# Patient Record
Sex: FEMALE | Race: WHITE | Employment: FULL TIME | URBAN - METROPOLITAN AREA
[De-identification: names, ages, dates, MRNs, and addresses within clinical notes are randomized per-mention and may not be internally consistent; named-entity substitution may affect disease eponyms.]

---

## 2017-01-10 DIAGNOSIS — G43.009 MIGRAINE WITHOUT AURA AND WITHOUT STATUS MIGRAINOSUS, NOT INTRACTABLE: ICD-10-CM

## 2017-01-10 RX ORDER — TOPIRAMATE 25 MG/1
TABLET ORAL
Qty: 60 TABLET | Refills: 2 | Status: SHIPPED | OUTPATIENT
Start: 2017-01-10 | End: 2017-03-06 | Stop reason: SDUPTHER

## 2017-01-22 ENCOUNTER — PATIENT MESSAGE (OUTPATIENT)
Dept: INTERNAL MEDICINE CLINIC | Age: 27
End: 2017-01-22

## 2017-01-22 DIAGNOSIS — F41.0 PANIC DISORDER: Primary | ICD-10-CM

## 2017-02-17 RX ORDER — ALPRAZOLAM 1 MG/1
TABLET ORAL
Qty: 20 TABLET | Refills: 0 | Status: SHIPPED | OUTPATIENT
Start: 2017-02-17 | End: 2017-03-14 | Stop reason: SDUPTHER

## 2017-02-25 RX ORDER — CETIRIZINE HYDROCHLORIDE 10 MG/1
10 TABLET ORAL DAILY
Qty: 90 TABLET | Refills: 3 | Status: SHIPPED | OUTPATIENT
Start: 2017-02-25

## 2017-02-28 RX ORDER — LEVONORGESTREL AND ETHINYL ESTRADIOL 0.15-0.03
KIT ORAL
Qty: 91 TABLET | Refills: 0 | Status: CANCELLED | OUTPATIENT
Start: 2017-02-28

## 2017-03-06 DIAGNOSIS — G43.009 MIGRAINE WITHOUT AURA AND WITHOUT STATUS MIGRAINOSUS, NOT INTRACTABLE: ICD-10-CM

## 2017-03-06 RX ORDER — TOPIRAMATE 25 MG/1
TABLET ORAL
Qty: 75 TABLET | Refills: 0
Start: 2017-03-06 | End: 2017-03-28 | Stop reason: SDUPTHER

## 2017-03-14 ENCOUNTER — OFFICE VISIT (OUTPATIENT)
Dept: INTERNAL MEDICINE CLINIC | Age: 27
End: 2017-03-14

## 2017-03-14 VITALS
BODY MASS INDEX: 30.95 KG/M2 | HEIGHT: 67 IN | WEIGHT: 197.2 LBS | SYSTOLIC BLOOD PRESSURE: 112 MMHG | HEART RATE: 72 BPM | DIASTOLIC BLOOD PRESSURE: 74 MMHG

## 2017-03-14 DIAGNOSIS — F41.0 PANIC DISORDER: ICD-10-CM

## 2017-03-14 DIAGNOSIS — Z30.41 ORAL CONTRACEPTIVE PILL SURVEILLANCE: ICD-10-CM

## 2017-03-14 DIAGNOSIS — G43.009 MIGRAINE WITHOUT AURA AND WITHOUT STATUS MIGRAINOSUS, NOT INTRACTABLE: ICD-10-CM

## 2017-03-14 DIAGNOSIS — F41.1 GAD (GENERALIZED ANXIETY DISORDER): Primary | ICD-10-CM

## 2017-03-14 DIAGNOSIS — M25.561 ACUTE PAIN OF BOTH KNEES: ICD-10-CM

## 2017-03-14 DIAGNOSIS — M25.562 ACUTE PAIN OF BOTH KNEES: ICD-10-CM

## 2017-03-14 PROCEDURE — 99214 OFFICE O/P EST MOD 30 MIN: CPT | Performed by: FAMILY MEDICINE

## 2017-03-14 RX ORDER — ALPRAZOLAM 1 MG/1
TABLET ORAL
Qty: 20 TABLET | Refills: 1 | Status: SHIPPED | OUTPATIENT
Start: 2017-03-14 | End: 2017-05-22 | Stop reason: SDUPTHER

## 2017-03-14 RX ORDER — LEVONORGESTREL AND ETHINYL ESTRADIOL 0.15-0.03
KIT ORAL
Qty: 2 PACKET | Refills: 10 | Status: SHIPPED | OUTPATIENT
Start: 2017-03-14 | End: 2017-03-20 | Stop reason: SDUPTHER

## 2017-03-20 DIAGNOSIS — Z30.41 ORAL CONTRACEPTIVE PILL SURVEILLANCE: ICD-10-CM

## 2017-03-20 DIAGNOSIS — G43.009 MIGRAINE WITHOUT AURA AND WITHOUT STATUS MIGRAINOSUS, NOT INTRACTABLE: ICD-10-CM

## 2017-03-20 RX ORDER — LEVONORGESTREL AND ETHINYL ESTRADIOL 0.15-0.03
KIT ORAL
Qty: 1 PACKET | Refills: 12 | Status: SHIPPED | OUTPATIENT
Start: 2017-03-20 | End: 2017-06-12 | Stop reason: ALTCHOICE

## 2017-03-20 RX ORDER — LEVONORGESTREL AND ETHINYL ESTRADIOL 0.15-0.03
KIT ORAL
Qty: 1 PACKET | Refills: 12 | Status: SHIPPED | OUTPATIENT
Start: 2017-03-20 | End: 2017-03-20 | Stop reason: SDUPTHER

## 2017-03-22 ENCOUNTER — TELEPHONE (OUTPATIENT)
Dept: INTERNAL MEDICINE CLINIC | Age: 27
End: 2017-03-22

## 2017-03-22 ENCOUNTER — PATIENT MESSAGE (OUTPATIENT)
Dept: INTERNAL MEDICINE CLINIC | Age: 27
End: 2017-03-22

## 2017-03-27 ENCOUNTER — TELEPHONE (OUTPATIENT)
Dept: INTERNAL MEDICINE CLINIC | Age: 27
End: 2017-03-27

## 2017-03-28 DIAGNOSIS — G43.009 MIGRAINE WITHOUT AURA AND WITHOUT STATUS MIGRAINOSUS, NOT INTRACTABLE: ICD-10-CM

## 2017-03-28 RX ORDER — ESCITALOPRAM OXALATE 10 MG/1
10 TABLET ORAL DAILY
Qty: 30 TABLET | Refills: 1 | Status: SHIPPED | OUTPATIENT
Start: 2017-03-28 | End: 2017-04-24 | Stop reason: SDUPTHER

## 2017-03-28 RX ORDER — TOPIRAMATE 25 MG/1
TABLET ORAL
Qty: 225 TABLET | Refills: 0 | Status: SHIPPED | OUTPATIENT
Start: 2017-03-28 | End: 2017-05-08 | Stop reason: SDUPTHER

## 2017-03-28 RX ORDER — TOPIRAMATE 25 MG/1
TABLET ORAL
Qty: 75 TABLET | Refills: 0 | Status: SHIPPED | OUTPATIENT
Start: 2017-03-28 | End: 2017-03-28 | Stop reason: SDUPTHER

## 2017-04-05 ENCOUNTER — TELEPHONE (OUTPATIENT)
Dept: PHARMACY | Facility: CLINIC | Age: 27
End: 2017-04-05

## 2017-04-12 ENCOUNTER — PATIENT MESSAGE (OUTPATIENT)
Dept: INTERNAL MEDICINE CLINIC | Age: 27
End: 2017-04-12

## 2017-04-12 DIAGNOSIS — G43.719 INTRACTABLE CHRONIC MIGRAINE WITHOUT AURA AND WITHOUT STATUS MIGRAINOSUS: ICD-10-CM

## 2017-04-12 DIAGNOSIS — G43.009 MIGRAINE WITHOUT AURA AND WITHOUT STATUS MIGRAINOSUS, NOT INTRACTABLE: Primary | ICD-10-CM

## 2017-04-12 RX ORDER — LEVONORGESTREL AND ETHINYL ESTRADIOL 0.15-0.03
KIT ORAL
Qty: 91 TABLET | Refills: 1 | Status: SHIPPED | OUTPATIENT
Start: 2017-04-12 | End: 2017-05-22 | Stop reason: SDUPTHER

## 2017-04-12 RX ORDER — LEVONORGESTREL AND ETHINYL ESTRADIOL 0.15-0.03
KIT ORAL
Qty: 91 TABLET | Refills: 1 | Status: SHIPPED | OUTPATIENT
Start: 2017-04-12 | End: 2017-04-12 | Stop reason: SDUPTHER

## 2017-04-24 RX ORDER — ESCITALOPRAM OXALATE 10 MG/1
10 TABLET ORAL DAILY
Qty: 90 TABLET | Refills: 1 | Status: SHIPPED | OUTPATIENT
Start: 2017-04-24 | End: 2018-01-05 | Stop reason: SDUPTHER

## 2017-04-30 DIAGNOSIS — G43.009 MIGRAINE WITHOUT AURA AND WITHOUT STATUS MIGRAINOSUS, NOT INTRACTABLE: ICD-10-CM

## 2017-05-01 RX ORDER — TOPIRAMATE 25 MG/1
TABLET ORAL
Qty: 75 TABLET | Refills: 0 | Status: SHIPPED | OUTPATIENT
Start: 2017-05-01 | End: 2017-05-08 | Stop reason: SDUPTHER

## 2017-05-04 ENCOUNTER — PATIENT MESSAGE (OUTPATIENT)
Dept: INTERNAL MEDICINE CLINIC | Age: 27
End: 2017-05-04

## 2017-05-04 DIAGNOSIS — G43.009 MIGRAINE WITHOUT AURA AND WITHOUT STATUS MIGRAINOSUS, NOT INTRACTABLE: ICD-10-CM

## 2017-05-08 RX ORDER — TOPIRAMATE 25 MG/1
TABLET ORAL
Qty: 90 TABLET | Refills: 0
Start: 2017-05-08 | End: 2017-06-12 | Stop reason: SDUPTHER

## 2017-05-22 DIAGNOSIS — F41.0 PANIC DISORDER: ICD-10-CM

## 2017-05-22 RX ORDER — LEVONORGESTREL AND ETHINYL ESTRADIOL 0.15-0.03
KIT ORAL
Qty: 91 TABLET | Refills: 1 | Status: SHIPPED | OUTPATIENT
Start: 2017-05-22 | End: 2017-12-19 | Stop reason: SDUPTHER

## 2017-05-22 RX ORDER — PROMETHAZINE HYDROCHLORIDE 25 MG/1
25 TABLET ORAL EVERY 6 HOURS PRN
Qty: 15 TABLET | Refills: 3 | Status: SHIPPED | OUTPATIENT
Start: 2017-05-22 | End: 2017-11-07 | Stop reason: SDUPTHER

## 2017-05-22 RX ORDER — ALPRAZOLAM 1 MG/1
TABLET ORAL
Qty: 20 TABLET | Refills: 0 | Status: SHIPPED | OUTPATIENT
Start: 2017-05-22 | End: 2017-06-12 | Stop reason: SDUPTHER

## 2017-06-12 ENCOUNTER — OFFICE VISIT (OUTPATIENT)
Dept: INTERNAL MEDICINE CLINIC | Age: 27
End: 2017-06-12

## 2017-06-12 VITALS
WEIGHT: 189 LBS | DIASTOLIC BLOOD PRESSURE: 68 MMHG | HEIGHT: 67 IN | HEART RATE: 64 BPM | SYSTOLIC BLOOD PRESSURE: 100 MMHG | BODY MASS INDEX: 29.66 KG/M2

## 2017-06-12 DIAGNOSIS — F41.1 GAD (GENERALIZED ANXIETY DISORDER): ICD-10-CM

## 2017-06-12 DIAGNOSIS — G43.009 MIGRAINE WITHOUT AURA AND WITHOUT STATUS MIGRAINOSUS, NOT INTRACTABLE: Primary | ICD-10-CM

## 2017-06-12 DIAGNOSIS — Z79.899 HIGH RISK MEDICATION USE: ICD-10-CM

## 2017-06-12 DIAGNOSIS — F41.0 PANIC DISORDER: ICD-10-CM

## 2017-06-12 DIAGNOSIS — Z11.4 SCREENING FOR HIV WITHOUT PRESENCE OF RISK FACTORS: ICD-10-CM

## 2017-06-12 PROCEDURE — 96372 THER/PROPH/DIAG INJ SC/IM: CPT | Performed by: FAMILY MEDICINE

## 2017-06-12 PROCEDURE — 99214 OFFICE O/P EST MOD 30 MIN: CPT | Performed by: FAMILY MEDICINE

## 2017-06-12 RX ORDER — ESTRADIOL 1 MG/1
TABLET ORAL
Qty: 30 TABLET | Refills: 3 | Status: SHIPPED | OUTPATIENT
Start: 2017-06-12 | End: 2017-09-08 | Stop reason: SDUPTHER

## 2017-06-12 RX ORDER — ALPRAZOLAM 1 MG/1
TABLET ORAL
Qty: 20 TABLET | Refills: 0 | Status: SHIPPED | OUTPATIENT
Start: 2017-06-12 | End: 2017-07-27 | Stop reason: SDUPTHER

## 2017-06-12 RX ORDER — TOPIRAMATE 50 MG/1
TABLET, FILM COATED ORAL
Qty: 180 TABLET | Refills: 3 | Status: SHIPPED | OUTPATIENT
Start: 2017-06-12 | End: 2018-07-05

## 2017-06-12 ASSESSMENT — ENCOUNTER SYMPTOMS
COUGH: 0
SHORTNESS OF BREATH: 0
CHEST TIGHTNESS: 0
WHEEZING: 0

## 2017-06-12 ASSESSMENT — PATIENT HEALTH QUESTIONNAIRE - PHQ9
SUM OF ALL RESPONSES TO PHQ QUESTIONS 1-9: 0
1. LITTLE INTEREST OR PLEASURE IN DOING THINGS: 0
SUM OF ALL RESPONSES TO PHQ9 QUESTIONS 1 & 2: 0
2. FEELING DOWN, DEPRESSED OR HOPELESS: 0

## 2017-06-26 PROBLEM — G43.839 INTRACTABLE MENSTRUAL MIGRAINE WITHOUT STATUS MIGRAINOSUS: Status: ACTIVE | Noted: 2017-06-26

## 2017-06-26 PROBLEM — G43.719 INTRACTABLE CHRONIC MIGRAINE WITHOUT AURA AND WITHOUT STATUS MIGRAINOSUS: Status: ACTIVE | Noted: 2017-06-26

## 2017-06-26 PROBLEM — M54.81 CERVICO-OCCIPITAL NEURALGIA: Status: ACTIVE | Noted: 2017-06-26

## 2017-07-17 ENCOUNTER — TELEPHONE (OUTPATIENT)
Dept: SLEEP MEDICINE | Age: 27
End: 2017-07-17

## 2017-07-27 ENCOUNTER — TELEPHONE (OUTPATIENT)
Dept: INTERNAL MEDICINE CLINIC | Age: 27
End: 2017-07-27

## 2017-07-27 DIAGNOSIS — F41.0 PANIC DISORDER: ICD-10-CM

## 2017-07-27 RX ORDER — ALPRAZOLAM 1 MG/1
TABLET ORAL
Qty: 20 TABLET | Refills: 1 | Status: SHIPPED | OUTPATIENT
Start: 2017-07-27 | End: 2017-09-08 | Stop reason: SDUPTHER

## 2017-09-08 ENCOUNTER — OFFICE VISIT (OUTPATIENT)
Dept: INTERNAL MEDICINE CLINIC | Age: 27
End: 2017-09-08

## 2017-09-08 VITALS
WEIGHT: 204 LBS | HEIGHT: 67 IN | HEART RATE: 72 BPM | SYSTOLIC BLOOD PRESSURE: 102 MMHG | BODY MASS INDEX: 32.02 KG/M2 | DIASTOLIC BLOOD PRESSURE: 84 MMHG

## 2017-09-08 DIAGNOSIS — Z13.31 POSITIVE DEPRESSION SCREENING: ICD-10-CM

## 2017-09-08 DIAGNOSIS — F32.9 REACTIVE DEPRESSION: Primary | ICD-10-CM

## 2017-09-08 DIAGNOSIS — Z79.899 ENCOUNTER FOR LONG TERM BENZODIAZEPINE THERAPY: ICD-10-CM

## 2017-09-08 DIAGNOSIS — F41.0 PANIC DISORDER: ICD-10-CM

## 2017-09-08 DIAGNOSIS — G43.009 MIGRAINE WITHOUT AURA AND WITHOUT STATUS MIGRAINOSUS, NOT INTRACTABLE: ICD-10-CM

## 2017-09-08 PROCEDURE — 99213 OFFICE O/P EST LOW 20 MIN: CPT | Performed by: FAMILY MEDICINE

## 2017-09-08 PROCEDURE — G8431 POS CLIN DEPRES SCRN F/U DOC: HCPCS | Performed by: FAMILY MEDICINE

## 2017-09-08 PROCEDURE — G0444 DEPRESSION SCREEN ANNUAL: HCPCS | Performed by: FAMILY MEDICINE

## 2017-09-08 RX ORDER — ESTRADIOL 1 MG/1
TABLET ORAL
Qty: 30 TABLET | Refills: 5 | Status: SHIPPED | OUTPATIENT
Start: 2017-09-08 | End: 2017-11-08 | Stop reason: SINTOL

## 2017-09-08 RX ORDER — BUPROPION HYDROCHLORIDE 150 MG/1
150 TABLET ORAL EVERY MORNING
Qty: 30 TABLET | Refills: 1 | Status: SHIPPED | OUTPATIENT
Start: 2017-09-08 | End: 2017-10-19 | Stop reason: SDUPTHER

## 2017-09-08 RX ORDER — ALPRAZOLAM 1 MG/1
TABLET ORAL
Qty: 20 TABLET | Refills: 2 | Status: SHIPPED | OUTPATIENT
Start: 2017-09-08 | End: 2018-01-15 | Stop reason: SDUPTHER

## 2017-09-08 ASSESSMENT — PATIENT HEALTH QUESTIONNAIRE - PHQ9
6. FEELING BAD ABOUT YOURSELF - OR THAT YOU ARE A FAILURE OR HAVE LET YOURSELF OR YOUR FAMILY DOWN: 1
9. THOUGHTS THAT YOU WOULD BE BETTER OFF DEAD, OR OF HURTING YOURSELF: 1
2. FEELING DOWN, DEPRESSED OR HOPELESS: 1
SUM OF ALL RESPONSES TO PHQ9 QUESTIONS 1 & 2: 3
1. LITTLE INTEREST OR PLEASURE IN DOING THINGS: 2
3. TROUBLE FALLING OR STAYING ASLEEP: 0
8. MOVING OR SPEAKING SO SLOWLY THAT OTHER PEOPLE COULD HAVE NOTICED. OR THE OPPOSITE, BEING SO FIGETY OR RESTLESS THAT YOU HAVE BEEN MOVING AROUND A LOT MORE THAN USUAL: 0
SUM OF ALL RESPONSES TO PHQ QUESTIONS 1-9: 12
10. IF YOU CHECKED OFF ANY PROBLEMS, HOW DIFFICULT HAVE THESE PROBLEMS MADE IT FOR YOU TO DO YOUR WORK, TAKE CARE OF THINGS AT HOME, OR GET ALONG WITH OTHER PEOPLE: 1
7. TROUBLE CONCENTRATING ON THINGS, SUCH AS READING THE NEWSPAPER OR WATCHING TELEVISION: 1
5. POOR APPETITE OR OVEREATING: 3
4. FEELING TIRED OR HAVING LITTLE ENERGY: 3

## 2017-10-06 ENCOUNTER — TELEPHONE (OUTPATIENT)
Dept: INTERNAL MEDICINE CLINIC | Age: 27
End: 2017-10-06

## 2017-10-06 NOTE — TELEPHONE ENCOUNTER
She is only to be on the estrace for 1 week out of 3 months. Hopefully that is what she is doing,     BUT yes, it can cause the nipple tenderness. She may wish to take 1/2 pill instead of one per day.

## 2017-10-19 ENCOUNTER — TELEPHONE (OUTPATIENT)
Dept: INTERNAL MEDICINE CLINIC | Age: 27
End: 2017-10-19

## 2017-10-19 ENCOUNTER — PATIENT MESSAGE (OUTPATIENT)
Dept: INTERNAL MEDICINE CLINIC | Age: 27
End: 2017-10-19

## 2017-10-19 DIAGNOSIS — F32.9 REACTIVE DEPRESSION: ICD-10-CM

## 2017-10-19 RX ORDER — BUPROPION HYDROCHLORIDE 300 MG/1
300 TABLET ORAL EVERY MORNING
Qty: 90 TABLET | Refills: 1 | Status: SHIPPED | OUTPATIENT
Start: 2017-10-19 | End: 2018-07-05 | Stop reason: SINTOL

## 2017-10-19 NOTE — LETTER
[facility]  [address] [place] [address]  Phone: [phone]  Fax: [phone]    Diya Ortiz MD        October 23, 2017     Patient: Yun Mendes   YOB: 1990   Date of Visit: 10/19/2017       To Whom It May Concern:    Yun Mendes takes the following medications that have a potential for sedation:    Xanax 1 mg for rare panic attack. Instructed not to drive at all within 6 hours of use. Hydroxyzine 25 mg at night AS needed. The sedation should be worn off by morning. Parafon Forte (Chlorzoxazone) 500 mg four times daily as needed for headache. None of these above medications are used on a daily basis. She also takes Topamax 50 mg twice a day. Less than 15 out of 100 people report any drowsiness with this medication and Yasmin Tang does not appear to have any sedation with it.         Sincerely,        Diya Ortiz MD

## 2017-11-07 DIAGNOSIS — G43.019 INTRACTABLE MIGRAINE WITHOUT AURA AND WITHOUT STATUS MIGRAINOSUS: ICD-10-CM

## 2017-11-07 RX ORDER — PROMETHAZINE HYDROCHLORIDE 25 MG/1
25 TABLET ORAL EVERY 6 HOURS PRN
Qty: 15 TABLET | Refills: 3 | Status: SHIPPED | OUTPATIENT
Start: 2017-11-07 | End: 2018-12-06 | Stop reason: SDUPTHER

## 2017-11-07 RX ORDER — NAPROXEN SODIUM 550 MG/1
TABLET ORAL
Qty: 90 TABLET | Refills: 2 | Status: SHIPPED | OUTPATIENT
Start: 2017-11-07 | End: 2018-02-05 | Stop reason: ALTCHOICE

## 2017-11-07 NOTE — TELEPHONE ENCOUNTER
From: Loistine Closs  To: Alyson Orourke MD  Sent: 11/6/2017 7:01 PM EST  Subject: Medication Renewal Request    Loistine Closs would like a refill of the following medications:  naproxen sodium (ANAPROX) 550 MG tablet Alyson Orourke MD]  promethazine (PHENERGAN) 25 MG tablet Alyson Orourke MD]    [address] pharmacy: [address] [facility] [phone] - F [phone]    Comment:      Medication renewals requested in this message routed separately:  SUMAtriptan (IMITREX) 6 MG/0.5ML SOLN injection [Sonia SMALLS Contractor, MD]

## 2017-11-08 ENCOUNTER — TELEPHONE (OUTPATIENT)
Dept: INTERNAL MEDICINE CLINIC | Age: 27
End: 2017-11-08

## 2017-11-08 DIAGNOSIS — N92.6 IRREGULAR MENSTRUAL BLEEDING: Primary | ICD-10-CM

## 2017-11-08 NOTE — TELEPHONE ENCOUNTER
Will order pelvic US    Get off the estradiol completely. Stop the birth control for 1 week and then restart only the birth control. Avoid sex while off the pill and the first 2 weeks of resuming. Schedule her a visit in the near future.

## 2017-11-13 ENCOUNTER — TELEPHONE (OUTPATIENT)
Dept: INTERNAL MEDICINE CLINIC | Age: 27
End: 2017-11-13

## 2017-12-01 ENCOUNTER — TELEPHONE (OUTPATIENT)
Dept: INTERNAL MEDICINE CLINIC | Age: 27
End: 2017-12-01

## 2017-12-19 RX ORDER — SUMATRIPTAN 100 MG/1
TABLET, FILM COATED ORAL
Qty: 27 TABLET | Refills: 3 | Status: SHIPPED | OUTPATIENT
Start: 2017-12-19 | End: 2018-07-05 | Stop reason: SDUPTHER

## 2017-12-19 RX ORDER — LEVONORGESTREL AND ETHINYL ESTRADIOL 0.15-0.03
KIT ORAL
Qty: 91 TABLET | Refills: 0 | Status: SHIPPED | OUTPATIENT
Start: 2017-12-19 | End: 2018-07-05 | Stop reason: SDUPTHER

## 2018-01-03 ENCOUNTER — OFFICE VISIT (OUTPATIENT)
Dept: ORTHOPEDIC SURGERY | Age: 28
End: 2018-01-03

## 2018-01-03 VITALS
DIASTOLIC BLOOD PRESSURE: 94 MMHG | WEIGHT: 220 LBS | BODY MASS INDEX: 33.34 KG/M2 | HEART RATE: 72 BPM | HEIGHT: 68 IN | SYSTOLIC BLOOD PRESSURE: 134 MMHG

## 2018-01-03 DIAGNOSIS — M25.521 ELBOW PAIN, RIGHT: Primary | ICD-10-CM

## 2018-01-03 DIAGNOSIS — S50.01XA CONTUSION OF RIGHT ELBOW, INITIAL ENCOUNTER: ICD-10-CM

## 2018-01-03 PROCEDURE — 73080 X-RAY EXAM OF ELBOW: CPT | Performed by: NURSE PRACTITIONER

## 2018-01-03 PROCEDURE — 99203 OFFICE O/P NEW LOW 30 MIN: CPT | Performed by: NURSE PRACTITIONER

## 2018-01-03 PROCEDURE — L3660 SO 8 AB RSTR CAN/WEB PRE OTS: HCPCS | Performed by: NURSE PRACTITIONER

## 2018-01-04 RX ORDER — MELOXICAM 15 MG/1
15 TABLET ORAL DAILY
Qty: 30 TABLET | Refills: 0 | Status: SHIPPED | OUTPATIENT
Start: 2018-01-04 | End: 2018-01-15

## 2018-01-04 NOTE — PROGRESS NOTES
Subjective    Patient ID: Neeta Thomas is a 24 y.o..  female. Patient's medications, allergies, past medical, surgical, social and family histories were reviewed and updated as appropriate. Pain Assessment  Location of Pain: Elbow  Location Modifiers: Right  Severity of Pain: 8  Quality of Pain: Aching  Duration of Pain: Persistent  Frequency of Pain: Constant  Date Pain First Started: 12/31/17  Aggravating Factors: Bending  Limiting Behavior: Some  Relieving Factors: Ice  Result of Injury: Yes  Work-Related Injury: No  Are there other pain locations you wish to document?: No    Elbow Pain:  Pt sustained a right elbow injury 3 day(s) ago. On 12/31/17 the patient hit her elbow hard on a counter at the ulnar aspect of her elbow. She points to the ulnar aspect of her elbow is the source of her pain and is having numbness and tingling into her forearm and 3 fingers. She is also having some shoulder pain but does not remember specific mechanism of injury related to the shoulder. She does have a history of neck pain. She has been taking naproxen with no relief. She has tried ice which provided some relief and heat which made it worse. 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 Physical Exam:  Constitutional:  Pt well groomed, no acute distress, well developed, no obvious deformities  Vitals:    01/03/18 1848   BP: (!) 134/94   Pulse: 72   Weight: 220 lb (99.8 kg)   Height: 5' 8\" (1.727 m)     -Oriented to person, place, and time  -Mood and affect is appropriate    Elbow exam:  normal exam of elbow, no swelling, tenderness, instability; full range of motion. Pain with elbow flexion and reaching.       Contralateral Exam:  -No obvious deformities  -No abrasions or cellulitis noted, NVI   -Full ROM   -No joint laxity  -no palpable tenderness noted    Skin:  No abrasions, lesions, cellulitic changes, obvious deformities noted     Xray: 3 view of  right elbow show:  No

## 2018-01-05 RX ORDER — ESCITALOPRAM OXALATE 10 MG/1
10 TABLET ORAL DAILY
Qty: 90 TABLET | Refills: 1 | Status: SHIPPED | OUTPATIENT
Start: 2018-01-05 | End: 2018-02-03 | Stop reason: SDUPTHER

## 2018-01-15 ENCOUNTER — OFFICE VISIT (OUTPATIENT)
Dept: INTERNAL MEDICINE CLINIC | Age: 28
End: 2018-01-15

## 2018-01-15 VITALS
HEIGHT: 68 IN | HEART RATE: 64 BPM | DIASTOLIC BLOOD PRESSURE: 82 MMHG | SYSTOLIC BLOOD PRESSURE: 130 MMHG | BODY MASS INDEX: 33.65 KG/M2 | WEIGHT: 222 LBS

## 2018-01-15 DIAGNOSIS — Z79.899 ENCOUNTER FOR LONG TERM BENZODIAZEPINE THERAPY: ICD-10-CM

## 2018-01-15 DIAGNOSIS — N92.6 IRREGULAR MENSTRUAL BLEEDING: ICD-10-CM

## 2018-01-15 DIAGNOSIS — F41.0 PANIC DISORDER: ICD-10-CM

## 2018-01-15 DIAGNOSIS — F41.1 GAD (GENERALIZED ANXIETY DISORDER): Primary | ICD-10-CM

## 2018-01-15 PROCEDURE — 99214 OFFICE O/P EST MOD 30 MIN: CPT | Performed by: FAMILY MEDICINE

## 2018-01-15 RX ORDER — ALPRAZOLAM 1 MG/1
TABLET ORAL
Qty: 20 TABLET | Refills: 2 | Status: SHIPPED | OUTPATIENT
Start: 2018-01-15 | End: 2018-04-15

## 2018-01-15 NOTE — PATIENT INSTRUCTIONS
Would strongly want you to consider DepoProvera or a low dose IUD for the birth control. Pills can be affected by your Topamax and reduce the hormone levels.

## 2018-01-15 NOTE — PROGRESS NOTES
Objective:   Physical Exam   Constitutional: She is oriented to person, place, and time. She appears well-developed and well-nourished. No distress. Eyes: Conjunctivae are normal. No scleral icterus. Neck: Normal range of motion. Neck supple. Carotid bruit is not present. No thyroid mass and no thyromegaly present. Cardiovascular: Normal rate, regular rhythm, S1 normal, S2 normal, normal heart sounds and intact distal pulses. No murmur heard. Pulmonary/Chest: Effort normal and breath sounds normal. No respiratory distress. She has no decreased breath sounds. She has no wheezes. She has no rhonchi. She has no rales. Abdominal: Soft. Normal appearance and bowel sounds are normal. She exhibits no abdominal bruit and no mass. There is no hepatomegaly. There is no tenderness. Lymphadenopathy:     She has no cervical adenopathy. Neurological: She is alert and oriented to person, place, and time. She displays no tremor. She exhibits normal muscle tone. Coordination and gait normal.   Skin: Skin is warm and dry. She is not diaphoretic. No cyanosis. No pallor. Nails show no clubbing. Psychiatric: She has a normal mood and affect. Her speech is normal and behavior is normal. Cognition and memory are normal.   Vitals reviewed. Wt Readings from Last 3 Encounters:   01/15/18 222 lb (100.7 kg)   01/03/18 220 lb (99.8 kg)   12/04/17 216 lb (98 kg)     Body mass index is 33.75 kg/m². Assessment:      1. Irregular menstrual bleeding  Stay off additional estrogen. Consider DepoProvera --- she heard bad things so does not want to do this. Consider low dose IUD. dwp that current bleeding is probably normal for extended spectrum ocp and her use of topamax with her obesity. Notify office if bleeding does not stop in 1 week, bernie heavy flow. - US Non OB Transvaginal  --- check cost at [facility].   - CBC Auto Differential  - Iron and TIBC  - TSH with Reflex    2.  VERA (generalized anxiety

## 2018-01-16 ASSESSMENT — ENCOUNTER SYMPTOMS
CHEST TIGHTNESS: 0
WHEEZING: 0
SHORTNESS OF BREATH: 0
COUGH: 0

## 2018-01-21 DIAGNOSIS — Z11.4 SCREENING FOR HIV WITHOUT PRESENCE OF RISK FACTORS: Primary | ICD-10-CM

## 2018-02-02 ENCOUNTER — TELEPHONE (OUTPATIENT)
Dept: NEUROLOGY | Age: 28
End: 2018-02-02

## 2018-02-02 DIAGNOSIS — M54.81 CERVICO-OCCIPITAL NEURALGIA: ICD-10-CM

## 2018-02-02 DIAGNOSIS — G43.839 INTRACTABLE MENSTRUAL MIGRAINE WITHOUT STATUS MIGRAINOSUS: ICD-10-CM

## 2018-02-02 DIAGNOSIS — G43.719 INTRACTABLE CHRONIC MIGRAINE WITHOUT AURA AND WITHOUT STATUS MIGRAINOSUS: ICD-10-CM

## 2018-02-02 NOTE — TELEPHONE ENCOUNTER
Office can check sample supply of zembrace & cambia. What are pt options since she can only have 2 zembrace. LMOV to see if insurance is not covering cambia?

## 2018-02-05 ENCOUNTER — TELEPHONE (OUTPATIENT)
Dept: INTERNAL MEDICINE CLINIC | Age: 28
End: 2018-02-05

## 2018-02-05 DIAGNOSIS — M54.81 CERVICO-OCCIPITAL NEURALGIA: ICD-10-CM

## 2018-02-05 DIAGNOSIS — G43.719 INTRACTABLE CHRONIC MIGRAINE WITHOUT AURA AND WITHOUT STATUS MIGRAINOSUS: ICD-10-CM

## 2018-02-05 DIAGNOSIS — G43.839 INTRACTABLE MENSTRUAL MIGRAINE WITHOUT STATUS MIGRAINOSUS: ICD-10-CM

## 2018-02-05 RX ORDER — ESCITALOPRAM OXALATE 10 MG/1
TABLET ORAL
Qty: 90 TABLET | Refills: 0 | Status: SHIPPED | OUTPATIENT
Start: 2018-02-05 | End: 2018-07-05 | Stop reason: SDUPTHER

## 2018-02-05 NOTE — TELEPHONE ENCOUNTER
A 90 day supply of Imitrex with 3 refills was sent to [facility] on 12/19/17. She should have refills left. Unfortunately, we cannot get them to refill early if you are not due yet. You can contact them. Cannot send in the Xanax for 90 days. Use the Goodrx coupons. I am not really familiar with this Cambia pack so how many for 90 days since it is used as needed. She sent in #8 so I assume this is to last 30 days but I really do not know since I did not prescribe it. Also if it is prior authorization, Dr. Dariel Jameson will need to do this.

## 2018-03-19 ENCOUNTER — PATIENT MESSAGE (OUTPATIENT)
Dept: INTERNAL MEDICINE CLINIC | Age: 28
End: 2018-03-19

## 2018-03-22 ENCOUNTER — PATIENT MESSAGE (OUTPATIENT)
Dept: INTERNAL MEDICINE CLINIC | Age: 28
End: 2018-03-22

## 2018-03-22 RX ORDER — BUSPIRONE HYDROCHLORIDE 5 MG/1
5 TABLET ORAL NIGHTLY
Qty: 30 TABLET | Refills: 1 | Status: SHIPPED | OUTPATIENT
Start: 2018-03-22 | End: 2018-07-05

## 2018-03-22 NOTE — TELEPHONE ENCOUNTER
From: Daniella Gonzalez  To: Alfredo Miramontes MD  Sent: 3/22/2018 10:40 AM EDT  Subject: Prescription Question    I will definitely start saving it for bad panic attacks only. I know sometimes I take it when I feel really anxious also, so Ill stop doing that! I shouldbe fine until may, but will let you know if I do end up needing a refill. With the buspar It made me WAY too tired. I already have trouble in the mornings as it is and I remember it was awful. If you need to lower the amount I get per 30 days that makes sense, but I would still like to keep the Xanax for panic attacks as needed so I dont have to take something every day. Also would like to talk about my weight soon. Thanks!!!  ----- Message -----  From: Alfredo Miramontes MD  Sent: 3/21/2018 4:21 PM EDT  To: Daniella Gonzalez  Subject: RE: Prescription Question  Please try to reserve the Xanax for a full blown panic attack. It should not be taken too often. I could get you a small supply if absolutely needed IF you run out. I think 20 pills per 30 days is starting to get a bit too frequent. We need to consider other anxiety treatments. Do you remember what side effects you had with Buspirone (Buspar)? Maybe we can use this again at a lower dose until you get used to it.      ----- Message -----   From: Daniella Gonzalez   Sent: 3/19/2018 5:12 PM EDT   To: Alfredo Miramontes MD  Subject: Prescription Question    Hi Dr. Shannon Young! I just got a job at the [address] [place] [address] been waiting on!! But... I wont have insurance until may 21stbi think it is. I have to do 90 days before Im enrolled. The problem is my alprazolam prescription runs out before then. Not sure what I should do if you have a recommendation Im not sure if you can fill it without my check-up. Ill also have to email dr. Andrew Small about my migrain medications! Please let me know, thank you!

## 2018-05-02 RX ORDER — ALPRAZOLAM 1 MG/1
TABLET ORAL
Qty: 20 TABLET | Refills: 0 | Status: SHIPPED | OUTPATIENT
Start: 2018-05-02 | End: 2018-07-05 | Stop reason: SDUPTHER

## 2018-05-15 ENCOUNTER — TELEPHONE (OUTPATIENT)
Dept: INTERNAL MEDICINE CLINIC | Age: 28
End: 2018-05-15

## 2018-07-05 ENCOUNTER — OFFICE VISIT (OUTPATIENT)
Dept: INTERNAL MEDICINE CLINIC | Age: 28
End: 2018-07-05

## 2018-07-05 VITALS
HEIGHT: 68 IN | BODY MASS INDEX: 33.52 KG/M2 | WEIGHT: 221.2 LBS | DIASTOLIC BLOOD PRESSURE: 78 MMHG | SYSTOLIC BLOOD PRESSURE: 122 MMHG | HEART RATE: 72 BPM

## 2018-07-05 DIAGNOSIS — F90.0 ATTENTION DEFICIT HYPERACTIVITY DISORDER (ADHD), PREDOMINANTLY INATTENTIVE TYPE: ICD-10-CM

## 2018-07-05 DIAGNOSIS — K21.9 GASTROESOPHAGEAL REFLUX DISEASE WITHOUT ESOPHAGITIS: ICD-10-CM

## 2018-07-05 DIAGNOSIS — F41.1 GAD (GENERALIZED ANXIETY DISORDER): Primary | ICD-10-CM

## 2018-07-05 DIAGNOSIS — J31.0 OTHER CHRONIC RHINITIS: ICD-10-CM

## 2018-07-05 DIAGNOSIS — Z79.899 ENCOUNTER FOR LONG TERM BENZODIAZEPINE THERAPY: ICD-10-CM

## 2018-07-05 DIAGNOSIS — F41.0 PANIC DISORDER: ICD-10-CM

## 2018-07-05 DIAGNOSIS — G43.009 MIGRAINE WITHOUT AURA AND WITHOUT STATUS MIGRAINOSUS, NOT INTRACTABLE: ICD-10-CM

## 2018-07-05 DIAGNOSIS — Z30.41 ORAL CONTRACEPTIVE PILL SURVEILLANCE: ICD-10-CM

## 2018-07-05 PROBLEM — G43.719 INTRACTABLE CHRONIC MIGRAINE WITHOUT AURA AND WITHOUT STATUS MIGRAINOSUS: Status: RESOLVED | Noted: 2017-06-26 | Resolved: 2018-07-05

## 2018-07-05 PROCEDURE — 99214 OFFICE O/P EST MOD 30 MIN: CPT | Performed by: FAMILY MEDICINE

## 2018-07-05 RX ORDER — FLUTICASONE PROPIONATE 50 MCG
SPRAY, SUSPENSION (ML) NASAL
Qty: 1 BOTTLE | Refills: 12 | Status: SHIPPED | OUTPATIENT
Start: 2018-07-05 | End: 2019-10-03 | Stop reason: SDUPTHER

## 2018-07-05 RX ORDER — LEVONORGESTREL AND ETHINYL ESTRADIOL 0.15-0.03
KIT ORAL
Qty: 91 TABLET | Refills: 3 | Status: SHIPPED | OUTPATIENT
Start: 2018-07-05 | End: 2019-08-12 | Stop reason: SDUPTHER

## 2018-07-05 RX ORDER — ALPRAZOLAM 1 MG/1
1 TABLET ORAL DAILY PRN
Qty: 20 TABLET | Refills: 1 | Status: SHIPPED | OUTPATIENT
Start: 2018-07-05 | End: 2018-09-03

## 2018-07-05 RX ORDER — SUMATRIPTAN 100 MG/1
TABLET, FILM COATED ORAL
Qty: 9 TABLET | Refills: 12 | Status: SHIPPED | OUTPATIENT
Start: 2018-07-05 | End: 2019-04-19 | Stop reason: SDUPTHER

## 2018-07-05 RX ORDER — SUMATRIPTAN AND NAPROXEN SODIUM 85; 500 MG/1; MG/1
TABLET, FILM COATED ORAL
Qty: 2 TABLET | Refills: 0 | Status: CANCELLED | OUTPATIENT
Start: 2018-07-05

## 2018-07-05 RX ORDER — NAPROXEN SODIUM 550 MG/1
TABLET ORAL
Qty: 60 TABLET | Refills: 2 | Status: SHIPPED | OUTPATIENT
Start: 2018-07-05

## 2018-07-05 RX ORDER — ESCITALOPRAM OXALATE 10 MG/1
TABLET ORAL
Qty: 90 TABLET | Refills: 1 | Status: SHIPPED | OUTPATIENT
Start: 2018-07-05 | End: 2018-08-07 | Stop reason: SDUPTHER

## 2018-07-05 NOTE — PROGRESS NOTES
Subjective:      Patient ID: Alexandrai Montemayor is a 24 y.o. female. 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 She loves her job. Hours are more regular so this is helping her migraines. The first 3 months of her new job, she was doing really good with her migraines. Some increase in migraines with the ho humidt weather, but after being on migraine support group site, she switched to natural deoderent and off the aluminum. She feels her headaches are doing better since she switched off of the alluminum antiperspirant. VERA:  Doing better. The new job is helping her plus she is meditating. She still gets a few bouts of panic. Overall better. ADD:  She notices once in a while that she has trouble focusing but told she is doing well at her work and likes it a lot. Does not feel she needs medication to do her job. Her current OCP is doing well also. Periods are regular.       Patient Active Problem List   Diagnosis    Migraine without aura and without status migrainosus, not intractable    ADHD (attention deficit hyperactivity disorder)    Allergic rhinitis    Kidney stone    Hypersomnia    VERA (generalized anxiety disorder)    Oral contraceptive pill surveillance    GERD (gastroesophageal reflux disease)    Intractable chronic migraine without aura and without status migrainosus    Intractable menstrual migraine without status migrainosus    Cervico-occipital neuralgia         Outpatient Prescriptions Marked as Taking for the 7/5/18 encounter (Office Visit) with Digna Magana MD   Medication Sig Dispense Refill    diclofenac (CATAFLAM) 50 MG tablet Take 1 tablet by mouth 3 times daily as needed (headache) Limit to 10 days a month 90 tablet 0    SUMAtriptan Succinate (ZEMBRACE SYMTOUCH) 3 MG/0.5ML SOAJ Inject 0.5 mLs into the skin as needed (headache) May repeat after 1 hr, not to exceed 2/24hrs. 3 pen 0    clindamycin-benzoyl peroxide (BENZACLIN) 1-5 % gel APPLY EXTERNALLY TO THE AFFECTED AREA TWICE DAILY 150 g 3    QUASENSE 0.15-0.03 MG per tablet TAKE 1 TABLET BY MOUTH DAILY 91 tablet 0    SUMAtriptan (IMITREX) 100 MG tablet TAKE ONE TABLET BY MOUTH ONCE AS NEEDED FOR MIGRAINE 27 tablet 3       Social History   Substance Use Topics    Smoking status: Former Smoker     Packs/day: 0.25     Years: 0.50     Types: Cigarettes     Start date: 11/1/2013     Quit date: 12/28/2013    Smokeless tobacco: Never Used      Comment: Denies smoking at this time    Alcohol use Yes      Comment: h/o weekend binges 5 or more drinks when out 1-2x/week;  11/14:  cut most to all drinking out             Review of Systems   Constitutional: Negative for unexpected weight change. Respiratory: Negative for cough, chest tightness, shortness of breath and wheezing. Cardiovascular: Negative for chest pain, palpitations and leg swelling. Gastrointestinal: Negative for abdominal pain and nausea. Skin: Negative for rash and wound. Neurological: Positive for headaches. Negative for dizziness, syncope, facial asymmetry, speech difficulty, weakness and numbness. Objective:   Physical Exam   Constitutional: She is oriented to person, place, and time. She appears well-developed and well-nourished. No distress. Eyes: Conjunctivae are normal. No scleral icterus. Neck: Normal range of motion. Neck supple. Carotid bruit is not present. No thyroid mass and no thyromegaly present. Cardiovascular: Normal rate, regular rhythm, S1 normal, S2 normal, normal heart sounds and intact distal pulses. No murmur heard. Pulmonary/Chest: Effort normal and breath sounds normal. No respiratory distress. She has no decreased breath sounds. She has no wheezes. She has no rhonchi. She has no rales. Abdominal: Soft.  Normal appearance and bowel sounds are normal. She exhibits no abdominal

## 2018-07-08 ASSESSMENT — ENCOUNTER SYMPTOMS
WHEEZING: 0
COUGH: 0
SHORTNESS OF BREATH: 0
CHEST TIGHTNESS: 0
ABDOMINAL PAIN: 0
NAUSEA: 0

## 2018-07-09 RX ORDER — LEVONORGESTREL AND ETHINYL ESTRADIOL 0.15-0.03
KIT ORAL
Qty: 91 TABLET | Refills: 0 | Status: SHIPPED | OUTPATIENT
Start: 2018-07-09 | End: 2019-01-23 | Stop reason: SDUPTHER

## 2018-08-06 ENCOUNTER — PATIENT MESSAGE (OUTPATIENT)
Dept: INTERNAL MEDICINE CLINIC | Age: 28
End: 2018-08-06

## 2018-08-06 DIAGNOSIS — F41.1 GAD (GENERALIZED ANXIETY DISORDER): ICD-10-CM

## 2018-08-07 RX ORDER — ESCITALOPRAM OXALATE 10 MG/1
TABLET ORAL
Qty: 90 TABLET | Refills: 1 | Status: SHIPPED | OUTPATIENT
Start: 2018-08-07 | End: 2018-11-12 | Stop reason: SDUPTHER

## 2018-08-07 NOTE — TELEPHONE ENCOUNTER
From: Jon Beatty  To: Chase Sylvester MD  Sent: 8/6/2018 11:07 PM EDT  Subject: Non-Urgent Medical Question    Hi Dr. Renny Alpers,  I think I want to start taking lexapro again. Cara vences feeling depressed again the last two weeks and angry/ irritated. What do you think?   Jon Beatty

## 2018-09-27 DIAGNOSIS — F41.0 PANIC DISORDER: Primary | ICD-10-CM

## 2018-09-28 RX ORDER — ALPRAZOLAM 1 MG/1
TABLET ORAL
Qty: 20 TABLET | Refills: 0 | Status: SHIPPED | OUTPATIENT
Start: 2018-09-28 | End: 2018-10-01 | Stop reason: ALTCHOICE

## 2018-10-01 ENCOUNTER — OFFICE VISIT (OUTPATIENT)
Dept: INTERNAL MEDICINE CLINIC | Age: 28
End: 2018-10-01
Payer: COMMERCIAL

## 2018-10-01 VITALS
HEART RATE: 84 BPM | SYSTOLIC BLOOD PRESSURE: 126 MMHG | DIASTOLIC BLOOD PRESSURE: 76 MMHG | BODY MASS INDEX: 33.8 KG/M2 | WEIGHT: 223 LBS | HEIGHT: 68 IN

## 2018-10-01 DIAGNOSIS — Z79.899 LONG-TERM USE OF HIGH-RISK MEDICATION: ICD-10-CM

## 2018-10-01 DIAGNOSIS — Z00.00 PREVENTATIVE HEALTH CARE: ICD-10-CM

## 2018-10-01 DIAGNOSIS — Z11.4 SCREENING FOR HIV WITHOUT PRESENCE OF RISK FACTORS: ICD-10-CM

## 2018-10-01 DIAGNOSIS — F41.0 PANIC DISORDER: Primary | ICD-10-CM

## 2018-10-01 LAB
BASOPHILS ABSOLUTE: 0 K/UL (ref 0–0.2)
BASOPHILS RELATIVE PERCENT: 0.5 %
EOSINOPHILS ABSOLUTE: 0.1 K/UL (ref 0–0.6)
EOSINOPHILS RELATIVE PERCENT: 1.1 %
HCT VFR BLD CALC: 40.1 % (ref 36–48)
HEMOGLOBIN: 13.6 G/DL (ref 12–16)
LYMPHOCYTES ABSOLUTE: 2.2 K/UL (ref 1–5.1)
LYMPHOCYTES RELATIVE PERCENT: 30.1 %
MCH RBC QN AUTO: 31.9 PG (ref 26–34)
MCHC RBC AUTO-ENTMCNC: 33.8 G/DL (ref 31–36)
MCV RBC AUTO: 94.4 FL (ref 80–100)
MONOCYTES ABSOLUTE: 0.5 K/UL (ref 0–1.3)
MONOCYTES RELATIVE PERCENT: 7 %
NEUTROPHILS ABSOLUTE: 4.4 K/UL (ref 1.7–7.7)
NEUTROPHILS RELATIVE PERCENT: 61.3 %
PDW BLD-RTO: 13 % (ref 12.4–15.4)
PLATELET # BLD: 233 K/UL (ref 135–450)
PMV BLD AUTO: 8.1 FL (ref 5–10.5)
RBC # BLD: 4.25 M/UL (ref 4–5.2)
WBC # BLD: 7.2 K/UL (ref 4–11)

## 2018-10-01 PROCEDURE — 99213 OFFICE O/P EST LOW 20 MIN: CPT | Performed by: FAMILY MEDICINE

## 2018-10-01 RX ORDER — BUTALBITAL, ACETAMINOPHEN AND CAFFEINE 50; 325; 40 MG/1; MG/1; MG/1
1 TABLET ORAL EVERY 4 HOURS PRN
COMMUNITY
End: 2019-07-03

## 2018-10-01 RX ORDER — LORAZEPAM 2 MG/1
2 TABLET ORAL EVERY 6 HOURS PRN
Qty: 20 TABLET | Refills: 0 | Status: SHIPPED | OUTPATIENT
Start: 2018-10-28 | End: 2018-11-26 | Stop reason: ALTCHOICE

## 2018-10-01 ASSESSMENT — PATIENT HEALTH QUESTIONNAIRE - PHQ9
SUM OF ALL RESPONSES TO PHQ QUESTIONS 1-9: 0
SUM OF ALL RESPONSES TO PHQ9 QUESTIONS 1 & 2: 0
1. LITTLE INTEREST OR PLEASURE IN DOING THINGS: 0
SUM OF ALL RESPONSES TO PHQ QUESTIONS 1-9: 0
2. FEELING DOWN, DEPRESSED OR HOPELESS: 0
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
## 2018-10-02 ENCOUNTER — TELEPHONE (OUTPATIENT)
Dept: INTERNAL MEDICINE CLINIC | Age: 28
End: 2018-10-02

## 2018-10-02 LAB
A/G RATIO: 1.7 (ref 1.1–2.2)
ALBUMIN SERPL-MCNC: 4.6 G/DL (ref 3.4–5)
ALP BLD-CCNC: 57 U/L (ref 40–129)
ALT SERPL-CCNC: 9 U/L (ref 10–40)
ANION GAP SERPL CALCULATED.3IONS-SCNC: 14 MMOL/L (ref 3–16)
AST SERPL-CCNC: 12 U/L (ref 15–37)
BILIRUB SERPL-MCNC: 0.4 MG/DL (ref 0–1)
BUN BLDV-MCNC: 11 MG/DL (ref 7–20)
CALCIUM SERPL-MCNC: 9.4 MG/DL (ref 8.3–10.6)
CHLORIDE BLD-SCNC: 103 MMOL/L (ref 99–110)
CO2: 23 MMOL/L (ref 21–32)
CREAT SERPL-MCNC: 0.7 MG/DL (ref 0.6–1.1)
GFR AFRICAN AMERICAN: >60
GFR NON-AFRICAN AMERICAN: >60
GLOBULIN: 2.7 G/DL
GLUCOSE BLD-MCNC: 79 MG/DL (ref 70–99)
HIV AG/AB: NORMAL
HIV ANTIGEN: NORMAL
HIV-1 ANTIBODY: NORMAL
HIV-2 AB: NORMAL
POTASSIUM SERPL-SCNC: 4.5 MMOL/L (ref 3.5–5.1)
SODIUM BLD-SCNC: 140 MMOL/L (ref 136–145)
T4 FREE: 1 NG/DL (ref 0.9–1.8)
TOTAL PROTEIN: 7.3 G/DL (ref 6.4–8.2)
TSH REFLEX: 9.72 UIU/ML (ref 0.27–4.2)

## 2018-11-05 ENCOUNTER — PATIENT MESSAGE (OUTPATIENT)
Dept: INTERNAL MEDICINE CLINIC | Age: 28
End: 2018-11-05

## 2018-11-05 DIAGNOSIS — G47.10 HYPERSOMNIA: ICD-10-CM

## 2018-11-05 DIAGNOSIS — G43.009 MIGRAINE WITHOUT AURA AND WITHOUT STATUS MIGRAINOSUS, NOT INTRACTABLE: Primary | ICD-10-CM

## 2018-11-07 ENCOUNTER — TELEPHONE (OUTPATIENT)
Dept: PULMONOLOGY | Age: 28
End: 2018-11-07

## 2018-11-12 DIAGNOSIS — F41.1 GAD (GENERALIZED ANXIETY DISORDER): ICD-10-CM

## 2018-11-12 RX ORDER — ESCITALOPRAM OXALATE 10 MG/1
15 TABLET ORAL DAILY
Qty: 90 TABLET | Refills: 0
Start: 2018-11-12 | End: 2019-01-23 | Stop reason: SDUPTHER

## 2018-11-21 ENCOUNTER — OFFICE VISIT (OUTPATIENT)
Dept: INTERNAL MEDICINE CLINIC | Age: 28
End: 2018-11-21
Payer: COMMERCIAL

## 2018-11-21 VITALS
WEIGHT: 218 LBS | SYSTOLIC BLOOD PRESSURE: 126 MMHG | HEIGHT: 68 IN | DIASTOLIC BLOOD PRESSURE: 76 MMHG | HEART RATE: 88 BPM | TEMPERATURE: 99.9 F | BODY MASS INDEX: 33.04 KG/M2

## 2018-11-21 DIAGNOSIS — J02.9 SORE THROAT: Primary | ICD-10-CM

## 2018-11-21 LAB — S PYO AG THROAT QL: NORMAL

## 2018-11-21 PROCEDURE — 87880 STREP A ASSAY W/OPTIC: CPT | Performed by: FAMILY MEDICINE

## 2018-11-21 PROCEDURE — 99213 OFFICE O/P EST LOW 20 MIN: CPT | Performed by: FAMILY MEDICINE

## 2018-11-21 RX ORDER — AMOXICILLIN 875 MG/1
875 TABLET, COATED ORAL 2 TIMES DAILY
Qty: 20 TABLET | Refills: 0 | Status: SHIPPED | OUTPATIENT
Start: 2018-11-21 | End: 2018-12-01

## 2018-11-21 ASSESSMENT — ENCOUNTER SYMPTOMS
NAUSEA: 1
CHEST TIGHTNESS: 0
SHORTNESS OF BREATH: 0
DIARRHEA: 0
WHEEZING: 0
COUGH: 1
SINUS PAIN: 0
VOICE CHANGE: 0
SINUS PRESSURE: 0
SORE THROAT: 1
TROUBLE SWALLOWING: 0
FACIAL SWELLING: 0

## 2018-11-21 NOTE — PROGRESS NOTES
Subjective:      Patient ID: Hero Baldwin is a 24 y.o. female. HPI     Chief Complaint   Patient presents with    Pharyngitis     started a few days ago. Was exposed to strep throat at work.  Fever       Sore throat, feels dizzy,  Feels hot and stuffy nose. First symptom on 11/18. No temp checks at home. Some cough. Not sure if drainage or chest cough. It has been a bit stuffy. No sinus pain or pressure. Got a bad headache this weekend. Co-worker has been at work with Strep throat. Has been feeling nauseated but could be from migraines. Review of Systems   Constitutional: Positive for diaphoresis. Negative for chills and fever. HENT: Positive for ear pain (right), sneezing and sore throat. Negative for ear discharge, facial swelling, sinus pain, sinus pressure, trouble swallowing and voice change. Respiratory: Positive for cough. Negative for chest tightness, shortness of breath and wheezing. Gastrointestinal: Positive for nausea. Negative for diarrhea. Musculoskeletal: Negative for neck stiffness. Objective:   Physical Exam   Constitutional: She appears well-developed and well-nourished. No distress. HENT:   Right Ear: Tympanic membrane, external ear and ear canal normal.   Left Ear: Tympanic membrane, external ear and ear canal normal.   Nose: No sinus tenderness. No epistaxis. Right sinus exhibits no maxillary sinus tenderness and no frontal sinus tenderness. Left sinus exhibits no maxillary sinus tenderness and no frontal sinus tenderness. Mouth/Throat: Uvula is midline and mucous membranes are normal. No trismus in the jaw. No uvula swelling. Posterior oropharyngeal erythema present. No oropharyngeal exudate or posterior oropharyngeal edema. Eyes: Conjunctivae are normal. Right eye exhibits no discharge. Left eye exhibits no discharge. Right conjunctiva is not injected. Left conjunctiva is not injected. Neck: Phonation normal. Neck supple.  No thyromegaly

## 2018-11-21 NOTE — PATIENT INSTRUCTIONS
AFRIN NO DRIP EXTRA MOISTURE NASAL SPRAY (OXYMETAZOLONE DECONGESTANT SPRAY)  can be used twice daily but only for a MAXIMUM OF 3 DAYS. Longer use can cause rebound congestion. 2-3 tsp of Children's Dimetapp Cold and Alllergy every 6 hours. TheraFlu is fine also    Ok to add Delsym or Mucinex DM if cough    Cold viruses will take 10-14 days to clear.     Not sure it it NOT strep so going to treat with

## 2018-11-26 ENCOUNTER — PATIENT MESSAGE (OUTPATIENT)
Dept: INTERNAL MEDICINE CLINIC | Age: 28
End: 2018-11-26

## 2018-11-26 DIAGNOSIS — F41.0 PANIC DISORDER: ICD-10-CM

## 2018-11-26 RX ORDER — PROPRANOLOL HYDROCHLORIDE 10 MG/1
10 TABLET ORAL 3 TIMES DAILY PRN
Qty: 30 TABLET | Refills: 1 | Status: SHIPPED | OUTPATIENT
Start: 2018-11-26 | End: 2019-04-03 | Stop reason: SDUPTHER

## 2018-11-26 RX ORDER — ALPRAZOLAM 0.5 MG/1
TABLET ORAL
Qty: 20 TABLET | Refills: 1 | Status: SHIPPED | OUTPATIENT
Start: 2018-11-26 | End: 2019-02-16 | Stop reason: SDUPTHER

## 2018-11-26 RX ORDER — ALPRAZOLAM 0.5 MG/1
TABLET ORAL
Qty: 20 TABLET | Refills: 1 | Status: SHIPPED | OUTPATIENT
Start: 2018-11-26 | End: 2018-11-26 | Stop reason: SDUPTHER

## 2018-12-06 ENCOUNTER — TELEPHONE (OUTPATIENT)
Dept: INTERNAL MEDICINE CLINIC | Age: 28
End: 2018-12-06

## 2018-12-06 RX ORDER — PROMETHAZINE HYDROCHLORIDE 25 MG/1
25 TABLET ORAL EVERY 6 HOURS PRN
Qty: 15 TABLET | Refills: 3 | Status: SHIPPED | OUTPATIENT
Start: 2018-12-06 | End: 2019-10-03

## 2018-12-20 ENCOUNTER — PATIENT MESSAGE (OUTPATIENT)
Dept: INTERNAL MEDICINE CLINIC | Age: 28
End: 2018-12-20

## 2018-12-20 DIAGNOSIS — K21.9 GASTROESOPHAGEAL REFLUX DISEASE WITHOUT ESOPHAGITIS: ICD-10-CM

## 2018-12-20 RX ORDER — METOCLOPRAMIDE 10 MG/1
TABLET ORAL
Qty: 90 TABLET | Refills: 1 | Status: SHIPPED | OUTPATIENT
Start: 2018-12-20 | End: 2019-08-28 | Stop reason: SDUPTHER

## 2019-01-02 ENCOUNTER — TELEPHONE (OUTPATIENT)
Dept: INTERNAL MEDICINE CLINIC | Age: 29
End: 2019-01-02

## 2019-01-03 ENCOUNTER — OFFICE VISIT (OUTPATIENT)
Dept: PULMONOLOGY | Age: 29
End: 2019-01-03
Payer: COMMERCIAL

## 2019-01-03 VITALS
OXYGEN SATURATION: 98 % | HEART RATE: 64 BPM | SYSTOLIC BLOOD PRESSURE: 131 MMHG | BODY MASS INDEX: 34.25 KG/M2 | WEIGHT: 226 LBS | HEIGHT: 68 IN | DIASTOLIC BLOOD PRESSURE: 82 MMHG

## 2019-01-03 DIAGNOSIS — G47.10 HYPERSOMNIA: Primary | ICD-10-CM

## 2019-01-03 DIAGNOSIS — J30.9 ALLERGIC RHINITIS, UNSPECIFIED SEASONALITY, UNSPECIFIED TRIGGER: Chronic | ICD-10-CM

## 2019-01-03 DIAGNOSIS — G43.009 MIGRAINE WITHOUT AURA AND WITHOUT STATUS MIGRAINOSUS, NOT INTRACTABLE: Chronic | ICD-10-CM

## 2019-01-03 DIAGNOSIS — R06.83 SNORING: ICD-10-CM

## 2019-01-03 DIAGNOSIS — K21.9 GASTROESOPHAGEAL REFLUX DISEASE WITHOUT ESOPHAGITIS: Chronic | ICD-10-CM

## 2019-01-03 DIAGNOSIS — E66.9 NON MORBID OBESITY, UNSPECIFIED OBESITY TYPE: Chronic | ICD-10-CM

## 2019-01-03 DIAGNOSIS — F41.1 GAD (GENERALIZED ANXIETY DISORDER): Chronic | ICD-10-CM

## 2019-01-03 PROCEDURE — 99244 OFF/OP CNSLTJ NEW/EST MOD 40: CPT | Performed by: INTERNAL MEDICINE

## 2019-01-03 ASSESSMENT — SLEEP AND FATIGUE QUESTIONNAIRES
HOW LIKELY ARE YOU TO NOD OFF OR FALL ASLEEP IN A CAR, WHILE STOPPED FOR A FEW MINUTES IN TRAFFIC: 2
NECK CIRCUMFERENCE (INCHES): 15.5
HOW LIKELY ARE YOU TO NOD OFF OR FALL ASLEEP WHEN YOU ARE A PASSENGER IN A CAR FOR AN HOUR WITHOUT A BREAK: 3
HOW LIKELY ARE YOU TO NOD OFF OR FALL ASLEEP WHILE SITTING QUIETLY AFTER LUNCH WITHOUT ALCOHOL: 3
HOW LIKELY ARE YOU TO NOD OFF OR FALL ASLEEP WHILE SITTING AND READING: 2
HOW LIKELY ARE YOU TO NOD OFF OR FALL ASLEEP WHILE SITTING AND TALKING TO SOMEONE: 1
HOW LIKELY ARE YOU TO NOD OFF OR FALL ASLEEP WHILE LYING DOWN TO REST IN THE AFTERNOON WHEN CIRCUMSTANCES PERMIT: 3
HOW LIKELY ARE YOU TO NOD OFF OR FALL ASLEEP WHILE WATCHING TV: 3
ESS TOTAL SCORE: 20
HOW LIKELY ARE YOU TO NOD OFF OR FALL ASLEEP WHILE SITTING INACTIVE IN A PUBLIC PLACE: 3

## 2019-01-03 ASSESSMENT — ENCOUNTER SYMPTOMS
ALLERGIC/IMMUNOLOGIC NEGATIVE: 1
CHOKING: 0
VOMITING: 0
APNEA: 0
ABDOMINAL DISTENTION: 0
SHORTNESS OF BREATH: 0
RHINORRHEA: 0
NAUSEA: 0
ABDOMINAL PAIN: 0
EYE PAIN: 0
PHOTOPHOBIA: 0
CHEST TIGHTNESS: 0

## 2019-01-09 LAB
CHOLESTEROL, TOTAL: 246 MG/DL
CHOLESTEROL/HDL RATIO: ABNORMAL
HDLC SERPL-MCNC: 43 MG/DL (ref 35–70)
LDL CHOLESTEROL CALCULATED: 162 MG/DL (ref 0–160)
TRIGL SERPL-MCNC: 205 MG/DL
VLDLC SERPL CALC-MCNC: ABNORMAL MG/DL

## 2019-01-15 ENCOUNTER — HOSPITAL ENCOUNTER (OUTPATIENT)
Dept: SLEEP CENTER | Age: 29
Discharge: HOME | End: 2019-01-15
Payer: COMMERCIAL

## 2019-01-15 PROCEDURE — 95806 SLEEP STUDY UNATT&RESP EFFT: CPT

## 2019-01-22 PROCEDURE — 95806 SLEEP STUDY UNATT&RESP EFFT: CPT | Performed by: INTERNAL MEDICINE

## 2019-01-23 ENCOUNTER — TELEPHONE (OUTPATIENT)
Dept: PULMONOLOGY | Age: 29
End: 2019-01-23

## 2019-01-23 ENCOUNTER — OFFICE VISIT (OUTPATIENT)
Dept: INTERNAL MEDICINE CLINIC | Age: 29
End: 2019-01-23
Payer: COMMERCIAL

## 2019-01-23 VITALS
BODY MASS INDEX: 35.31 KG/M2 | DIASTOLIC BLOOD PRESSURE: 88 MMHG | SYSTOLIC BLOOD PRESSURE: 132 MMHG | HEIGHT: 68 IN | WEIGHT: 233 LBS | HEART RATE: 84 BPM

## 2019-01-23 DIAGNOSIS — Z00.00 ANNUAL PHYSICAL EXAM: Primary | ICD-10-CM

## 2019-01-23 DIAGNOSIS — F41.1 GAD (GENERALIZED ANXIETY DISORDER): ICD-10-CM

## 2019-01-23 DIAGNOSIS — Z79.899 CHRONIC USE OF BENZODIAZEPINE FOR THERAPEUTIC PURPOSE: ICD-10-CM

## 2019-01-23 PROCEDURE — 99395 PREV VISIT EST AGE 18-39: CPT | Performed by: FAMILY MEDICINE

## 2019-01-23 RX ORDER — ESCITALOPRAM OXALATE 20 MG/1
20 TABLET ORAL DAILY
Qty: 30 TABLET | Refills: 2 | Status: SHIPPED | OUTPATIENT
Start: 2019-01-23 | End: 2019-11-06 | Stop reason: SDUPTHER

## 2019-01-24 ENCOUNTER — TELEPHONE (OUTPATIENT)
Dept: INTERNAL MEDICINE CLINIC | Age: 29
End: 2019-01-24

## 2019-01-27 PROBLEM — Z79.899 CHRONIC USE OF BENZODIAZEPINE FOR THERAPEUTIC PURPOSE: Status: ACTIVE | Noted: 2019-01-27

## 2019-02-16 DIAGNOSIS — F41.0 PANIC DISORDER: ICD-10-CM

## 2019-02-18 RX ORDER — ALPRAZOLAM 0.5 MG/1
TABLET ORAL
Qty: 20 TABLET | Refills: 0 | Status: SHIPPED | OUTPATIENT
Start: 2019-02-18 | End: 2019-03-19 | Stop reason: SDUPTHER

## 2019-02-20 ENCOUNTER — OFFICE VISIT (OUTPATIENT)
Dept: INTERNAL MEDICINE CLINIC | Age: 29
End: 2019-02-20
Payer: COMMERCIAL

## 2019-02-20 VITALS
HEART RATE: 68 BPM | HEIGHT: 68 IN | TEMPERATURE: 97.9 F | BODY MASS INDEX: 35.92 KG/M2 | DIASTOLIC BLOOD PRESSURE: 70 MMHG | WEIGHT: 237 LBS | SYSTOLIC BLOOD PRESSURE: 110 MMHG

## 2019-02-20 DIAGNOSIS — J06.9 VIRAL UPPER RESPIRATORY ILLNESS: ICD-10-CM

## 2019-02-20 DIAGNOSIS — J02.9 ACUTE PHARYNGITIS, UNSPECIFIED ETIOLOGY: Primary | ICD-10-CM

## 2019-02-20 LAB — S PYO AG THROAT QL: NORMAL

## 2019-02-20 PROCEDURE — 99212 OFFICE O/P EST SF 10 MIN: CPT | Performed by: FAMILY MEDICINE

## 2019-02-20 PROCEDURE — 87880 STREP A ASSAY W/OPTIC: CPT | Performed by: FAMILY MEDICINE

## 2019-02-20 ASSESSMENT — ENCOUNTER SYMPTOMS
RHINORRHEA: 1
SORE THROAT: 1
DIARRHEA: 1

## 2019-03-04 ENCOUNTER — TELEPHONE (OUTPATIENT)
Dept: INTERNAL MEDICINE CLINIC | Age: 29
End: 2019-03-04

## 2019-03-19 DIAGNOSIS — F41.0 PANIC DISORDER: ICD-10-CM

## 2019-03-20 RX ORDER — ALPRAZOLAM 0.5 MG/1
TABLET ORAL
Qty: 20 TABLET | Refills: 0 | Status: SHIPPED | OUTPATIENT
Start: 2019-03-20 | End: 2019-04-19 | Stop reason: SDUPTHER

## 2019-04-03 DIAGNOSIS — F41.0 PANIC DISORDER: ICD-10-CM

## 2019-04-03 RX ORDER — PROPRANOLOL HYDROCHLORIDE 10 MG/1
TABLET ORAL
Qty: 30 TABLET | Refills: 0 | Status: SHIPPED | OUTPATIENT
Start: 2019-04-03 | End: 2020-03-18 | Stop reason: SDUPTHER

## 2019-04-19 DIAGNOSIS — F41.0 PANIC DISORDER: ICD-10-CM

## 2019-04-22 RX ORDER — ALPRAZOLAM 0.5 MG/1
TABLET ORAL
Qty: 20 TABLET | Refills: 0 | Status: SHIPPED | OUTPATIENT
Start: 2019-04-22 | End: 2019-05-21 | Stop reason: SDUPTHER

## 2019-05-14 ENCOUNTER — TELEPHONE (OUTPATIENT)
Dept: INTERNAL MEDICINE CLINIC | Age: 29
End: 2019-05-14

## 2019-05-14 NOTE — TELEPHONE ENCOUNTER
Pt called states due to not having any insurance she is unable to be seen nor have you order her medication bu she was questioning what medication she could buy OTC for a stuffy nose, coughing and just feeling Joy Lively  Started couple days ago.

## 2019-05-14 NOTE — TELEPHONE ENCOUNTER
We have pretty generous discounts for self pay office visits. So it is not true that she cannot be seen without insurance. For otc cold medication. 2-3 tsp of Children's Dimetapp Cold and Alllergy every 6 hours. And Tylenol or Advil.

## 2019-06-14 ENCOUNTER — TELEPHONE (OUTPATIENT)
Dept: INTERNAL MEDICINE CLINIC | Age: 29
End: 2019-06-14

## 2019-06-14 NOTE — TELEPHONE ENCOUNTER
Pt has a employment physical form that needs to be completed. Pt does not have insurance and is wondering if this can be done based on her routine physical from 01/23/19. She also wants to know if she is able to get refills on meds until she has insurance again. She did not specify which meds, she just wants to know for future refill requests.

## 2019-06-14 NOTE — TELEPHONE ENCOUNTER
Spoke with patient. She understood to make an appointment. Appointment made for 6/21/19. She wants to bring the form for her job to office so Roel Figueroa can review it.

## 2019-06-14 NOTE — TELEPHONE ENCOUNTER
I have no idea what is on the employment form is asking so the Jan visit may or may not be filled out from January. We have generous self pay fee schedule to come for visits.   I will need to see you if you continue to use Lorazepam.    If you are working, you need to budget health care into your life like you do rent, utilities, etc.

## 2019-07-03 ENCOUNTER — OFFICE VISIT (OUTPATIENT)
Dept: INTERNAL MEDICINE CLINIC | Age: 29
End: 2019-07-03

## 2019-07-03 VITALS
WEIGHT: 223 LBS | HEIGHT: 68 IN | SYSTOLIC BLOOD PRESSURE: 118 MMHG | HEART RATE: 88 BPM | BODY MASS INDEX: 33.8 KG/M2 | DIASTOLIC BLOOD PRESSURE: 76 MMHG

## 2019-07-03 DIAGNOSIS — G43.009 MIGRAINE WITHOUT AURA AND WITHOUT STATUS MIGRAINOSUS, NOT INTRACTABLE: Primary | ICD-10-CM

## 2019-07-03 DIAGNOSIS — Z79.899 CHRONIC USE OF BENZODIAZEPINE FOR THERAPEUTIC PURPOSE: ICD-10-CM

## 2019-07-03 DIAGNOSIS — F41.0 PANIC DISORDER: ICD-10-CM

## 2019-07-03 PROCEDURE — 99213 OFFICE O/P EST LOW 20 MIN: CPT | Performed by: FAMILY MEDICINE

## 2019-07-03 RX ORDER — AMITRIPTYLINE HYDROCHLORIDE 10 MG/1
10 TABLET, FILM COATED ORAL NIGHTLY
Qty: 30 TABLET | Refills: 5 | Status: SHIPPED | OUTPATIENT
Start: 2019-07-03 | End: 2019-11-22 | Stop reason: SINTOL

## 2019-07-03 RX ORDER — BUTALBITAL, ACETAMINOPHEN AND CAFFEINE 50; 325; 40 MG/1; MG/1; MG/1
1 TABLET ORAL EVERY 4 HOURS PRN
Qty: 30 TABLET | Refills: 1 | Status: SHIPPED | OUTPATIENT
Start: 2019-07-03 | End: 2019-11-20 | Stop reason: SDUPTHER

## 2019-07-03 RX ORDER — ALPRAZOLAM 0.5 MG/1
TABLET ORAL
Qty: 20 TABLET | Refills: 2 | Status: SHIPPED | OUTPATIENT
Start: 2019-07-03 | End: 2019-10-03 | Stop reason: SDUPTHER

## 2019-07-03 ASSESSMENT — PATIENT HEALTH QUESTIONNAIRE - PHQ9
1. LITTLE INTEREST OR PLEASURE IN DOING THINGS: 0
2. FEELING DOWN, DEPRESSED OR HOPELESS: 0
SUM OF ALL RESPONSES TO PHQ9 QUESTIONS 1 & 2: 0
SUM OF ALL RESPONSES TO PHQ QUESTIONS 1-9: 0
SUM OF ALL RESPONSES TO PHQ QUESTIONS 1-9: 0

## 2019-07-06 ENCOUNTER — PATIENT MESSAGE (OUTPATIENT)
Dept: INTERNAL MEDICINE CLINIC | Age: 29
End: 2019-07-06

## 2019-07-06 DIAGNOSIS — G43.719 INTRACTABLE CHRONIC MIGRAINE WITHOUT AURA AND WITHOUT STATUS MIGRAINOSUS: ICD-10-CM

## 2019-07-09 RX ORDER — ONDANSETRON 4 MG/1
4 TABLET, FILM COATED ORAL EVERY 8 HOURS PRN
Qty: 20 TABLET | Refills: 2 | Status: SHIPPED | OUTPATIENT
Start: 2019-07-09 | End: 2020-02-14 | Stop reason: SDUPTHER

## 2019-07-09 RX ORDER — HYDROXYZINE PAMOATE 25 MG/1
CAPSULE ORAL
Qty: 30 CAPSULE | Refills: 3 | Status: SHIPPED | OUTPATIENT
Start: 2019-07-09 | End: 2020-05-14 | Stop reason: SDUPTHER

## 2019-07-09 NOTE — TELEPHONE ENCOUNTER
From: Enma Medley  To: Lucina Borges MD  Sent: 7/6/2019 12:04 PM EDT  Subject: Prescription Question    Hi,    There are two other medications I was receiving from dr agudelo I was wondering if you would fill. Obviously only if you are comfortable. The one is odanestron for nausea, not sure of spelling. Im completely out of that one. The other one I still have a few left of is hydroxyzine pamoate. They are both on my medication list but it wouldnt let me request a refill that way. Let me know what you think.    Thank you!!  Joanne Morel

## 2019-07-20 DIAGNOSIS — G43.009 MIGRAINE WITHOUT AURA AND WITHOUT STATUS MIGRAINOSUS, NOT INTRACTABLE: ICD-10-CM

## 2019-07-22 RX ORDER — SUMATRIPTAN 100 MG/1
TABLET, FILM COATED ORAL
Qty: 9 TABLET | Refills: 12 | Status: SHIPPED | OUTPATIENT
Start: 2019-07-22 | End: 2019-10-03 | Stop reason: SDUPTHER

## 2019-07-30 ENCOUNTER — TELEPHONE (OUTPATIENT)
Dept: INTERNAL MEDICINE CLINIC | Age: 29
End: 2019-07-30

## 2019-08-12 DIAGNOSIS — Z30.41 ORAL CONTRACEPTIVE PILL SURVEILLANCE: ICD-10-CM

## 2019-08-12 RX ORDER — LEVONORGESTREL AND ETHINYL ESTRADIOL 0.15-0.03
KIT ORAL
Qty: 91 TABLET | Refills: 0 | Status: SHIPPED | OUTPATIENT
Start: 2019-08-12 | End: 2019-10-03 | Stop reason: SDUPTHER

## 2019-08-19 ENCOUNTER — OFFICE VISIT (OUTPATIENT)
Dept: INTERNAL MEDICINE CLINIC | Age: 29
End: 2019-08-19

## 2019-08-19 VITALS
WEIGHT: 224 LBS | HEIGHT: 68 IN | HEART RATE: 88 BPM | SYSTOLIC BLOOD PRESSURE: 110 MMHG | BODY MASS INDEX: 33.95 KG/M2 | DIASTOLIC BLOOD PRESSURE: 76 MMHG

## 2019-08-19 DIAGNOSIS — R11.14 BILIOUS VOMITING WITH NAUSEA: ICD-10-CM

## 2019-08-19 DIAGNOSIS — R10.11 RUQ PAIN: Primary | ICD-10-CM

## 2019-08-19 LAB
ALBUMIN SERPL-MCNC: 4.6 G/DL (ref 3.4–5)
ALP BLD-CCNC: 67 U/L (ref 40–129)
ALT SERPL-CCNC: 9 U/L (ref 10–40)
AST SERPL-CCNC: 11 U/L (ref 15–37)
BASOPHILS ABSOLUTE: 0 K/UL (ref 0–0.2)
BASOPHILS RELATIVE PERCENT: 0.5 %
BILIRUB SERPL-MCNC: 0.4 MG/DL (ref 0–1)
BILIRUBIN DIRECT: <0.2 MG/DL (ref 0–0.3)
BILIRUBIN, INDIRECT: ABNORMAL MG/DL (ref 0–1)
EOSINOPHILS ABSOLUTE: 0.2 K/UL (ref 0–0.6)
EOSINOPHILS RELATIVE PERCENT: 3.8 %
HCT VFR BLD CALC: 38.1 % (ref 36–48)
HEMOGLOBIN: 13 G/DL (ref 12–16)
LYMPHOCYTES ABSOLUTE: 1.4 K/UL (ref 1–5.1)
LYMPHOCYTES RELATIVE PERCENT: 27.2 %
MCH RBC QN AUTO: 31.8 PG (ref 26–34)
MCHC RBC AUTO-ENTMCNC: 34 G/DL (ref 31–36)
MCV RBC AUTO: 93.3 FL (ref 80–100)
MONOCYTES ABSOLUTE: 0.4 K/UL (ref 0–1.3)
MONOCYTES RELATIVE PERCENT: 8.2 %
NEUTROPHILS ABSOLUTE: 3.1 K/UL (ref 1.7–7.7)
NEUTROPHILS RELATIVE PERCENT: 60.3 %
PDW BLD-RTO: 13.8 % (ref 12.4–15.4)
PLATELET # BLD: 262 K/UL (ref 135–450)
PMV BLD AUTO: 7.8 FL (ref 5–10.5)
RBC # BLD: 4.09 M/UL (ref 4–5.2)
TOTAL PROTEIN: 7.2 G/DL (ref 6.4–8.2)
WBC # BLD: 5.1 K/UL (ref 4–11)

## 2019-08-19 PROCEDURE — 99213 OFFICE O/P EST LOW 20 MIN: CPT | Performed by: FAMILY MEDICINE

## 2019-08-19 NOTE — PROGRESS NOTES
Subjective:      Patient ID: Yosef Miller is a 34 y.o. female. HPI   Chief Complaint   Patient presents with    Gastroesophageal Reflux     Flare up on Friday and Saturday, with vomiting. Having stomach issues. email sent to me on Saturday:  Im trying to get in on Wednesday for this. I think I have an ulcer or something going on with my stomach/esophagus. The acid reflux keeps coming back but the last two days have been really painful. Its a sharp pain in my upper abdomen it hurts when I eat and if I lay down it gets worse. I threw up twice this morning and the second time had a minerva of blood. Im going on a cruise leaving Thursday so I want to get this taken care of before I go. If you have any pain relief suggestions or can prescribe anything before my appt I would appreciate it. Thank you    She woke up with hunger pains one morning. It felt like she was being stabbed in epigastrium. Thinks this was AM of 8/17. She did throw up one time with spit and bile and then second time she threw up flecks of blood. She threw up for a couple of hours. She had burning in the epigastrium and some cramping abdominal pain. Took Peptobismol. Kemp better. Went out to dinner that PM and ate a normal meal around 5 pm.   Then the clenching came back at 11 PM and had diarrhea. Took metoclopramide and drank water and the threw up undigested food. Friday PM went to [employer]:  [employer]. Did not eat the onions or the tomatoes. Just water to drink. No alcohol Fri or Sat. Sat was at [employer]:  Cheeseburger and fries but did not eat a lot of eat. She had cottage and tomatoes. It seemed to agree with her. Also ate a banana and did OK    Feels pretty good right now. No food today.       Outpatient Medications Marked as Taking for the 8/19/19 encounter (Office Visit) with Lindsay Koenig MD   Medication Sig Dispense Refill    levonorgestrel-ethinyl estradiol (Sloan Sorto) 0.15-0.03 MG per

## 2019-08-19 NOTE — PATIENT INSTRUCTIONS
Take some Peptobismol tablets and if you can find Milk of Magnesia capsules with you on the trip. Metoclopramide can be used. Zofran is fine. Dramamine can help nausea also. Patient Education        Low-Fat Diet for Gallbladder Disease: Care Instructions  Your Care Instructions  When you eat, the gallbladder releases bile, which helps you digest the fat in food. If you have an inflamed gallbladder, this may cause pain. A low-fat diet may give your gallbladder a rest so you can start to heal. Your doctor and dietitian can help you make an eating plan that does not irritate your digestive system. Always talk with your doctor or dietitian before you make changes in your diet. Follow-up care is a key part of your treatment and safety. Be sure to make and go to all appointments, and call your doctor if you are having problems. It's also a good idea to know your test results and keep a list of the medicines you take. How can you care for yourself at home? · Eat many small meals and snacks each day instead of three large meals. · Choose lean meats. ? Eat no more than 5 to 6½ ounces of meat a day. ? Cut off all fat you can see. ? Eat chicken and turkey without the skin. ? Many types of fish, such as salmon, lake trout, tuna, and herring, provide healthy omega-3 fat. But, avoid fish canned in oil, such as sardines in olive oil. ? Bake, broil, or grill meats, poultry, or fish instead of frying them in butter or fat. · Drink or eat nonfat or low-fat milk, yogurt, cheese, or other milk products each day. ? Read the labels on cheeses, and choose those with less than 5 grams of fat an ounce. ? Try fat-free sour cream, cream cheese, or yogurt. ? Avoid cream soups and cream sauces on pasta. ? Eat low-fat ice cream, frozen yogurt, or sorbet. Avoid regular ice cream.  · Eat whole-grain cereals, breads, crackers, rice, or pasta.  Avoid high-fat foods such as croissants, scones, biscuits, waffles, doughnuts,

## 2019-08-28 ENCOUNTER — PATIENT MESSAGE (OUTPATIENT)
Dept: INTERNAL MEDICINE CLINIC | Age: 29
End: 2019-08-28

## 2019-08-28 DIAGNOSIS — K21.9 GASTROESOPHAGEAL REFLUX DISEASE WITHOUT ESOPHAGITIS: ICD-10-CM

## 2019-08-28 RX ORDER — METOCLOPRAMIDE 10 MG/1
TABLET ORAL
Qty: 90 TABLET | Refills: 0 | Status: SHIPPED | OUTPATIENT
Start: 2019-08-28 | End: 2020-03-18 | Stop reason: SDUPTHER

## 2019-08-28 RX ORDER — OMEPRAZOLE 20 MG/1
20 CAPSULE, DELAYED RELEASE ORAL DAILY PRN
Qty: 30 CAPSULE | Refills: 3 | Status: SHIPPED | OUTPATIENT
Start: 2019-08-28

## 2019-09-03 ENCOUNTER — PATIENT MESSAGE (OUTPATIENT)
Dept: INTERNAL MEDICINE CLINIC | Age: 29
End: 2019-09-03

## 2019-09-03 NOTE — TELEPHONE ENCOUNTER
From: Daiana Ahumada  To: Tiffanie Jones MD  Sent: 9/3/2019 9:07 AM EDT  Subject: Non-Urgent Medical Question    So over the weekend Im pretty sure I have myself food poisoning trying to make chicken in the [address] [place] [address] Road. Im on day 3 of a stomach ache. The first day was worse with diarrhea and vomiting. And it gets better every day yesterday only diarrhea, today just stomach cramps. Anyway, I called off work today because Im still having the stomach ache but the director of the  feels its necessary that I have a doctors note. If theres any way you can write me one that would be great. I think she just doesnt want me to be contagious with all of the kids. If not thats okay, I dont feel I need a visit Im sure Ill feel better tomorrow!

## 2019-09-05 ENCOUNTER — PATIENT MESSAGE (OUTPATIENT)
Dept: INTERNAL MEDICINE CLINIC | Age: 29
End: 2019-09-05

## 2019-09-06 RX ORDER — METRONIDAZOLE 500 MG/1
500 TABLET ORAL 3 TIMES DAILY
Qty: 21 TABLET | Refills: 0 | Status: SHIPPED | OUTPATIENT
Start: 2019-09-06 | End: 2019-09-13

## 2019-10-03 ENCOUNTER — OFFICE VISIT (OUTPATIENT)
Dept: INTERNAL MEDICINE CLINIC | Age: 29
End: 2019-10-03

## 2019-10-03 VITALS
RESPIRATION RATE: 12 BRPM | SYSTOLIC BLOOD PRESSURE: 110 MMHG | WEIGHT: 224 LBS | BODY MASS INDEX: 34.06 KG/M2 | DIASTOLIC BLOOD PRESSURE: 70 MMHG | HEART RATE: 60 BPM

## 2019-10-03 DIAGNOSIS — G43.009 MIGRAINE WITHOUT AURA AND WITHOUT STATUS MIGRAINOSUS, NOT INTRACTABLE: ICD-10-CM

## 2019-10-03 DIAGNOSIS — Z30.41 ORAL CONTRACEPTIVE PILL SURVEILLANCE: ICD-10-CM

## 2019-10-03 DIAGNOSIS — F41.0 PANIC DISORDER: ICD-10-CM

## 2019-10-03 DIAGNOSIS — J30.89 ALLERGIC RHINITIS DUE TO OTHER ALLERGIC TRIGGER, UNSPECIFIED SEASONALITY: ICD-10-CM

## 2019-10-03 PROCEDURE — 99213 OFFICE O/P EST LOW 20 MIN: CPT | Performed by: FAMILY MEDICINE

## 2019-10-03 RX ORDER — LEVONORGESTREL AND ETHINYL ESTRADIOL 0.15-0.03
KIT ORAL
Qty: 91 TABLET | Refills: 3 | Status: SHIPPED | OUTPATIENT
Start: 2019-10-03 | End: 2020-10-20 | Stop reason: SDUPTHER

## 2019-10-03 RX ORDER — TOPIRAMATE 25 MG/1
25 TABLET ORAL NIGHTLY
Qty: 30 TABLET | Refills: 5 | Status: SHIPPED | OUTPATIENT
Start: 2019-10-03 | End: 2021-01-13 | Stop reason: SINTOL

## 2019-10-03 RX ORDER — SUMATRIPTAN 100 MG/1
TABLET, FILM COATED ORAL
Qty: 9 TABLET | Refills: 12 | Status: SHIPPED | OUTPATIENT
Start: 2019-10-03 | End: 2020-01-01 | Stop reason: SDUPTHER

## 2019-10-03 RX ORDER — ALPRAZOLAM 0.5 MG/1
TABLET ORAL
Qty: 20 TABLET | Refills: 2 | Status: SHIPPED | OUTPATIENT
Start: 2019-10-03 | End: 2019-12-27 | Stop reason: SDUPTHER

## 2019-10-03 RX ORDER — FLUTICASONE PROPIONATE 50 MCG
SPRAY, SUSPENSION (ML) NASAL
Qty: 1 BOTTLE | Refills: 12 | Status: SHIPPED | OUTPATIENT
Start: 2019-10-03

## 2019-10-03 ASSESSMENT — ENCOUNTER SYMPTOMS
SHORTNESS OF BREATH: 0
CHEST TIGHTNESS: 0
WHEEZING: 0
COUGH: 0

## 2019-10-07 ENCOUNTER — OFFICE VISIT (OUTPATIENT)
Dept: PSYCHOLOGY | Age: 29
End: 2019-10-07

## 2019-10-07 DIAGNOSIS — F41.1 GAD (GENERALIZED ANXIETY DISORDER): Primary | ICD-10-CM

## 2019-10-07 PROCEDURE — 90791 PSYCH DIAGNOSTIC EVALUATION: CPT | Performed by: PSYCHOLOGIST

## 2019-10-28 ENCOUNTER — OFFICE VISIT (OUTPATIENT)
Dept: PSYCHOLOGY | Age: 29
End: 2019-10-28

## 2019-10-28 DIAGNOSIS — F90.0 ATTENTION DEFICIT HYPERACTIVITY DISORDER (ADHD), PREDOMINANTLY INATTENTIVE TYPE: ICD-10-CM

## 2019-10-28 DIAGNOSIS — F41.1 GAD (GENERALIZED ANXIETY DISORDER): Primary | ICD-10-CM

## 2019-10-28 DIAGNOSIS — Z23 NEED FOR INFLUENZA VACCINATION: Primary | ICD-10-CM

## 2019-10-28 PROCEDURE — 90471 IMMUNIZATION ADMIN: CPT | Performed by: FAMILY MEDICINE

## 2019-10-28 PROCEDURE — 90832 PSYTX W PT 30 MINUTES: CPT | Performed by: PSYCHOLOGIST

## 2019-10-28 PROCEDURE — 90686 IIV4 VACC NO PRSV 0.5 ML IM: CPT | Performed by: FAMILY MEDICINE

## 2019-10-28 ASSESSMENT — PATIENT HEALTH QUESTIONNAIRE - PHQ9
SUM OF ALL RESPONSES TO PHQ9 QUESTIONS 1 & 2: 2
2. FEELING DOWN, DEPRESSED OR HOPELESS: 1
SUM OF ALL RESPONSES TO PHQ QUESTIONS 1-9: 18
7. TROUBLE CONCENTRATING ON THINGS, SUCH AS READING THE NEWSPAPER OR WATCHING TELEVISION: 3
10. IF YOU CHECKED OFF ANY PROBLEMS, HOW DIFFICULT HAVE THESE PROBLEMS MADE IT FOR YOU TO DO YOUR WORK, TAKE CARE OF THINGS AT HOME, OR GET ALONG WITH OTHER PEOPLE: 3
9. THOUGHTS THAT YOU WOULD BE BETTER OFF DEAD, OR OF HURTING YOURSELF: 0
3. TROUBLE FALLING OR STAYING ASLEEP: 3
5. POOR APPETITE OR OVEREATING: 3
SUM OF ALL RESPONSES TO PHQ QUESTIONS 1-9: 18
6. FEELING BAD ABOUT YOURSELF - OR THAT YOU ARE A FAILURE OR HAVE LET YOURSELF OR YOUR FAMILY DOWN: 1
4. FEELING TIRED OR HAVING LITTLE ENERGY: 3
1. LITTLE INTEREST OR PLEASURE IN DOING THINGS: 1
8. MOVING OR SPEAKING SO SLOWLY THAT OTHER PEOPLE COULD HAVE NOTICED. OR THE OPPOSITE, BEING SO FIGETY OR RESTLESS THAT YOU HAVE BEEN MOVING AROUND A LOT MORE THAN USUAL: 3

## 2019-11-08 ENCOUNTER — TELEPHONE (OUTPATIENT)
Dept: INTERNAL MEDICINE CLINIC | Age: 29
End: 2019-11-08

## 2019-11-11 ENCOUNTER — TELEPHONE (OUTPATIENT)
Dept: INTERNAL MEDICINE CLINIC | Age: 29
End: 2019-11-11

## 2019-11-18 ENCOUNTER — OFFICE VISIT (OUTPATIENT)
Dept: PSYCHOLOGY | Age: 29
End: 2019-11-18

## 2019-11-18 DIAGNOSIS — F41.1 GAD (GENERALIZED ANXIETY DISORDER): Primary | ICD-10-CM

## 2019-11-18 PROCEDURE — 90832 PSYTX W PT 30 MINUTES: CPT | Performed by: PSYCHOLOGIST

## 2019-11-18 ASSESSMENT — PATIENT HEALTH QUESTIONNAIRE - PHQ9
4. FEELING TIRED OR HAVING LITTLE ENERGY: 3
3. TROUBLE FALLING OR STAYING ASLEEP: 3
SUM OF ALL RESPONSES TO PHQ QUESTIONS 1-9: 18
1. LITTLE INTEREST OR PLEASURE IN DOING THINGS: 2
7. TROUBLE CONCENTRATING ON THINGS, SUCH AS READING THE NEWSPAPER OR WATCHING TELEVISION: 3
10. IF YOU CHECKED OFF ANY PROBLEMS, HOW DIFFICULT HAVE THESE PROBLEMS MADE IT FOR YOU TO DO YOUR WORK, TAKE CARE OF THINGS AT HOME, OR GET ALONG WITH OTHER PEOPLE: 3
9. THOUGHTS THAT YOU WOULD BE BETTER OFF DEAD, OR OF HURTING YOURSELF: 0
SUM OF ALL RESPONSES TO PHQ QUESTIONS 1-9: 18
5. POOR APPETITE OR OVEREATING: 3
2. FEELING DOWN, DEPRESSED OR HOPELESS: 2
8. MOVING OR SPEAKING SO SLOWLY THAT OTHER PEOPLE COULD HAVE NOTICED. OR THE OPPOSITE, BEING SO FIGETY OR RESTLESS THAT YOU HAVE BEEN MOVING AROUND A LOT MORE THAN USUAL: 1
SUM OF ALL RESPONSES TO PHQ9 QUESTIONS 1 & 2: 4
6. FEELING BAD ABOUT YOURSELF - OR THAT YOU ARE A FAILURE OR HAVE LET YOURSELF OR YOUR FAMILY DOWN: 1

## 2019-11-20 DIAGNOSIS — G43.009 MIGRAINE WITHOUT AURA AND WITHOUT STATUS MIGRAINOSUS, NOT INTRACTABLE: ICD-10-CM

## 2019-11-21 RX ORDER — BUTALBITAL, ACETAMINOPHEN AND CAFFEINE 50; 325; 40 MG/1; MG/1; MG/1
TABLET ORAL
Qty: 30 TABLET | Refills: 0 | Status: SHIPPED | OUTPATIENT
Start: 2019-11-21 | End: 2020-03-18 | Stop reason: SDUPTHER

## 2019-11-22 ENCOUNTER — OFFICE VISIT (OUTPATIENT)
Dept: INTERNAL MEDICINE CLINIC | Age: 29
End: 2019-11-22

## 2019-11-22 VITALS
WEIGHT: 226 LBS | BODY MASS INDEX: 34.25 KG/M2 | DIASTOLIC BLOOD PRESSURE: 84 MMHG | HEIGHT: 68 IN | SYSTOLIC BLOOD PRESSURE: 110 MMHG | HEART RATE: 88 BPM

## 2019-11-22 DIAGNOSIS — Z79.899 CHRONIC USE OF BENZODIAZEPINE FOR THERAPEUTIC PURPOSE: ICD-10-CM

## 2019-11-22 DIAGNOSIS — F41.1 GAD (GENERALIZED ANXIETY DISORDER): ICD-10-CM

## 2019-11-22 DIAGNOSIS — F90.0 ATTENTION DEFICIT HYPERACTIVITY DISORDER (ADHD), PREDOMINANTLY INATTENTIVE TYPE: Primary | ICD-10-CM

## 2019-11-22 PROCEDURE — 99213 OFFICE O/P EST LOW 20 MIN: CPT | Performed by: FAMILY MEDICINE

## 2019-11-22 RX ORDER — DEXMETHYLPHENIDATE HYDROCHLORIDE 2.5 MG/1
2.5 TABLET ORAL 2 TIMES DAILY
Qty: 60 TABLET | Refills: 0 | Status: SHIPPED | OUTPATIENT
Start: 2019-11-22 | End: 2019-12-17

## 2019-12-06 ENCOUNTER — OFFICE VISIT (OUTPATIENT)
Dept: INTERNAL MEDICINE CLINIC | Age: 29
End: 2019-12-06

## 2019-12-06 VITALS
BODY MASS INDEX: 34.36 KG/M2 | DIASTOLIC BLOOD PRESSURE: 76 MMHG | SYSTOLIC BLOOD PRESSURE: 110 MMHG | HEART RATE: 64 BPM | HEIGHT: 68 IN

## 2019-12-06 DIAGNOSIS — J02.9 ACUTE PHARYNGITIS, UNSPECIFIED ETIOLOGY: Primary | ICD-10-CM

## 2019-12-06 PROCEDURE — 99213 OFFICE O/P EST LOW 20 MIN: CPT | Performed by: FAMILY MEDICINE

## 2019-12-06 RX ORDER — AMOXICILLIN 875 MG/1
875 TABLET, COATED ORAL 2 TIMES DAILY
Qty: 20 TABLET | Refills: 0 | Status: SHIPPED | OUTPATIENT
Start: 2019-12-06 | End: 2019-12-16

## 2019-12-06 ASSESSMENT — ENCOUNTER SYMPTOMS
SORE THROAT: 1
RHINORRHEA: 1
TROUBLE SWALLOWING: 0
WHEEZING: 0
FACIAL SWELLING: 0
SHORTNESS OF BREATH: 0
CHEST TIGHTNESS: 0

## 2019-12-17 ENCOUNTER — TELEPHONE (OUTPATIENT)
Dept: INTERNAL MEDICINE CLINIC | Age: 29
End: 2019-12-17

## 2019-12-17 RX ORDER — DEXMETHYLPHENIDATE HYDROCHLORIDE 5 MG/1
5 TABLET ORAL 2 TIMES DAILY
Qty: 60 TABLET | Refills: 0 | Status: SHIPPED | OUTPATIENT
Start: 2019-12-18 | End: 2020-01-17

## 2019-12-20 ENCOUNTER — PATIENT MESSAGE (OUTPATIENT)
Dept: INTERNAL MEDICINE CLINIC | Age: 29
End: 2019-12-20

## 2019-12-23 NOTE — TELEPHONE ENCOUNTER
From: Kelly Donis  To: Eliezer Zhu MD  Sent: 12/20/2019 7:55 PM EST  Subject: Prescription Question    Hi Dr. He Haile! I know you were taking vacation so its okay if there is a delayed answer, and hopefully you are enjoying yourself! !    I was wondering if you would prescribe prednisone again for migraine? A while ago you sent some in for me to keep on hand to break a cycle but I think Ive used it all. The hydroxyzine usually does the trick but isnt doing it for me this time so I figured Id ask! Thank you and tawanna Menchaca!   Mickey Tavera

## 2019-12-27 DIAGNOSIS — F41.0 PANIC DISORDER: ICD-10-CM

## 2019-12-30 RX ORDER — ALPRAZOLAM 0.5 MG/1
TABLET ORAL
Qty: 20 TABLET | Refills: 2 | Status: SHIPPED | OUTPATIENT
Start: 2019-12-30 | End: 2020-02-14 | Stop reason: SDUPTHER

## 2020-01-02 RX ORDER — SUMATRIPTAN 100 MG/1
TABLET, FILM COATED ORAL
Qty: 9 TABLET | Refills: 12 | Status: SHIPPED | OUTPATIENT
Start: 2020-01-02 | End: 2020-03-15 | Stop reason: SDUPTHER

## 2020-01-02 RX ORDER — PREDNISONE 20 MG/1
TABLET ORAL
Qty: 10 TABLET | Refills: 2 | Status: SHIPPED | OUTPATIENT
Start: 2020-01-02 | End: 2022-04-01 | Stop reason: ALTCHOICE

## 2020-01-06 ENCOUNTER — OFFICE VISIT (OUTPATIENT)
Dept: PSYCHOLOGY | Age: 30
End: 2020-01-06

## 2020-01-06 PROCEDURE — 90832 PSYTX W PT 30 MINUTES: CPT | Performed by: PSYCHOLOGIST

## 2020-01-06 NOTE — PROGRESS NOTES
Behavioral Health Consultation  Adrian Santacruz, Ph.D.  Psychologist  1/6/2020  8:49 AM      Time spent with Patient: 25 minutes  This is patient's fourth  [facility] appointment. Reason for Consult:    Chief Complaint   Patient presents with    Anxiety       Feedback given to PCP. S:  Pt seen for f/u of anxiety. Pt reported unchanged mood and sxs. Will be working FT serving at [employer], will get [employer] Had been working there PT for 11 mos. Did not stay on medication d/t illness. Wants to talk about eating bxs \"I cannot control myself. \" Mom told her she started eating excessively in grade school \"it was like I thought I was never going to eat again. \" Described secretive eating bx in childhood (mom would give her money for candy or ice cream truck expecting change, pt would spend it all and hide the extra food for later). Currently doesn't have to hide bc she lives alone. Stated she will eat 1/2 full sized bag of chips, will order 2 cheeseburgers, fries, and root beer float instead of just a hamburger and fries. Last night had leftover [employer] points, so went to purchase a few snacks for the week and ended up eating all in one sitting (2 snickers ice cream bars, 4 prasad's eggs). Denied eating to the point of discomfort, but feels disgusted by herself afterward. Sometimes will occur daily, can sometimes go 2 wks wo binges. Stated her \"ideal way\" to relax after a stressful day is to get a large quantity of snacks and lay in bed watching TV and eating. Limited other coping techniques include sleeping, doing puzzles (not doing right now bc doesn't have the right space and has 4 cats), taking care of her animals. Started [employer] in Jan in hopes weight loss would help w migraines and anxiety. Reported stressful upbringing. Mom was in an unhappy marriage w her stepdad, stated there was a lot of yelling, also directed at her.      O:  MSE:    Appearance    alert, cooperative  Appetite normal, w some binges  Sleep disturbance Yes  Fatigue Yes  Loss of pleasure Yes  Impulsive behavior Yes  Speech    spontaneous, normal rate, normal volume and well articulated  Mood    Anxious  Affect    anxiety  Thought Content    intact and cognitive distortions  Thought Process    linear, goal directed and coherent  Associations    logical connections  Insight    Fair  Judgment    Intact  Orientation    oriented to person, place, time, and general circumstances  Memory    recent and remote memory intact  Attention/Concentration    impaired  Morbid ideation No  Suicide Assessment    no suicidal ideation    History:  Social History:   Social History     Socioeconomic History    Marital status: Single     Spouse name: Not on file    Number of children: 0    Years of education: 12    Highest education level: Not on file   Occupational History    Occupation: full time student at [employer]     Comment: biology    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[address] resource strain: Not on file    Food insecurity:     Worry: Not on file     Inability: Not on file   KDPOF needs:     Medical: Not on file     Non-medical: Not on file   Tobacco Use    Smoking status: Former Smoker     Packs/day: 0.25     Years: 0.50     Pack years: 0.12     Types: Cigarettes     Start date: 2013     Last attempt to quit: 2013     Years since quittin.0    Smokeless tobacco: Never Used    Tobacco comment: Denies smoking at this time   Substance and Sexual Activity    Alcohol use: Yes     Comment: h/o weekend binges 5 or more drinks when out 1-2x/week;  :  cut most to all drinking out    Drug use: Yes     Types: Marijuana    Sexual activity: Not Currently     Partners: Male     Birth control/protection: Condom, OCP   Lifestyle    Physical activity:     Days per week: Not on file     Minutes per session: Not on file    Stress: Not on file   Relationships    Social connections:     Talks on phone: Not on file     Gets together: Not on file     Attends [employer] service: Not on file     Active member of club or organization: Not on file     Attends meetings of clubs or organizations: Not on file     Relationship status: Not on file    Intimate partner violence:     Fear of current or ex partner: Not on file     Emotionally abused: Not on file     Physically abused: Not on file     Forced sexual activity: Not on file   Other Topics Concern    Not on file   Social History Narrative    Not on file     TOBACCO:   reports that she quit smoking about 6 years ago. Her smoking use included cigarettes. She started smoking about 6 years ago. She has a 0.13 pack-year smoking history. She has never used smokeless tobacco.  ETOH:   reports current alcohol use. Diagnosis:  1. VERA (generalized anxiety disorder)    2. Attention deficit hyperactivity disorder (ADHD), predominantly inattentive type          Plan:  Pt interventions:  Discussed and problem-solved barriers in adhering to behavioral change plan and Provided Psychoeducation re: binge eating disorder        Documentation was done using voice recognition dragon software. Every effort was made to ensure accuracy; however, inadvertent, unintentional computerized transcription errors may be present.

## 2020-01-08 RX ORDER — PROCHLORPERAZINE MALEATE 10 MG
10 TABLET ORAL EVERY 8 HOURS PRN
Qty: 30 TABLET | Refills: 1 | Status: SHIPPED | OUTPATIENT
Start: 2020-01-08 | End: 2020-06-30 | Stop reason: SDUPTHER

## 2020-02-14 ENCOUNTER — OFFICE VISIT (OUTPATIENT)
Dept: INTERNAL MEDICINE CLINIC | Age: 30
End: 2020-02-14

## 2020-02-14 VITALS
DIASTOLIC BLOOD PRESSURE: 80 MMHG | HEART RATE: 72 BPM | WEIGHT: 220 LBS | SYSTOLIC BLOOD PRESSURE: 122 MMHG | BODY MASS INDEX: 33.34 KG/M2 | HEIGHT: 68 IN

## 2020-02-14 PROCEDURE — 99213 OFFICE O/P EST LOW 20 MIN: CPT | Performed by: FAMILY MEDICINE

## 2020-02-14 RX ORDER — ALPRAZOLAM 0.5 MG/1
TABLET ORAL
Qty: 20 TABLET | Refills: 2 | Status: SHIPPED | OUTPATIENT
Start: 2020-02-14 | End: 2020-05-08 | Stop reason: SDUPTHER

## 2020-02-14 RX ORDER — ONDANSETRON 4 MG/1
4 TABLET, FILM COATED ORAL EVERY 8 HOURS PRN
Qty: 20 TABLET | Refills: 5 | Status: SHIPPED
Start: 2020-02-14 | End: 2021-01-13 | Stop reason: SDUPTHER

## 2020-02-14 SDOH — ECONOMIC STABILITY: INCOME INSECURITY: HOW HARD IS IT FOR YOU TO PAY FOR THE VERY BASICS LIKE FOOD, HOUSING, MEDICAL CARE, AND HEATING?: NOT HARD AT ALL

## 2020-02-14 SDOH — ECONOMIC STABILITY: TRANSPORTATION INSECURITY
IN THE PAST 12 MONTHS, HAS LACK OF TRANSPORTATION KEPT YOU FROM MEETINGS, WORK, OR FROM GETTING THINGS NEEDED FOR DAILY LIVING?: NO

## 2020-02-14 SDOH — ECONOMIC STABILITY: FOOD INSECURITY: WITHIN THE PAST 12 MONTHS, YOU WORRIED THAT YOUR FOOD WOULD RUN OUT BEFORE YOU GOT MONEY TO BUY MORE.: NEVER TRUE

## 2020-02-14 SDOH — ECONOMIC STABILITY: TRANSPORTATION INSECURITY
IN THE PAST 12 MONTHS, HAS THE LACK OF TRANSPORTATION KEPT YOU FROM MEDICAL APPOINTMENTS OR FROM GETTING MEDICATIONS?: NO

## 2020-02-14 SDOH — ECONOMIC STABILITY: FOOD INSECURITY: WITHIN THE PAST 12 MONTHS, THE FOOD YOU BOUGHT JUST DIDN'T LAST AND YOU DIDN'T HAVE MONEY TO GET MORE.: NEVER TRUE

## 2020-02-14 ASSESSMENT — PATIENT HEALTH QUESTIONNAIRE - PHQ9
SUM OF ALL RESPONSES TO PHQ9 QUESTIONS 1 & 2: 0
1. LITTLE INTEREST OR PLEASURE IN DOING THINGS: 0
SUM OF ALL RESPONSES TO PHQ QUESTIONS 1-9: 0
SUM OF ALL RESPONSES TO PHQ QUESTIONS 1-9: 0
2. FEELING DOWN, DEPRESSED OR HOPELESS: 0

## 2020-03-09 ENCOUNTER — OFFICE VISIT (OUTPATIENT)
Dept: PSYCHOLOGY | Age: 30
End: 2020-03-09
Payer: COMMERCIAL

## 2020-03-09 PROCEDURE — 90832 PSYTX W PT 30 MINUTES: CPT | Performed by: PSYCHOLOGIST

## 2020-03-09 ASSESSMENT — PATIENT HEALTH QUESTIONNAIRE - PHQ9
3. TROUBLE FALLING OR STAYING ASLEEP: 3
7. TROUBLE CONCENTRATING ON THINGS, SUCH AS READING THE NEWSPAPER OR WATCHING TELEVISION: 3
SUM OF ALL RESPONSES TO PHQ9 QUESTIONS 1 & 2: 4
8. MOVING OR SPEAKING SO SLOWLY THAT OTHER PEOPLE COULD HAVE NOTICED. OR THE OPPOSITE, BEING SO FIGETY OR RESTLESS THAT YOU HAVE BEEN MOVING AROUND A LOT MORE THAN USUAL: 1
1. LITTLE INTEREST OR PLEASURE IN DOING THINGS: 3
SUM OF ALL RESPONSES TO PHQ QUESTIONS 1-9: 17
6. FEELING BAD ABOUT YOURSELF - OR THAT YOU ARE A FAILURE OR HAVE LET YOURSELF OR YOUR FAMILY DOWN: 1
2. FEELING DOWN, DEPRESSED OR HOPELESS: 1
4. FEELING TIRED OR HAVING LITTLE ENERGY: 3
10. IF YOU CHECKED OFF ANY PROBLEMS, HOW DIFFICULT HAVE THESE PROBLEMS MADE IT FOR YOU TO DO YOUR WORK, TAKE CARE OF THINGS AT HOME, OR GET ALONG WITH OTHER PEOPLE: 3
9. THOUGHTS THAT YOU WOULD BE BETTER OFF DEAD, OR OF HURTING YOURSELF: 0
SUM OF ALL RESPONSES TO PHQ QUESTIONS 1-9: 17
5. POOR APPETITE OR OVEREATING: 2

## 2020-03-09 NOTE — PROGRESS NOTES
Behavioral Health Consultation  Lise Peters, Ph.D.  Psychologist  3/9/2020  2:38 PM      Time spent with Patient: 30 minutes  This is patient's fifth  [facility] appointment. Reason for Consult:    Chief Complaint   Patient presents with    Anxiety       Feedback given to PCP. S:  Pt seen for f/u of anxiety and ADHD, BED. Pt reported improved mood and sxs w new job in which she is making considerably more income. Doesn't like being on lunch shift, but hopes to eventually move to dinner. Anxious about having a date tomorrow. Nervous about running out of things to talk about, which she knows never happens. Last real date was in college \"it did not go well. \" worries bc she believes she is attracted to narcisists. Discussed her engagement in adaptive problem solving.      O:  MSE:    Appearance    alert, cooperative  Appetite abnormal: variable  Sleep disturbance Yes  Fatigue Yes  Loss of pleasure Yes  Impulsive behavior Yes  Speech    spontaneous, normal rate, normal volume and well articulated  Mood    Anxious  Affect    anxiety  Thought Content    intact and cognitive distortions  Thought Process    linear, goal directed and coherent  Associations    logical connections  Insight    Fair  Judgment    Intact  Orientation    oriented to person, place, time, and general circumstances  Memory    recent and remote memory intact  Attention/Concentration    impaired  Morbid ideation No  Suicide Assessment    no suicidal ideation    History:  Social History:   Social History     Socioeconomic History    Marital status: Single     Spouse name: Not on file    Number of children: 0    Years of education: 12    Highest education level: Not on file   Occupational History    Occupation: full time student at [employer]     Comment: biology    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 VERA (generalized anxiety disorder)    2. Attention deficit hyperactivity disorder (ADHD), predominantly inattentive type          Plan:  Pt interventions:  Trained in strategies for increasing balanced thinking, Supportive techniques and Identified maladaptive thoughts        Documentation was done using voice recognition dragon software. Every effort was made to ensure accuracy; however, inadvertent, unintentional computerized transcription errors may be present.

## 2020-03-16 RX ORDER — SUMATRIPTAN 100 MG/1
TABLET, FILM COATED ORAL
Qty: 9 TABLET | Refills: 12 | Status: SHIPPED | OUTPATIENT
Start: 2020-03-16 | End: 2020-06-05

## 2020-03-25 ENCOUNTER — TELEPHONE (OUTPATIENT)
Dept: PSYCHOLOGY | Age: 30
End: 2020-03-25

## 2020-03-26 ENCOUNTER — OFFICE VISIT (OUTPATIENT)
Dept: PSYCHOLOGY | Age: 30
End: 2020-03-26
Payer: COMMERCIAL

## 2020-03-26 PROCEDURE — 98968 PH1 ASSMT&MGMT NQHP 21-30: CPT | Performed by: PSYCHOLOGIST

## 2020-03-26 NOTE — PROGRESS NOTES
[address] [facility], [address] [place] [address] Phone: [phone]  Work Phone: [phone]  Relation: Parent  Secondary Emergency Contact: Dorita Baumann  Address: [address] [facility], [address] [place] [address] Phone: [phone]  Work Phone: [phone]  Relation: Parent     Provider location: [address] affirm this is an episode with a patient who has not had a related appointment within my department in the past 7 days or scheduled within the next 24 hours. S:  Pt seen for f/u of anxiety. Pt reported overall positive mood and sxs. Has been unemployed d/t [address], but thinks she will be okay financially. Did have a good date \"it was fine. \" Thinks he is interested in her, but sending some mixed signals. Proud of herself for being appropriately invested. \"I think I'm more depressed than I think sometimes. \" Predominately sites motivation and household chores as an indicator, denied depressed mood. Stated she may go 3 days wo a shower and will not clean to the point that she cannot have people over. Reported she gets too interested in other activities to emphasize house work/hygiene and explored how this may be indicative of unsuccessful tx of ADHD. Provided bx techniques. Re: COVID anxiety, reported she is doing better now that she is avoiding articles she sees on Facebook and getting news from reputable sources.      O:  MSE:    Appetite normal  Sleep disturbance No  Fatigue Yes  Loss of pleasure No  Impulsive behavior Yes  Speech    spontaneous, normal rate, normal volume and well articulated  Mood    euthymic  Affect    normal affect  Thought Content    intact  Thought Process    linear, goal directed and coherent  Associations    logical connections  Insight    Fair  Judgment    Intact  Orientation    oriented to person, place, time, and general circumstances  Memory    recent and remote memory intact  Attention/Concentration

## 2020-04-27 ENCOUNTER — VIRTUAL VISIT (OUTPATIENT)
Dept: PSYCHOLOGY | Age: 30
End: 2020-04-27
Payer: COMMERCIAL

## 2020-04-27 PROCEDURE — 90832 PSYTX W PT 30 MINUTES: CPT | Performed by: PSYCHOLOGIST

## 2020-04-27 NOTE — PROGRESS NOTES
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   [place]-Fay insecurity     Worry: Never true     Inability: Never true   [employer] needs     Medical: No     Non-medical: No   Tobacco Use    Smoking status: Former Smoker     Packs/day: 0.25     Years: 0.50     Pack years: 0.12     Types: Cigarettes     Start date: 2013     Last attempt to quit: 2013     Years since quittin.3    Smokeless tobacco: Never Used    Tobacco comment: Denies smoking at this time   Substance and Sexual Activity    Alcohol use: Yes     Comment: h/o weekend binges 5 or more drinks when out 1-2x/week;  :  cut most to all drinking out    Drug use: Yes     Types: Marijuana    Sexual activity: Not Currently     Partners: Male     Birth control/protection: Condom, OCP   Lifestyle    Physical activity     Days per week: Not on file     Minutes per session: Not on file    Stress: Not on file   Relationships    Social connections     Talks on phone: Not on file     Gets together: Not on file     Attends Temple service: Not on file     Active member of club or organization: Not on file     Attends meetings of clubs or organizations: Not on file     Relationship status: Not on file    Intimate partner violence     Fear of current or ex partner: Not on file     Emotionally abused: Not on file     Physically abused: Not on file     Forced sexual activity: Not on file   Other Topics Concern    Not on file   Social History Narrative    Not on file     TOBACCO:   reports that she quit smoking about 6 years ago. Her smoking use included cigarettes. She started smoking about 6 years ago. She has a 0.13 pack-year smoking history. She has never used smokeless tobacco.  ETOH:   reports current alcohol use.       Diagnosis:  generalized anxiety disorder w panic attack  ADHD    Plan:  Pt interventions:  Provided Psychoeducation re: managing

## 2020-05-08 ENCOUNTER — VIRTUAL VISIT (OUTPATIENT)
Dept: INTERNAL MEDICINE CLINIC | Age: 30
End: 2020-05-08
Payer: COMMERCIAL

## 2020-05-08 VITALS — WEIGHT: 223 LBS | HEIGHT: 68 IN | BODY MASS INDEX: 33.8 KG/M2

## 2020-05-08 PROCEDURE — 99213 OFFICE O/P EST LOW 20 MIN: CPT | Performed by: FAMILY MEDICINE

## 2020-05-08 RX ORDER — FREMANEZUMAB-VFRM 225 MG/1.5ML
225 INJECTION SUBCUTANEOUS
Qty: 1.5 ML | Refills: 2 | Status: SHIPPED | OUTPATIENT
Start: 2020-05-08 | End: 2021-03-21 | Stop reason: SDUPTHER

## 2020-05-08 RX ORDER — ALPRAZOLAM 0.5 MG/1
TABLET ORAL
Qty: 20 TABLET | Refills: 2 | Status: SHIPPED | OUTPATIENT
Start: 2020-05-08 | End: 2020-07-31 | Stop reason: SDUPTHER

## 2020-05-08 ASSESSMENT — ENCOUNTER SYMPTOMS
CONSTIPATION: 0
SHORTNESS OF BREATH: 0
DIARRHEA: 0
VOMITING: 0
NAUSEA: 0

## 2020-05-08 NOTE — PROGRESS NOTES
Years: 0.50     Pack years: 0.12     Types: Cigarettes     Start date: 2013     Last attempt to quit: 2013     Years since quittin.3    Smokeless tobacco: Never Used    Tobacco comment: Denies smoking at this time   Substance Use Topics    Alcohol use: Yes     Comment: h/o weekend binges 5 or more drinks when out 1-2x/week;  :  cut most to all drinking out    Drug use: Yes     Types: Marijuana           Review of Systems   Constitutional: Negative for unexpected weight change. Respiratory: Negative for shortness of breath (during panic attack only;  not with beta blocker). Gastrointestinal: Negative for constipation, diarrhea, nausea and vomiting. Endocrine: Negative for polyphagia. Musculoskeletal: Negative for gait problem and neck stiffness. Skin: Negative for rash. Neurological: Positive for headaches. Negative for dizziness, tremors, seizures, syncope, speech difficulty, light-headedness and numbness. Psychiatric/Behavioral: Negative for behavioral problems, confusion and dysphoric mood. The patient is nervous/anxious. Objective:   Physical Exam  Constitutional:       Appearance: Normal appearance. Eyes:      General: No scleral icterus. Pulmonary:      Effort: Pulmonary effort is normal. No respiratory distress. Skin:     Coloration: Skin is not pale. Neurological:      General: No focal deficit present. Mental Status: She is alert and oriented to person, place, and time.    Psychiatric:         Mood and Affect: Mood normal.         Behavior: Behavior normal.          Wt Readings from Last 3 Encounters:   20 223 lb (101.2 kg)   20 220 lb (99.8 kg)   19 226 lb (102.5 kg)     Temp Readings from Last 3 Encounters:   19 97.9 °F (36.6 °C) (Oral)   18 99.9 °F (37.7 °C) (Oral)   06/10/16 97.2 °F (36.2 °C) (Oral)     BP Readings from Last 3 Encounters:   20 122/80   19 110/76   19 110/84     Pulse Readings from

## 2020-05-11 ENCOUNTER — VIRTUAL VISIT (OUTPATIENT)
Dept: PSYCHOLOGY | Age: 30
End: 2020-05-11
Payer: COMMERCIAL

## 2020-05-11 PROCEDURE — 90832 PSYTX W PT 30 MINUTES: CPT | Performed by: PSYCHOLOGIST

## 2020-05-11 NOTE — PROGRESS NOTES
Jose R Arora, Ph.D.  Psychologist  5/11/2020  3:30 PM      Time spent with Patient: 30 minutes  This is patient's eighth  [facility] appointment. Reason for Consult:    Chief Complaint   Patient presents with    Anxiety       Feedback given to PCP. S:  Pt seen for f/u of anxiety and ADHD. Pt reported slightly improved mood and sxs. Has been using cupped breathing and will start using PMR soon. Has had 2-3 panic attacks in last 2 wks, which is significant improvement over previous frequency. Able to identify at least two attacks: concerned about over-sleeping through an appt. Stated she has long been a heavy sleeper, as a teen would asleep on toilet in the morning. Compensates by setting several alarms starting at least an hour before she needs to wake. Has been dx'ed w TETE, never got CPAP bc she lost insurance at that time. Focused on motivation to proceed w obtaining mask. Wanting to start hiking, mom fearful of her going alone. Going to [place] in 2 wks, staying w friend who has also been social isolating. Some anxiety about upcoming trip, some mild hair pulling on her eyebrow. Hopes to resume work in mid-June.      O:  MSE:    Appearance    alert, cooperative  Appetite normal  Sleep disturbance Yes  Fatigue Yes  Loss of pleasure No  Impulsive behavior Yes  Speech    spontaneous, normal rate, normal volume and well articulated  Mood    Anxious  Affect    normal affect  Thought Content    intact, cognitive distortions and all or nothing thinking  Thought Process    linear, goal directed and coherent  Associations    logical connections  Insight    Fair  Judgment    Intact  Orientation    oriented to person, place, time, and general circumstances  Memory    recent and remote memory intact  Attention/Concentration    intact  Morbid ideation No  Suicide Assessment    no suicidal ideation    History:  Social History:   Social History     Socioeconomic History    Marital status: Single     Spouse name: Not on file

## 2020-05-19 ENCOUNTER — PATIENT MESSAGE (OUTPATIENT)
Dept: INTERNAL MEDICINE CLINIC | Age: 30
End: 2020-05-19

## 2020-05-20 RX ORDER — METOCLOPRAMIDE 10 MG/1
TABLET ORAL
Qty: 90 TABLET | Refills: 0 | Status: SHIPPED | OUTPATIENT
Start: 2020-05-20 | End: 2020-11-13 | Stop reason: SDUPTHER

## 2020-06-01 ENCOUNTER — VIRTUAL VISIT (OUTPATIENT)
Dept: PSYCHOLOGY | Age: 30
End: 2020-06-01
Payer: COMMERCIAL

## 2020-06-01 PROCEDURE — 90832 PSYTX W PT 30 MINUTES: CPT | Performed by: PSYCHOLOGIST

## 2020-06-01 NOTE — PROGRESS NOTES
TELEHEALTH VISIT -- Audio Only (During GVTZL-56 public health emergency)  }  Pursuant to the emergency declaration under the [address] [place] [address], [facility] waiver authority and the [employer] and [employer] Act, this phone call was conducted, with patient's consent, to reduce the patient's risk of exposure to COVID-19 and provide continuity of care for an established patient. Services were provided through a phone call discussion to substitute for in-person clinic visit. Pt gave verbal informed consent to participate in telehealth services. Consent:  She and/or health care decision maker is aware that that she may receive a bill for this telephone service, depending on her insurance coverage, and has provided verbal consent to proceed: Yes    Conducted a risk-benefit analysis and determined that the patient's presenting problems are consistent with the use of telepsychology. Determined that the patient has sufficient knowledge and skills in the use of technology enabling them to adequately benefit from telepsychology. It was determined that this patient was able to be properly treated without an in-person session. Patient verified that they were currently located at the [facility] address that was provided during registration.     Verified the following information:  Patient's identification: Yes  Patient location: [address] [place] [address]  Patient's call back number: [phone]   Patient's emergency contact's name and number, as well as permission to contact them if needed: Extended Emergency Contact Information  Primary Emergency Contact: Blair Mckeon  Address: [address] [place] [address], [address] [place] [address] Phone: [phone]  Work Phone: [phone]  Relation: Parent  Secondary Emergency Contact: Dorita Baumann  Address: [address] [place] [address], [address] [place] [address] Fair  Judgment    Intact  Orientation    oriented to person, place, time, and general circumstances  Memory    recent and remote memory intact  Attention/Concentration    impaired  Morbid ideation No  Suicide Assessment    no suicidal ideation    History:  Social History:   Social History     Socioeconomic History    Marital status: Single     Spouse name: Not on file    Number of children: 0    Years of education: 15    Highest education level: Not on file   Occupational History    Occupation: full time student at [employer]     Comment: biology    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 all   [place]-Fay insecurity     Worry: Never true     Inability: Never true   [employer] needs     Medical: No     Non-medical: No   Tobacco Use    Smoking status: Former Smoker     Packs/day: 0.25     Years: 0.50     Pack years: 0.12     Types: Cigarettes     Start date: 2013     Last attempt to quit: 2013     Years since quittin.4    Smokeless tobacco: Never Used    Tobacco comment: Denies smoking at this time   Substance and Sexual Activity    Alcohol use: Yes     Comment: h/o weekend binges 5 or more drinks when out 1-2x/week;  :  cut most to all drinking out    Drug use: Yes     Types: Marijuana    Sexual activity: Not Currently     Partners: Male     Birth control/protection: Condom, OCP   Lifestyle    Physical activity     Days per week: Not on file     Minutes per session: Not on file    Stress: Not on file   Relationships    Social connections     Talks on phone: Not on file     Gets together: Not on file     Attends Advent service: Not on file     Active member of club or organization: Not on file     Attends meetings of clubs or organizations: Not on file     Relationship status: Not on file    Intimate partner violence     Fear of current or ex partner: Not on file     Emotionally abused: Not on file     Physically

## 2020-06-05 RX ORDER — SUMATRIPTAN 100 MG/1
TABLET, FILM COATED ORAL
Qty: 9 TABLET | Refills: 11 | Status: SHIPPED | OUTPATIENT
Start: 2020-06-05 | End: 2021-03-18

## 2020-07-01 RX ORDER — PROCHLORPERAZINE MALEATE 10 MG
10 TABLET ORAL EVERY 8 HOURS PRN
Qty: 30 TABLET | Refills: 1 | Status: SHIPPED | OUTPATIENT
Start: 2020-07-01 | End: 2021-09-28 | Stop reason: ALTCHOICE

## 2020-07-14 ENCOUNTER — TELEPHONE (OUTPATIENT)
Dept: INTERNAL MEDICINE CLINIC | Age: 30
End: 2020-07-14

## 2020-07-14 NOTE — TELEPHONE ENCOUNTER
Spoke with pt she was wanting to come in and get a drug screening, but she was called sooner than she thought for her interview. So she will just wait and see what the drug screen shows at the job.

## 2020-07-21 ENCOUNTER — OFFICE VISIT (OUTPATIENT)
Dept: PRIMARY CARE CLINIC | Age: 30
End: 2020-07-21
Payer: COMMERCIAL

## 2020-07-21 PROCEDURE — 99211 OFF/OP EST MAY X REQ PHY/QHP: CPT | Performed by: NURSE PRACTITIONER

## 2020-07-21 NOTE — PATIENT INSTRUCTIONS

## 2020-07-21 NOTE — PROGRESS NOTES
Nik Najera received a viral test for COVID-19. They were educated on isolation and quarantine as appropriate. For any symptoms, they were directed to seek care from their PCP, given contact information to establish with a doctor, directed to an urgent care or the emergency room.

## 2020-07-27 LAB
SARS-COV-2: NOT DETECTED
SOURCE: NORMAL

## 2020-07-31 ENCOUNTER — PATIENT MESSAGE (OUTPATIENT)
Dept: INTERNAL MEDICINE CLINIC | Age: 30
End: 2020-07-31

## 2020-07-31 ENCOUNTER — VIRTUAL VISIT (OUTPATIENT)
Dept: INTERNAL MEDICINE CLINIC | Age: 30
End: 2020-07-31
Payer: COMMERCIAL

## 2020-07-31 PROBLEM — F41.0 PANIC DISORDER: Status: ACTIVE | Noted: 2020-07-31

## 2020-07-31 PROBLEM — K58.0 IRRITABLE BOWEL SYNDROME WITH DIARRHEA: Status: ACTIVE | Noted: 2020-07-31

## 2020-07-31 PROCEDURE — 99213 OFFICE O/P EST LOW 20 MIN: CPT | Performed by: FAMILY MEDICINE

## 2020-07-31 RX ORDER — DEXMETHYLPHENIDATE HYDROCHLORIDE 2.5 MG/1
2.5 TABLET ORAL 2 TIMES DAILY
Qty: 60 TABLET | Refills: 0 | Status: SHIPPED
Start: 2020-07-31 | End: 2020-08-03 | Stop reason: SINTOL

## 2020-07-31 RX ORDER — ALPRAZOLAM 0.5 MG/1
TABLET ORAL
Qty: 20 TABLET | Refills: 2 | Status: SHIPPED | OUTPATIENT
Start: 2020-08-09 | End: 2020-10-20 | Stop reason: SDUPTHER

## 2020-07-31 RX ORDER — DICYCLOMINE HYDROCHLORIDE 10 MG/1
10 CAPSULE ORAL 2 TIMES DAILY PRN
Qty: 360 CAPSULE | Refills: 1 | Status: SHIPPED | OUTPATIENT
Start: 2020-07-31 | End: 2020-10-20 | Stop reason: ALTCHOICE

## 2020-07-31 ASSESSMENT — ENCOUNTER SYMPTOMS
ABDOMINAL PAIN: 0
SHORTNESS OF BREATH: 0
CONSTIPATION: 0
NAUSEA: 0

## 2020-07-31 NOTE — PROGRESS NOTES
Subjective:      Patient ID: Aleena Aguilar is a 34 y.o. female. TELEHEALTH EVALUATION -- Audio/Visual (During RVXTW-69 public health emergency)    Due to this being a TeleHealth encounter, examination is limited}  vis  Pursuant to the emergency declaration under the [facility], [facility] waiver authority and the [employer] and [employer] Act, this Virtual  Visit was conducted, with patient's consent, to reduce the patient's risk of exposure to COVID-19 and provide continuity of care for an established patient. Services were provided through a video synchronous discussion virtually to substitute for in-person clinic visit. Secure platform Doxy. me used for the [employer]. Services provided to the patient at his/her home with the provider physically  at her home or her office. The office visit was started on video but there were some technical issues which I assume was her Internet because she was sitting outside in a vehicle. We finished the visit with a telephone call to make sure everything was understood and concluded    Home vital signs if applicable:  recorded in chart and noted. All home /reported readings    (I reviewed Med list, allergies, smoking, etoh, drug history)    TIME OF VIRTUAL VISIT:  15 min        HPI   Chief Complaint   Patient presents with    3 Month Follow-Up     90 day follow up for controlled substance(s)  Xanax prn panic attaack. OD risk:  220  Lorazepam Equiv dose (if applicable)  1.39 mg (not used daily so this is an average)  with last refill 7/10 and prior 6/14. Using medication for:  Panic disorder. Functional improvement noted compared to before taking this controlled medication:  Able to break an attack and do her work, get to work. 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 Has ADD.   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  for [address]. Focalin 2.5 mg did help her focus and tolerated it unlike Adderrall, but she had urge to repeatedly empty her bowels. Not watery diarrhea but felt tenesmus and frequent stooling after pill kicked in. Her migraines are better. She took my advice about medication overuse causing HA and stopped medication. Terrible for 5 days but now headaches are doing much better and pleased with the improvement. Overall she is doing okay. She is seeing Dr. Bob Hawhtorne for therapy. See Assessment for more detail on conditions addressed today. Patient Active Problem List   Diagnosis    Migraine without aura and without status migrainosus, not intractable    ADHD (attention deficit hyperactivity disorder)    Allergic rhinitis    Kidney stone    Hypersomnia    VERA (generalized anxiety disorder)    Oral contraceptive pill surveillance    GERD (gastroesophageal reflux disease)    Cervico-occipital neuralgia    Chronic use of benzodiazepine for therapeutic purpose    Obstructive sleep apnea (adult) (pediatric)         Outpatient Medications Marked as Taking for the 7/31/20 encounter (Virtual Visit) with Landen Joe MD   Medication Sig Dispense Refill    ALPRAZolam (XANAX) 0.5 MG tablet TAKE 1 TABLET BY MOUTH AT ONSET OF PANIC ATTACK AND REPEAT SECOND DOSE IN ONE HOUR IF NOT RESOLVED 20 tablet 2    Fremanezumab-vfrm (AJOVY) 225 MG/1.5ML SOAJ Inject 225 mg into the skin every 30 days 1.5 mL 2    escitalopram (LEXAPRO) 20 MG tablet Take 1 tablet by mouth daily TAKE 1 TABLET BY MOUTH EVERY DAY 30 tablet 5    fluticasone (FLONASE) 50 MCG/ACT nasal spray 1-2 sprays each nostril daily. Must be used regularly to help.  1 Bottle 12    levonorgestrel-ethinyl estradiol (INTROVALE) 0.15-0.03 MG per tablet TAKE 1 TABLET BY MOUTH DAILY 91 tablet 3    topiramate (TOPAMAX) 25 MG tablet Take 1 tablet by mouth nightly 30 tablet 5    omeprazole (PRILOSEC) 20 MG delayed release capsule Take 1 capsule by mouth daily as needed (for flare up. take until well and then stop for a while) 30 capsule 3    clindamycin-benzoyl peroxide (BENZACLIN) 1-5 % gel APPLY EXTERNALLY TO THE AFFECTED AREA TWICE DAILY 150 g 3    cetirizine (ZYRTEC) 10 MG tablet Take 1 tablet by mouth daily Works best to take at bedtime. 90 tablet 3       Social History     Tobacco Use    Smoking status: Former Smoker     Packs/day: 0.25     Years: 0.50     Pack years: 0.12     Types: Cigarettes     Start date: 2013     Last attempt to quit: 2013     Years since quittin.5    Smokeless tobacco: Never Used    Tobacco comment: Denies smoking at this time   Substance Use Topics    Alcohol use: Yes     Comment: h/o weekend binges 5 or more drinks when out 1-2x/week;  :  cut most to all drinking out    Drug use: Yes     Types: Marijuana         Review of Systems   Respiratory: Negative for shortness of breath. Gastrointestinal: Negative for abdominal pain, constipation and nausea. Skin: Negative for rash. Neurological: Negative for dizziness and seizures. Psychiatric/Behavioral: Negative for suicidal ideas. Objective:   Physical Exam  Constitutional:       Appearance: Normal appearance. Eyes:      General: No scleral icterus. Pulmonary:      Effort: Pulmonary effort is normal. No respiratory distress. Skin:     Coloration: Skin is not pale. Neurological:      General: No focal deficit present. Mental Status: She is alert and oriented to person, place, and time.    Psychiatric:         Mood and Affect: Mood normal.         Behavior: Behavior normal.      Reported weight is 215 pounds  Wt Readings from Last 3 Encounters:   20 223 lb (101.2 kg)   20 220 lb (99.8 kg)   19 226 lb (102.5 kg)     Temp Readings from Last 3 Encounters:   19 97.9 °F (36.6 °C) (Oral)   18 99.9 °F (37.7 °C) (Oral)   06/10/16 97.2 °F (36.2 °C) (Oral)     BP Readings from Last 3 Encounters:   02/14/20 122/80   12/06/19 110/76   11/22/19 110/84     Pulse Readings from Last 3 Encounters:   02/14/20 72   12/06/19 64   11/22/19 88         Assessment:      1. Attention deficit hyperactivity disorder (ADHD), predominantly inattentive type  She will again try the stimulant. We may consider other medication but she has been sensitive to both dexamphetamine and methylphenidate in the past.   Consider Strattera. It was stopped due to cost or was not taken due to cost  - dexmethylphenidate (FOCALIN) 2.5 MG tablet; Take 1 tablet by mouth 2 times daily for 30 days. Dispense: 60 tablet; Refill: 0    2. Chronic use of benzodiazepine for therapeutic purpose  OARRS profile checked and is valid. 3. Panic disorder  PRN use and continue therapy with PhD  - ALPRAZolam (XANAX) 0.5 MG tablet; TAKE 1 TABLET BY MOUTH AT ONSET OF PANIC ATTACK AND REPEAT SECOND DOSE IN ONE HOUR IF NOT RESOLVED  Dispense: 20 tablet; Refill: 2    4. Irritable bowel syndrome with diarrhea  Have her try medication at the same time as the Focalin and see if this will let her get past the bowel issues related to the Focalin use  - dicyclomine (BENTYL) 10 MG capsule; Take 1 capsule by mouth 2 times daily as needed (diarrhea or frequent BM)  Dispense: 360 capsule; Refill: 1          Plan:      See orders. See after visit summary, patient instructions, and reference hand-outs. A PRINTED SUMMARY = AVS GIVEN TO THE PATIENT, (or if Virtual Visit, patient told it is available in My Chart or will be mailed if not active on My Chart.)    Discussed use, benefit, and side effects of prescribed medications. Barriers to medication compliance addressed. All patient questions answered.           Landen Joe MD

## 2020-08-03 RX ORDER — DEXTROAMPHETAMINE SACCHARATE, AMPHETAMINE ASPARTATE MONOHYDRATE, DEXTROAMPHETAMINE SULFATE AND AMPHETAMINE SULFATE 1.25; 1.25; 1.25; 1.25 MG/1; MG/1; MG/1; MG/1
5 CAPSULE, EXTENDED RELEASE ORAL EVERY MORNING
Qty: 30 CAPSULE | Refills: 0 | Status: SHIPPED
Start: 2020-08-03 | End: 2020-08-12 | Stop reason: DRUGHIGH

## 2020-08-03 RX ORDER — PROPRANOLOL HYDROCHLORIDE 10 MG/1
TABLET ORAL
Qty: 30 TABLET | Refills: 1 | Status: SHIPPED
Start: 2020-08-03 | End: 2020-11-05 | Stop reason: ALTCHOICE

## 2020-08-03 NOTE — TELEPHONE ENCOUNTER
From: Deonna Patel  To: Oralia Steven MD  Sent: 7/31/2020 9:18 PM EDT  Subject: Non-Urgent Medical Question    Hi! I was looking at our after visit notes and I dont think I have ever tried adderall only Ritalin, Focalin, and concerta. I was wondering if you think that would be worth a try before trying the antispasmatic? Let me know your thoughts. Thank you!   Tameka Munoz

## 2020-09-23 RX ORDER — BUTALBITAL, ACETAMINOPHEN AND CAFFEINE 50; 325; 40 MG/1; MG/1; MG/1
TABLET ORAL
Qty: 30 TABLET | Refills: 1 | Status: SHIPPED | OUTPATIENT
Start: 2020-09-23 | End: 2021-06-24

## 2020-09-25 ENCOUNTER — PATIENT MESSAGE (OUTPATIENT)
Dept: INTERNAL MEDICINE CLINIC | Age: 30
End: 2020-09-25

## 2020-09-25 RX ORDER — DEXTROAMPHETAMINE SACCHARATE, AMPHETAMINE ASPARTATE MONOHYDRATE, DEXTROAMPHETAMINE SULFATE AND AMPHETAMINE SULFATE 5; 5; 5; 5 MG/1; MG/1; MG/1; MG/1
20 CAPSULE, EXTENDED RELEASE ORAL EVERY MORNING
Qty: 30 CAPSULE | Refills: 0 | Status: SHIPPED | OUTPATIENT
Start: 2020-09-25 | End: 2020-10-20 | Stop reason: SDUPTHER

## 2020-09-25 NOTE — TELEPHONE ENCOUNTER
From: Berna Palomino  To: Saran Keys MD  Sent: 9/25/2020 3:59 PM EDT  Subject: Prescription Question    Hello! Good news, my eye is better! It got better pretty fast, about the day after the wedding it was fine! I think the eye drops helped. Also I am ready for a refill on my medicine, still doing well with no major side effects. I'm wondering if we can bump it up to 20mg now that I have adjusted to the 15mg. 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 I have been doing well so far but still get a little distracted.     Thank you,    Jeffery Bales

## 2020-09-28 ENCOUNTER — TELEPHONE (OUTPATIENT)
Dept: INTERNAL MEDICINE CLINIC | Age: 30
End: 2020-09-28

## 2020-09-28 NOTE — TELEPHONE ENCOUNTER
E[facility] received a call from:    Name of Caller: Des Albert    Relationship to patient: Self    Best contact number: [phone]    Reason for call: Pt needs to schedule a therapy appt

## 2020-10-12 ENCOUNTER — VIRTUAL VISIT (OUTPATIENT)
Dept: PSYCHOLOGY | Age: 30
End: 2020-10-12
Payer: COMMERCIAL

## 2020-10-12 PROCEDURE — 90832 PSYTX W PT 30 MINUTES: CPT | Performed by: PSYCHOLOGIST

## 2020-10-12 NOTE — PROGRESS NOTES
TELEHEALTH VISIT -- Audio/Visual (During WLFSK-32 public health emergency)  }  Pursuant to the emergency declaration under the [address] [place] [address], [facility] waiver authority and the [employer] and [employer] Act, this Virtual Visit was conducted, with patient's consent, to reduce the patient's risk of exposure to COVID-19 and provide continuity of care for an established patient. Services were provided through a video synchronous discussion virtually to substitute for in-person clinic visit. Pt gave verbal informed consent to participate in telehealth services. Conducted a risk-benefit analysis and determined that the patient's presenting problems are consistent with the use of telepsychology. Determined that the patient has sufficient knowledge and skills in the use of technology enabling them to adequately benefit from telepsychology. It was determined that this patient was able to be properly treated without an in-person session. Patient verified that they were currently located at the [facility] address that was provided during registration or another [facility] address, if noted below. Verified the following information:  Patient's identification: Yes  Patient location: [address]  Patient's call back number: [phone]   Patient's emergency contact's name and number, as well as permission to contact them if needed: Extended Emergency Contact Information  Primary Emergency Contact: Blair Mckeon  Address: [address] [facility], [address] [place] [address] Phone: [phone]  Work Phone: [phone]  Relation: Parent  Secondary Emergency Contact: Dorita Baumann  Address: [address] [place] [address], [address] [place] [address] Phone: [phone]  Work Phone: [phone]  Relation: Parent     Provider location: [place]  Matthew Weldon, Ph.D.  Psychologist  10/12/2020  10:33 AM      Time spent with Patient: 28 minutes  This is patient's tenth  [facility] appointment. Reason for Consult:    Chief Complaint   Patient presents with    Anxiety       Feedback given to PCP. S:  Pt seen for f/u of anxiety. Pt reported \"pretty good\" mood and sxs. Recently moved to [place]. Pt was working in  for [employer], will be working in [employer]. Has degree in biology and is happy to be working in the field. Stated it is a healthy work environment. Is happy she is working night shift (2:30p-11p). Stated anxiety has been well-managed, but has noticed increased, intermittent irritability at repetitive noises such as people talking to her while working, tapping noises by coworkers. Has lost some weight by ordering smaller portion sizes when she eats out (1 cheeseburger instead of 3). Was able to lose about 20# w frequent hiking and adherence to Weight Watchers. Looks forward to walking around her new neighborhood. First night at new apartment had \"gruesome, terrible\" dreams about former roommate/partner. [place] he is getting promoted to working on the road as a  and pt is fearful of him pulling her over. Stated she has some violent imagery when she closes her eyes sometimes (blood, weapons, severed limbs, etc). Agreed we will discuss trauma further at next visit.      O:  MSE:    Appearance    alert, cooperative  Appetite normal  Sleep disturbance Yes  Fatigue No  Loss of pleasure No  Impulsive behavior No  Speech    spontaneous, normal rate, normal volume and well articulated  Mood    euthymic  Affect    normal affect  Thought Content    intact  Thought Process    goal directed and coherent  Associations    logical connections  Insight    Fair  Judgment    Intact  Orientation    oriented to person, place, time, and general circumstances  Memory    recent and remote memory intact  Attention/Concentration    intact  Morbid ideation No  Suicide Assessment    no suicidal ideation    History:  Social History:   Social History     Socioeconomic History    Marital status: Single     Spouse name: Not on file    Number of children: 0    Years of education: 15    Highest education level: Not on file   Occupational History    Occupation:  grad     Comment: biology    Occupation: [employer]      Comment: [address] resource strain: Not hard at all   [place] insecurity     Worry: Never true     Inability: Never true   [employer] needs     Medical: No     Non-medical: No   Tobacco Use    Smoking status: Former Smoker     Packs/day: 0.25     Years: 0.50     Pack years: 0.12     Types: Cigarettes     Start date: 2013     Last attempt to quit: 2013     Years since quittin.7    Smokeless tobacco: Never Used    Tobacco comment: Denies smoking at this time   Substance and Sexual Activity    Alcohol use: Yes     Comment: h/o weekend binges 5 or more drinks when out 1-2x/week;  :  cut most to all drinking out    Drug use: Yes     Types: Marijuana    Sexual activity: Not Currently     Partners: Male     Birth control/protection: Condom, OCP   Lifestyle    Physical activity     Days per week: Not on file     Minutes per session: Not on file    Stress: Not on file   Relationships    Social connections     Talks on phone: Not on file     Gets together: Not on file     Attends Anabaptism service: Not on file     Active member of club or organization: Not on file     Attends meetings of clubs or organizations: Not on file     Relationship status: Not on file    Intimate partner violence     Fear of current or ex partner: Not on file     Emotionally abused: Not on file     Physically abused: Not on file     Forced sexual activity: Not on file   Other Topics Concern    Not on file   Social History Narrative    Not on file     TOBACCO:   reports that she quit smoking about 6 years ago. Her smoking use included cigarettes. She started smoking about 6 years ago. She has a 0.13 pack-year smoking history. She has never used smokeless tobacco.  ETOH:   reports current alcohol use. Diagnosis:  1. Panic disorder    2. VERA (generalized anxiety disorder)    3. Attention deficit hyperactivity disorder (ADHD), predominantly inattentive type          Plan:  Pt interventions:  Discussed self-care (sleep, nutrition, rewarding activities, social support, exercise) and Discussed and problem-solved barriers in adhering to behavioral change plan, praised progress          Documentation was done using voice recognition dragon software. Every effort was made to ensure accuracy; however, inadvertent, unintentional computerized transcription errors may be present.

## 2020-10-20 ENCOUNTER — HOSPITAL ENCOUNTER (EMERGENCY)
Age: 30
Discharge: HOME | End: 2020-10-20
Attending: EMERGENCY MEDICINE

## 2020-10-20 ENCOUNTER — VIRTUAL VISIT (OUTPATIENT)
Dept: INTERNAL MEDICINE CLINIC | Age: 30
End: 2020-10-20
Payer: COMMERCIAL

## 2020-10-20 ENCOUNTER — APPOINTMENT (OUTPATIENT)
Dept: CT IMAGING | Age: 30
End: 2020-10-20

## 2020-10-20 VITALS
TEMPERATURE: 97.9 F | DIASTOLIC BLOOD PRESSURE: 80 MMHG | RESPIRATION RATE: 16 BRPM | BODY MASS INDEX: 33.04 KG/M2 | SYSTOLIC BLOOD PRESSURE: 132 MMHG | HEIGHT: 68 IN | HEART RATE: 69 BPM | WEIGHT: 218.03 LBS | OXYGEN SATURATION: 98 %

## 2020-10-20 LAB
A/G RATIO: 1.4 (ref 1.1–2.2)
ALBUMIN SERPL-MCNC: 4.4 G/DL (ref 3.4–5)
ALP BLD-CCNC: 65 U/L (ref 40–129)
ALT SERPL-CCNC: 10 U/L (ref 10–40)
ANION GAP SERPL CALCULATED.3IONS-SCNC: 12 MMOL/L (ref 3–16)
AST SERPL-CCNC: 11 U/L (ref 15–37)
BACTERIA: ABNORMAL /HPF
BASOPHILS ABSOLUTE: 0 K/UL (ref 0–0.2)
BASOPHILS RELATIVE PERCENT: 0.7 %
BILIRUB SERPL-MCNC: 0.4 MG/DL (ref 0–1)
BILIRUBIN URINE: NEGATIVE
BLOOD, URINE: ABNORMAL
BUN BLDV-MCNC: 15 MG/DL (ref 7–20)
CALCIUM SERPL-MCNC: 8.9 MG/DL (ref 8.3–10.6)
CHLORIDE BLD-SCNC: 101 MMOL/L (ref 99–110)
CLARITY: ABNORMAL
CO2: 22 MMOL/L (ref 21–32)
COLOR: YELLOW
CREAT SERPL-MCNC: 0.9 MG/DL (ref 0.6–1.1)
EOSINOPHILS ABSOLUTE: 0.1 K/UL (ref 0–0.6)
EOSINOPHILS RELATIVE PERCENT: 1 %
EPITHELIAL CELLS, UA: 21 /HPF (ref 0–5)
GFR AFRICAN AMERICAN: >60
GFR NON-AFRICAN AMERICAN: >60
GLOBULIN: 3.1 G/DL
GLUCOSE BLD-MCNC: 98 MG/DL (ref 70–99)
GLUCOSE URINE: NEGATIVE MG/DL
HCG QUALITATIVE: NEGATIVE
HCT VFR BLD CALC: 40.7 % (ref 36–48)
HEMOGLOBIN: 13.6 G/DL (ref 12–16)
KETONES, URINE: NEGATIVE MG/DL
LEUKOCYTE ESTERASE, URINE: ABNORMAL
LIPASE: 28 U/L (ref 13–60)
LYMPHOCYTES ABSOLUTE: 1.7 K/UL (ref 1–5.1)
LYMPHOCYTES RELATIVE PERCENT: 24 %
MCH RBC QN AUTO: 30.9 PG (ref 26–34)
MCHC RBC AUTO-ENTMCNC: 33.5 G/DL (ref 31–36)
MCV RBC AUTO: 92.3 FL (ref 80–100)
MICROSCOPIC EXAMINATION: YES
MONOCYTES ABSOLUTE: 0.6 K/UL (ref 0–1.3)
MONOCYTES RELATIVE PERCENT: 8.7 %
NEUTROPHILS ABSOLUTE: 4.5 K/UL (ref 1.7–7.7)
NEUTROPHILS RELATIVE PERCENT: 65.6 %
NITRITE, URINE: NEGATIVE
PDW BLD-RTO: 13.2 % (ref 12.4–15.4)
PH UA: 5.5 (ref 5–8)
PLATELET # BLD: 235 K/UL (ref 135–450)
PMV BLD AUTO: 7.5 FL (ref 5–10.5)
POTASSIUM REFLEX MAGNESIUM: 3.8 MMOL/L (ref 3.5–5.1)
PROTEIN UA: ABNORMAL MG/DL
RBC # BLD: 4.41 M/UL (ref 4–5.2)
RBC UA: 21 /HPF (ref 0–4)
SODIUM BLD-SCNC: 135 MMOL/L (ref 136–145)
SPECIFIC GRAVITY UA: 1.02 (ref 1–1.03)
TOTAL PROTEIN: 7.5 G/DL (ref 6.4–8.2)
URINE REFLEX TO CULTURE: YES
URINE TYPE: ABNORMAL
UROBILINOGEN, URINE: 0.2 E.U./DL
WBC # BLD: 6.9 K/UL (ref 4–11)
WBC UA: 82 /HPF (ref 0–5)

## 2020-10-20 PROCEDURE — 96375 TX/PRO/DX INJ NEW DRUG ADDON: CPT

## 2020-10-20 PROCEDURE — 80053 COMPREHEN METABOLIC PANEL: CPT

## 2020-10-20 PROCEDURE — 99284 EMERGENCY DEPT VISIT MOD MDM: CPT

## 2020-10-20 PROCEDURE — 74176 CT ABD & PELVIS W/O CONTRAST: CPT

## 2020-10-20 PROCEDURE — 87086 URINE CULTURE/COLONY COUNT: CPT

## 2020-10-20 PROCEDURE — 96374 THER/PROPH/DIAG INJ IV PUSH: CPT

## 2020-10-20 PROCEDURE — 6360000002 HC RX W HCPCS: Performed by: EMERGENCY MEDICINE

## 2020-10-20 PROCEDURE — 81001 URINALYSIS AUTO W/SCOPE: CPT

## 2020-10-20 PROCEDURE — 99214 OFFICE O/P EST MOD 30 MIN: CPT | Performed by: FAMILY MEDICINE

## 2020-10-20 PROCEDURE — 84703 CHORIONIC GONADOTROPIN ASSAY: CPT

## 2020-10-20 PROCEDURE — 85025 COMPLETE CBC W/AUTO DIFF WBC: CPT

## 2020-10-20 PROCEDURE — 83690 ASSAY OF LIPASE: CPT

## 2020-10-20 PROCEDURE — 6370000000 HC RX 637 (ALT 250 FOR IP): Performed by: EMERGENCY MEDICINE

## 2020-10-20 RX ORDER — ONDANSETRON 4 MG/1
4 TABLET, ORALLY DISINTEGRATING ORAL
Qty: 12 TABLET | Refills: 0 | Status: SHIPPED | OUTPATIENT
Start: 2020-10-20 | End: 2022-07-26 | Stop reason: SDUPTHER

## 2020-10-20 RX ORDER — KETOROLAC TROMETHAMINE 30 MG/ML
30 INJECTION, SOLUTION INTRAMUSCULAR; INTRAVENOUS ONCE
Status: COMPLETED | OUTPATIENT
Start: 2020-10-20 | End: 2020-10-20

## 2020-10-20 RX ORDER — DEXTROAMPHETAMINE SACCHARATE, AMPHETAMINE ASPARTATE MONOHYDRATE, DEXTROAMPHETAMINE SULFATE AND AMPHETAMINE SULFATE 5; 5; 5; 5 MG/1; MG/1; MG/1; MG/1
20 CAPSULE, EXTENDED RELEASE ORAL EVERY MORNING
Qty: 30 CAPSULE | Refills: 0 | Status: SHIPPED | OUTPATIENT
Start: 2020-12-19 | End: 2021-09-28

## 2020-10-20 RX ORDER — OXYCODONE HYDROCHLORIDE AND ACETAMINOPHEN 5; 325 MG/1; MG/1
1 TABLET ORAL EVERY 6 HOURS PRN
Qty: 12 TABLET | Refills: 0 | Status: SHIPPED | OUTPATIENT
Start: 2020-10-20 | End: 2020-10-23

## 2020-10-20 RX ORDER — LEVONORGESTREL AND ETHINYL ESTRADIOL 0.15-0.03
KIT ORAL
Qty: 91 TABLET | Refills: 3 | Status: SHIPPED | OUTPATIENT
Start: 2020-10-20 | End: 2021-01-13 | Stop reason: SDUPTHER

## 2020-10-20 RX ORDER — DEXTROAMPHETAMINE SACCHARATE, AMPHETAMINE ASPARTATE MONOHYDRATE, DEXTROAMPHETAMINE SULFATE AND AMPHETAMINE SULFATE 5; 5; 5; 5 MG/1; MG/1; MG/1; MG/1
20 CAPSULE, EXTENDED RELEASE ORAL EVERY MORNING
Qty: 30 CAPSULE | Refills: 0 | Status: SHIPPED | OUTPATIENT
Start: 2020-10-20 | End: 2021-01-13

## 2020-10-20 RX ORDER — OXYCODONE HYDROCHLORIDE AND ACETAMINOPHEN 5; 325 MG/1; MG/1
2 TABLET ORAL ONCE
Status: COMPLETED | OUTPATIENT
Start: 2020-10-20 | End: 2020-10-20

## 2020-10-20 RX ORDER — ONDANSETRON 2 MG/ML
4 INJECTION INTRAMUSCULAR; INTRAVENOUS ONCE
Status: COMPLETED | OUTPATIENT
Start: 2020-10-20 | End: 2020-10-20

## 2020-10-20 RX ORDER — DEXTROAMPHETAMINE SACCHARATE, AMPHETAMINE ASPARTATE MONOHYDRATE, DEXTROAMPHETAMINE SULFATE AND AMPHETAMINE SULFATE 5; 5; 5; 5 MG/1; MG/1; MG/1; MG/1
20 CAPSULE, EXTENDED RELEASE ORAL EVERY MORNING
Qty: 30 CAPSULE | Refills: 0 | Status: SHIPPED | OUTPATIENT
Start: 2020-11-19 | End: 2021-01-13

## 2020-10-20 RX ORDER — CEFUROXIME AXETIL 250 MG/1
250 TABLET ORAL 2 TIMES DAILY
Qty: 14 TABLET | Refills: 0 | Status: SHIPPED | OUTPATIENT
Start: 2020-10-20 | End: 2020-10-27

## 2020-10-20 RX ORDER — MORPHINE SULFATE 4 MG/ML
4 INJECTION, SOLUTION INTRAMUSCULAR; INTRAVENOUS ONCE
Status: COMPLETED | OUTPATIENT
Start: 2020-10-20 | End: 2020-10-20

## 2020-10-20 RX ORDER — ALPRAZOLAM 0.5 MG/1
TABLET ORAL
Qty: 20 TABLET | Refills: 2 | Status: SHIPPED | OUTPATIENT
Start: 2020-10-20 | End: 2020-11-05 | Stop reason: SDUPTHER

## 2020-10-20 RX ADMIN — ONDANSETRON 4 MG: 2 INJECTION INTRAMUSCULAR; INTRAVENOUS at 11:44

## 2020-10-20 RX ADMIN — MORPHINE SULFATE 4 MG: 4 INJECTION, SOLUTION INTRAMUSCULAR; INTRAVENOUS at 11:51

## 2020-10-20 RX ADMIN — OXYCODONE HYDROCHLORIDE AND ACETAMINOPHEN 2 TABLET: 5; 325 TABLET ORAL at 13:27

## 2020-10-20 RX ADMIN — KETOROLAC TROMETHAMINE 30 MG: 30 INJECTION, SOLUTION INTRAMUSCULAR at 11:44

## 2020-10-20 ASSESSMENT — PAIN SCALES - GENERAL
PAINLEVEL_OUTOF10: 7
PAINLEVEL_OUTOF10: 6
PAINLEVEL_OUTOF10: 9
PAINLEVEL_OUTOF10: 9
PAINLEVEL_OUTOF10: 6
PAINLEVEL_OUTOF10: 9

## 2020-10-20 ASSESSMENT — PAIN DESCRIPTION - DESCRIPTORS: DESCRIPTORS: STABBING

## 2020-10-20 ASSESSMENT — PAIN DESCRIPTION - ORIENTATION: ORIENTATION: RIGHT

## 2020-10-20 ASSESSMENT — PAIN DESCRIPTION - LOCATION: LOCATION: FLANK

## 2020-10-20 ASSESSMENT — ENCOUNTER SYMPTOMS
COUGH: 0
CHEST TIGHTNESS: 0
SHORTNESS OF BREATH: 0
WHEEZING: 0

## 2020-10-20 ASSESSMENT — PAIN DESCRIPTION - PAIN TYPE: TYPE: ACUTE PAIN

## 2020-10-20 NOTE — ED TRIAGE NOTES
Patient to ED via private car. right flank pain started this morning around 0900; hx of kidney stone. Admits frequency. Ad,its bladder twitching.

## 2020-10-20 NOTE — LETTER
[facility] Emergency Department  [address] [facility] [address]  Phone: [phone]               October 20, 2020    Patient: Jasper Ramierz   YOB: 1990   Date of Visit: 10/20/2020       To Whom It May Concern:    Jasper Ramirez was seen and treated in our emergency department on 10/20/2020. She may return to work on 10/22/2020.       Sincerely,           Signature:__________________________________

## 2020-10-20 NOTE — PROGRESS NOTES
Subjective:      Patient ID: Jennifer Pool is a 34 y.o. female. TELEHEALTH EVALUATION -- Audio/Visual (During LWLIE-41 public health emergency)    Due to this being a TeleHealth encounter, examination is limited}  vis  Pursuant to the emergency declaration under the [facility], [facility] waiver authority and the [employer] and [employer] Act, this Virtual  Visit was conducted, with patient's consent, to reduce the patient's risk of exposure to COVID-19 and provide continuity of care for an established patient. Services were provided through a video synchronous discussion virtually to substitute for in-person clinic visit. Secure platform [employer]. me used for the [employer]. Services provided to the patient at his/her home with the provider physically  at her home or her office. Home vital signs if applicable:  recorded in chart and noted. All home /reported readings    (I reviewed Med list, allergies, smoking, etoh, drug history)    TIME OF VIRTUAL VISIT:  25 min      HPI   Chief Complaint   Patient presents with    Medication Check     FU of amphetamine use for ADD, BZD for panic disorder and migraines. Going good at work. [place] Lots of changes. This has increased her anxiety and more panic attacks but making it through. Hopefully will be more stable now. New job at work. Promotion. Things are going well with her Adderall 20 mg. Takes only on work days and off on weekends. No side effects and tolerated and helpful. Using Xanax and propranolol for panic attacks. Is seeing DR. Francine Bland for therapy. Migraines have been better and more stable. Still gets them at the time of menses and also when a storm system is coming in but not having them nearly as often and the sumatriptan works when she has one. Thinks she may have a kidney stone. Has some flank pain.   It is moderate and feels it may be severe. She is not sure if a stone or if a muscle issue. Not sure how to tell. Will have her mother take her to ER if severe. 90 day follow up for controlled substance(s)  Xanax 0.5 mg (20 per montth) and Adderall ER   OD risk:  220  Lorazepam Equiv dose (if applicable)  3.03 mg with last refill 10/7/20 and prior 9/8/20. Adderall ER LR for 15 mg dose was 8/13/2020  Using medication for:  Panic disorder and ADD  Functional improvement noted compared to before taking this controlled medication:  Able to work full time and not feel like she will have a heart attack with her panic disorder. Adverse effects:  none    Aberrant behavior: none        See Assessment for more detail on conditions addressed today. Patient Active Problem List   Diagnosis    Migraine without aura and without status migrainosus, not intractable    ADHD (attention deficit hyperactivity disorder)    Allergic rhinitis    Kidney stone    Hypersomnia    VERA (generalized anxiety disorder)    Oral contraceptive pill surveillance    GERD (gastroesophageal reflux disease)    Cervico-occipital neuralgia    Chronic use of benzodiazepine for therapeutic purpose    Obstructive sleep apnea (adult) (pediatric)    Irritable bowel syndrome with diarrhea    Panic disorder         Outpatient Medications Marked as Taking for the 10/20/20 encounter (Virtual Visit) with Lei Sharif MD   Medication Sig Dispense Refill    amphetamine-dextroamphetamine (ADDERALL XR) 20 MG extended release capsule Take 1 capsule by mouth every morning for 30 days. 30 capsule 0    Fremanezumab-vfrm (AJOVY) 225 MG/1.5ML SOAJ Inject 225 mg into the skin every 30 days 1.5 mL 2    escitalopram (LEXAPRO) 20 MG tablet Take 1 tablet by mouth daily TAKE 1 TABLET BY MOUTH EVERY DAY 30 tablet 5    fluticasone (FLONASE) 50 MCG/ACT nasal spray 1-2 sprays each nostril daily. Must be used regularly to help.  1 Bottle 12    levonorgestrel-ethinyl estradiol (INTROVALE) 0.15-0.03 MG per tablet TAKE 1 TABLET BY MOUTH DAILY 91 tablet 3    topiramate (TOPAMAX) 25 MG tablet Take 1 tablet by mouth nightly 30 tablet 5    omeprazole (PRILOSEC) 20 MG delayed release capsule Take 1 capsule by mouth daily as needed (for flare up. take until well and then stop for a while) 30 capsule 3    clindamycin-benzoyl peroxide (BENZACLIN) 1-5 % gel APPLY EXTERNALLY TO THE AFFECTED AREA TWICE DAILY 150 g 3       Social History     Tobacco Use    Smoking status: Former Smoker     Packs/day: 0.25     Years: 0.50     Pack years: 0.12     Types: Cigarettes     Start date: 2013     Last attempt to quit: 2013     Years since quittin.8    Smokeless tobacco: Never Used    Tobacco comment: Denies smoking at this time   Substance Use Topics    Alcohol use: Yes     Comment: h/o weekend binges 5 or more drinks when out 1-2x/week;  :  cut most to all drinking out    Drug use: Yes     Types: Marijuana         Review of Systems   Constitutional: Negative for unexpected weight change. Respiratory: Negative for cough, chest tightness, shortness of breath and wheezing. Cardiovascular: Negative for chest pain, palpitations and leg swelling. Skin: Negative for rash and wound. Neurological: Positive for headaches. Negative for dizziness, syncope, facial asymmetry, speech difficulty, weakness and numbness. Psychiatric/Behavioral: Negative for dysphoric mood. The patient is nervous/anxious. Objective:   Physical Exam  Constitutional:       Appearance: Normal appearance. Eyes:      General: No scleral icterus. Pulmonary:      Effort: Pulmonary effort is normal. No respiratory distress. Skin:     Coloration: Skin is not pale. Neurological:      General: No focal deficit present. Mental Status: She is alert and oriented to person, place, and time.    Psychiatric:         Mood and Affect: Mood normal.         Behavior: Behavior normal.       Last HOUR IF NOT RESOLVED  Dispense: 20 tablet; Refill: 2          Plan:      See orders. See after visit summary, patient instructions, and reference hand-outs. A PRINTED SUMMARY = AVS GIVEN TO THE PATIENT, (or if Virtual Visit, patient told it is available in My Chart or will be mailed if not active on My Chart.)    Discussed use, benefit, and side effects of prescribed medications. Barriers to medication compliance addressed. All patient questions answered.           Lanette Aly MD

## 2020-10-20 NOTE — ED PROVIDER NOTES
[facility]  eMERGENCY dEPARTMENT eNCOUnter      Pt Name: Moreno Sampson  MRN: [id number]  [place] 1990  Date of evaluation: 10/20/2020  Provider: Emma Ca MD    [address] [place] [address]       Chief Complaint   Patient presents with    Flank Pain     right flank pain started this morning around 0900; hx of kidney stone         CRITICAL CARE TIME   Total Critical Care time was 0 minutes, excluding separately reportable procedures. There was a high probability of clinically significant/life threatening deterioration in the patient's condition which required my urgent intervention. HISTORY OF PRESENT ILLNESS  (Location/Symptom, Timing/Onset, Context/Setting, Quality, Duration, Modifying Factors, Severity.)   Moreno Sampson is a 34 y.o. female who presents to the emergency department complaining of right flank pain sudden onset that started around 9 AM this morning. She has nausea but no vomiting. She has a sense of urgency but no dysuria. No change in the color of her urine. She is had previous kidney stones, the pain feels similar. Nursing Notes were reviewed and I agree. REVIEW OF SYSTEMS    (2-9 systems for level 4, 10 or more for level 5)     General: No fever or chills. Cardiovascular: No chest pain. Pulmonary: No shortness of breath. GI: Right flank pain radiating into right lower abdomen. Nausea but no vomiting. : Flank pain as above. Urgency. No dysuria. She states her menses have been irregular, she is spotting a little bit at this time. She states she is not been taking her birth control pills regularly. No other vaginal discharge. Except as noted above the remainder of the review of systems was reviewed and negative.        PAST MEDICAL HISTORY     Past Medical History:   Diagnosis Date    Acid reflux     ADHD (attention deficit hyperactivity disorder)     started medication 6th grade    Allergic rhinitis probably pollen only    Anxiety     Depression     GERD (gastroesophageal reflux disease)     Hypersomnia     Irritable bowel syndrome     Kidney stone 4/24/2014    Migraine headache     Sinus infection     frequent sinus infections         SURGICAL HISTORY       Past Surgical History:   Procedure Laterality Date    TONSILLECTOMY  approx age 13         CURRENT MEDICATIONS       Previous Medications    ALPRAZOLAM (XANAX) 0.5 MG TABLET    TAKE 1 TABLET BY MOUTH AT ONSET OF PANIC ATTACK AND REPEAT SECOND DOSE IN ONE HOUR IF NOT RESOLVED    AMPHETAMINE-DEXTROAMPHETAMINE (ADDERALL XR) 20 MG EXTENDED RELEASE CAPSULE    Take 1 capsule by mouth every morning for 30 days. AMPHETAMINE-DEXTROAMPHETAMINE (ADDERALL XR) 20 MG EXTENDED RELEASE CAPSULE    Take 1 capsule by mouth every morning for 30 days. AMPHETAMINE-DEXTROAMPHETAMINE (ADDERALL XR) 20 MG EXTENDED RELEASE CAPSULE    Take 1 capsule by mouth every morning for 30 days. BUTALBITAL-ACETAMINOPHEN-CAFFEINE (FIORICET, ESGIC) -40 MG PER TABLET    TAKE ONE TABLET BY MOUTH EVERY 4 HOURS AS NEEDED FOR HEADACHES; LIMIT USE TO 2 DAYS PER WEEK **DO NOT USE DAILY**    CETIRIZINE (ZYRTEC) 10 MG TABLET    Take 1 tablet by mouth daily Works best to take at bedtime. CLINDAMYCIN-BENZOYL PEROXIDE (BENZACLIN) 1-5 % GEL    APPLY EXTERNALLY TO THE AFFECTED AREA TWICE DAILY    DICLOFENAC (CATAFLAM) 50 MG TABLET    Take 1 tablet by mouth 3 times daily as needed (headache) Limit to 10 days a month    ESCITALOPRAM (LEXAPRO) 20 MG TABLET    Take 1 tablet by mouth daily TAKE 1 TABLET BY MOUTH EVERY DAY    FLUTICASONE (FLONASE) 50 MCG/ACT NASAL SPRAY    1-2 sprays each nostril daily. Must be used regularly to help.     FREMANEZUMAB-VFRM (AJOVY) 225 MG/1.5ML SOAJ    Inject 225 mg into the skin every 30 days    HYDROXYZINE (VISTARIL) 25 MG CAPSULE    25 mg at bedtime for 3 nights to break the cycle of headache then PRN    LEVONORGESTREL-ETHINYL ESTRADIOL (INTROVALE) 0. 15-0.03 MG PER TABLET    TAKE 1 TABLET BY MOUTH DAILY    METOCLOPRAMIDE (REGLAN) 10 MG TABLET    1/2 - 1 tab at beginning of meals and if needed 1 at bedtime. Do not use more than 3 weeks in a row without breaking for a week. NAPROXEN SODIUM (ANAPROX) 550 MG TABLET    Take one at onset of migraine or musculoskeletal pain and repeat in 6 hours or more but maximum of 2 doses per day. OMEPRAZOLE (PRILOSEC) 20 MG DELAYED RELEASE CAPSULE    Take 1 capsule by mouth daily as needed (for flare up. take until well and then stop for a while)    ONDANSETRON (ZOFRAN) 4 MG TABLET    Take 1 tablet by mouth every 8 hours as needed for Nausea or Vomiting    PREDNISONE (DELTASONE) 20 MG TABLET    Take 2 tablets daily for unrelenting migraine; Take with breakfast or lunch. PROCHLORPERAZINE (COMPAZINE) 10 MG TABLET    Take 1 tablet by mouth every 8 hours as needed (migraine headache or nausea)    PROPRANOLOL (INDERAL) 10 MG TABLET    TAKE 1 TABLET BY MOUTH THREE TIMES DAILY AS NEEDED(ANXIETY, PANIC ATTACK)    SUMATRIPTAN (IMITREX) 100 MG TABLET    TAKE ONE TABLET BY MOUTH AT ONSET OF HEADACHE; MAY REPEAT ONE TABLET IN 2 HOURS IF NEEDED. MAX OF 2 PER DAY    TOPIRAMATE (TOPAMAX) 25 MG TABLET    Take 1 tablet by mouth nightly       ALLERGIES     Bactrim [sulfamethoxazole-trimethoprim];  Reglan [metoclopramide]; and Sulfa antibiotics    FAMILY HISTORY       Family History   Problem Relation Age of Onset    Arthritis Mother     High Blood Pressure Mother     Anxiety Disorder Mother    [facility] Migraines Mother     Depression Mother     Sleep Apnea Mother     Migraines Father     Cancer Maternal Grandfather         skin; not melanoma    Stroke Paternal Grandmother     Heart Disease Paternal Grandfather     Heart Disease Other     High Cholesterol Other     Cancer Other     Stroke Other     Migraines Other     Other Neg Hx         kidney stones          SOCIAL HISTORY       Social History     Socioeconomic History    Marital status: Single     Spouse name: None    Number of children: 0    Years of education: 15    Highest education level: None   Occupational History    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    Occupation: [employer]      Comment: [address] Financial resource strain: Not hard at all   [place] insecurity     Worry: Never true     Inability: Never true   [employer] needs     Medical: No     Non-medical: No   Tobacco Use    Smoking status: Former Smoker     Packs/day: 0.25     Years: 0.50     Pack years: 0.12     Types: Cigarettes     Start date: 2013     Last attempt to quit: 2013     Years since quittin.8    Smokeless tobacco: Never Used    Tobacco comment: Denies smoking at this time   Substance and Sexual Activity    Alcohol use: Yes     Comment: h/o weekend binges 5 or more drinks when out 1-2x/week;  :  cut most to all drinking out    Drug use: Yes     Types: Marijuana    Sexual activity: Not Currently     Partners: Male     Birth control/protection: Condom, OCP   Lifestyle    Physical activity     Days per week: None     Minutes per session: None    Stress: None   Relationships    Social connections     Talks on phone: None     Gets together: None     Attends Episcopalian service: None     Active member of club or organization: None     Attends meetings of clubs or organizations: None     Relationship status: None    Intimate partner violence     Fear of current or ex partner: None     Emotionally abused: None     Physically abused: None     Forced sexual activity: None   Other Topics Concern    None   Social History Narrative    None         PHYSICAL EXAM    (up to 7 for level 4, 8 or more for level 5)     ED Triage Vitals [10/20/20 1122]   BP Temp Temp Source Pulse Resp SpO2 Height Weight   (!) 154/81 97.9 °F (36.6 °C) Oral 75 16 95 % 5' 8\" (1.727 m) 218 lb 0.6 oz (98.9 kg)       General: Alert moderately obese white female no acute distress. Head: Atraumatic and normocephalic. Eyes: No conjunctival injection. No pallor. Pupils equal round reactive. ENT: [place] is clear. Oropharynx is moist without erythema. Neck: Supple without adenopathy, nontender. Heart: Regular rate and rhythm. No murmurs or gallops noted. Lungs: Breath sounds equal bilaterally and clear. Abdomen: Obese, soft, nontender. No masses organomegaly. Bowel sounds are normal.  No flank tenderness. Musculoskeletal: No lower extremity edema. Intact symmetrical distal pulses in the upper and lower extremities. Skin: Warm and dry, good turgor. No pallor or cyanosis. No diaphoresis. Neuro: Awake, alert, oriented. Symmetrical reactive pupils. Intact extraocular movements. No facial asymmetry. Symmetrical motor function. Normal gait. Mental status: Anxious. DIFFERENTIAL DIAGNOSIS   Differential includes but is not limited to musculoskeletal back pain, ureterolithiasis, pyelonephritis, biliary colic, cholecystitis, pancreatitis, appendicitis, ectopic pregnancy, ovarian torsion, ovarian cyst.      DIAGNOSTIC RESULTS     EKG: All EKG's are interpreted by Wilfrido Brown MD in the absence of a cardiologist.        RADIOLOGY:   Non-plain film images such as CT, Ultrasound and MRI are read by the radiologist. Plain radiographic images are visualized and preliminarily interpreted Wilfrido Brown MD with the below findings:        Interpretation per the Radiologist below, if available at the time of this note:    CT ABDOMEN PELVIS WO CONTRAST Additional Contrast? None   Final Result   3 mm distal right ureteral stone very close to the UVJ. This causes mild   hydronephrosis.                ED BEDSIDE ULTRASOUND:   Performed by ED Physician - none    LABS:  Labs Reviewed   COMPREHENSIVE METABOLIC PANEL W/ REFLEX TO MG FOR LOW K - Abnormal; Notable for the following components:       Result Value    Sodium 135 (*)     AST 11 (*)     All other shows a 3 mm distal right ureteral stone close to the UVJ. Her urine did show 82 white cells and 21 red cells, but there was 21 epithelial cells, likely a contaminated specimen. She is not febrile. Her white blood cell count not elevated. I do not think she is septic. I am going to empirically cover her with Ceftin pending her culture result. Her pain and nausea are controlled here. She will be discharged on Percocet and Zofran ODT. She is given urology referral for follow-up. She was instructed to return for uncontrolled pain, uncontrolled vomiting, fever, unable to urinate. She was instructed to call urology follow-up for further treatment. Test results, diagnosis, and treatment plan were discussed with the patient. She understands the treatment plan and follow-up as discussed. Controlled Substance Monitoring:    Acute and Chronic Pain Monitoring:   RX Monitoring 10/20/2020   Attestation -   Periodic Controlled Substance Monitoring Possible medication side effects, risk of tolerance/dependence & alternative treatments discussed. ;No signs of potential drug abuse or diversion identified. CONSULTS:  None    PROCEDURES:  None    FINAL IMPRESSION      1. Right ureteral stone          DISPOSITION/PLAN   DISPOSITION Decision To Discharge 10/20/2020 01:42:14 PM      PATIENT REFERRED TO:  Pablo Rojas MD  [address] [place] [address]  [phone]    In 3 days        DISCHARGE MEDICATIONS:  New Prescriptions    CEFUROXIME (CEFTIN) 250 MG TABLET    Take 1 tablet by mouth 2 times daily for 7 days    ONDANSETRON (ZOFRAN ODT) 4 MG DISINTEGRATING TABLET    Take 1 tablet by mouth every 4-6 hours as needed for Nausea    OXYCODONE-ACETAMINOPHEN (PERCOCET) 5-325 MG PER TABLET    Take 1 tablet by mouth every 6 hours as needed for Pain for up to 3 days. Intended supply: 3 days.  Take lowest dose possible to manage pain       (Please note that portions of this note were completed with a voice recognition program.  Efforts were made to edit the dictations but occasionally words are mis-transcribed.)    Cierra Howell MD  Attending Emergency Physician        Litzy Gordon MD  10/20/20 1383

## 2020-10-21 LAB — URINE CULTURE, ROUTINE: NORMAL

## 2020-11-04 ENCOUNTER — VIRTUAL VISIT (OUTPATIENT)
Dept: PSYCHOLOGY | Age: 30
End: 2020-11-04

## 2020-11-04 PROCEDURE — 90832 PSYTX W PT 30 MINUTES: CPT | Performed by: PSYCHOLOGIST

## 2020-11-04 NOTE — PROGRESS NOTES
Shashi Meehan, Ph.D.  Psychologist  11/4/2020  1:34 PM      Time spent with Patient: 30 minutes  This is patient's 11th  [facility] appointment. Reason for Consult:    Chief Complaint   Patient presents with    Anxiety       Feedback given to PCP. S:  Pt seen for f/u of anxiety. Pt reported stable mood and sxs. Last week was having increased panic attacks and severity w dizziness and vision disturbances. This has mostly resolved and may be indicative of panic or may be r/t other health concern. Will f/u if sxs return. Stated she continues to feel anxious frequently. Noted \"impulse control issues,\" such as shopping beyond her budget (bought a jacket she didn't need, will order snacks for delivery), over sharing w others/talking too much, feeling \"obsessive\" over a crush on a coworker. Ruled out manic sxs: reported fatigue, no decreased need for sleep, no more racing thoughts than usual, no grandiosity. As such, unlikely r/t a manic episode. Discussed ADHD possibly being exacerbated by increased anxiety. Noted work stress and adjusting to move have contributed to heightened anxiety. Discussed adaptive coping skills.          O:  MSE:    Appearance    alert, cooperative  Appetite normal  Sleep disturbance No  Fatigue Yes  Loss of pleasure No  Impulsive behavior Yes  Speech    spontaneous, normal rate, normal volume and well articulated  Mood    Anxious  Affect    normal affect  Thought Content    intact and cognitive distortions  Thought Process    goal directed and coherent  Associations    logical connections  Insight    Fair  Judgment    Intact  Orientation    oriented to person, place, time, and general circumstances  Memory    recent and remote memory intact  Attention/Concentration    impaired  Morbid ideation No  Suicide Assessment    no suicidal ideation    History:  Social History:   Social History     Socioeconomic History    Marital status: Single     Spouse name: Not on file    Number of children: 0    Years of education: 15    Highest education level: Not on file   Occupational History    Occupation:  grad     Comment: biology    Occupation: [employer]      Comment: [address] Financial resource strain: Not hard at all   [place]-[employer] insecurity     Worry: Never true     Inability: Never true   [employer] needs     Medical: No     Non-medical: No   Tobacco Use    Smoking status: Former Smoker     Packs/day: 0.25     Years: 0.50     Pack years: 0.12     Types: Cigarettes     Start date: 2013     Last attempt to quit: 2013     Years since quittin.8    Smokeless tobacco: Never Used    Tobacco comment: Denies smoking at this time   Substance and Sexual Activity    Alcohol use: Yes     Comment: h/o weekend binges 5 or more drinks when out 1-2x/week;  :  cut most to all drinking out    Drug use: Yes     Types: Marijuana    Sexual activity: Not Currently     Partners: Male     Birth control/protection: Condom, OCP   Lifestyle    Physical activity     Days per week: Not on file     Minutes per session: Not on file    Stress: Not on file   Relationships    Social connections     Talks on phone: Not on file     Gets together: Not on file     Attends Evangelical service: Not on file     Active member of club or organization: Not on file     Attends meetings of clubs or organizations: Not on file     Relationship status: Not on file    Intimate partner violence     Fear of current or ex partner: Not on file     Emotionally abused: Not on file     Physically abused: Not on file     Forced sexual activity: Not on file   Other Topics Concern    Not on file   Social History Narrative    Not on file     TOBACCO:   reports that she quit smoking about 6 years ago. Her smoking use included cigarettes. She started smoking about 7 years ago. She has a 0.13 pack-year smoking history.  She has never used smokeless tobacco.  ETOH:   reports current alcohol use.      Diagnosis:  1. VERA (generalized anxiety disorder)    2. Attention deficit hyperactivity disorder (ADHD), predominantly inattentive type          Plan:  Pt interventions:  Trained in strategies for increasing balanced thinking, Discussed self-care (sleep, nutrition, rewarding activities, social support, exercise) and Provided Psychoeducation re: ADHD and anxiety, discussed adaptive coping        Documentation was done using voice recognition dragon software. Every effort was made to ensure accuracy; however, inadvertent, unintentional computerized transcription errors may be present.

## 2020-11-05 ENCOUNTER — PATIENT MESSAGE (OUTPATIENT)
Dept: PSYCHOLOGY | Age: 30
End: 2020-11-05

## 2020-11-05 ENCOUNTER — PATIENT MESSAGE (OUTPATIENT)
Dept: INTERNAL MEDICINE CLINIC | Age: 30
End: 2020-11-05

## 2020-11-05 RX ORDER — PROPRANOLOL HCL 60 MG
60 CAPSULE, EXTENDED RELEASE 24HR ORAL DAILY
Qty: 30 CAPSULE | Refills: 3 | Status: SHIPPED
Start: 2020-11-05 | End: 2021-04-19 | Stop reason: SINTOL

## 2020-11-05 RX ORDER — ALPRAZOLAM 0.5 MG/1
TABLET ORAL
Qty: 20 TABLET | Refills: 0 | Status: SHIPPED | OUTPATIENT
Start: 2020-11-05 | End: 2021-01-13 | Stop reason: SDUPTHER

## 2020-11-05 NOTE — TELEPHONE ENCOUNTER
From: Arelis Grant  To: Ermelinda Hameed, PhD  Sent: 11/5/2020 12:53 PM EST  Subject: Non-Urgent Medical Question    Hi,  So the dizzy/ eye darkening keeps happening again and Im out of medicine at least until tomorrow and I just want to get through the next two days until Im off for the weekend. Also I need to find out how to manage this if its going to keep happening since I cant take my Xanax every single time it happens. Its really scary and its making me just want to stay home and not go anywhere, even work because I am so uncomfortable. I feel like I have no control. Please get back to me when you can.     Thanks,  Yakov Baptiste

## 2020-11-06 NOTE — TELEPHONE ENCOUNTER
From: Alexandra Small  To: Shawn Espitia MD  Sent: 11/5/2020 10:38 PM EST  Subject: Non-Urgent Medical Question    I will try to  the propranolol tomorrow before work, thank you. I cant tell if the dizziness is all in my head when it happens or if it could possibly be from migraine. Also Cara never heard of rebound anxiety, I had no idea that was something that could happen. I will definitely be more careful about when I take Xanax. I had just been having a rough month but hopefully things will calm down soon as I get into a new work schedule next week. Thank you and also to dr Misa Arteaga for working together to help me so quickly.

## 2020-11-11 ENCOUNTER — PATIENT MESSAGE (OUTPATIENT)
Dept: INTERNAL MEDICINE CLINIC | Age: 30
End: 2020-11-11

## 2020-11-12 NOTE — TELEPHONE ENCOUNTER
From: Kiara Jamison  To: Mason Casanova MD  Sent: 11/11/2020 8:26 PM EST  Subject: Non-Urgent Medical Question    Hello again,    I think I may have an idea, though I dont think the dizziness/ anxiety is a common symptom. Also those symptoms havent happened so far this week which is good! But I think I may be experiencing seasonal depression? If I think of when this all started it was about a week before Halloween. And then even more noticeably the last two weeks I have been extremely tired, more than usual like I could sleep for years. And then a little bit last week and even more so this week I have been feeling really down and sad. I havent started propranolol extended yet bc I wanted to wait for an off day. So now what Im wondering is would it be okay for me to start taking 5mg lexapro again temporarily for the winter months? ? What do you think?

## 2020-11-16 RX ORDER — METOCLOPRAMIDE 10 MG/1
TABLET ORAL
Qty: 90 TABLET | Refills: 0 | Status: SHIPPED | OUTPATIENT
Start: 2020-11-16 | End: 2021-09-28 | Stop reason: SDUPTHER

## 2020-11-16 RX ORDER — ESCITALOPRAM OXALATE 5 MG/1
5 TABLET ORAL DAILY
Qty: 30 TABLET | Refills: 1 | Status: SHIPPED | OUTPATIENT
Start: 2020-11-16 | End: 2021-03-16 | Stop reason: SDUPTHER

## 2020-11-23 ENCOUNTER — VIRTUAL VISIT (OUTPATIENT)
Dept: PSYCHOLOGY | Age: 30
End: 2020-11-23

## 2020-11-23 PROCEDURE — 98968 PH1 ASSMT&MGMT NQHP 21-30: CPT | Performed by: PSYCHOLOGIST

## 2020-11-23 NOTE — PROGRESS NOTES
TELEHEALTH VISIT -- Audio Only (During IQISJ-09 public health emergency)  }  Pursuant to the emergency declaration under the [address] [place] [address], [facility] authority and the [employer] Act, this phone call was conducted, with patient's consent, to reduce the patient's risk of exposure to COVID-19 and provide continuity of care for an established patient. Services were provided through a phone call discussion to substitute for in-person clinic visit. Pt gave verbal informed consent to participate in telehealth services. Consent:  She and/or health care decision maker is aware that that she may receive a bill for this telephone service, depending on her insurance coverage, and has provided verbal consent to proceed: NA - Consent obtained within past 12 months    Conducted a risk-benefit analysis and determined that the patient's presenting problems are consistent with the use of telepsychology. Determined that the patient has sufficient knowledge and skills in the use of technology enabling them to adequately benefit from telepsychology. It was determined that this patient was able to be properly treated without an in-person session. Patient verified that they were currently located at the [facility] address that was provided during registration.     Verified the following information:  Patient's identification: Yes  Patient location: [address] [place] [address] [place]   Patient's call back number: [phone]   Patient's emergency contact's name and number, as well as permission to contact them if needed: Extended Emergency Contact Information  Primary Emergency Contact: Blair Mckeon  Address: [address] [place] [address], [address] [place] [address] Lois [address] Phone: [phone]  Work Phone: [phone]  Relation: Parent  Secondary Emergency Contact: Dorita Baumann  Address: [address] [place] [address] [place] [address] Phone: [phone]  Work Phone: [phone]  Relation: Parent     Provider location: [facility], [place] affirm this is an episode with a patient who has not had a related appointment within my department in the past 7 days or scheduled within the next 24 hours. Provider location: [place], [address] Consultation  Darien Morel, Ph.D.  Psychologist  11/23/2020  10:08 AM      Time spent with Patient: 28 minutes  This is patient's 12th  [address] [facility] appointment. Reason for Consult:    Chief Complaint   Patient presents with    Anxiety       Feedback given to PCP. S:  Pt seen for f/u of anxiety, panic, and ADHD. Attempted to conduct visit via doxy. me, but experienced technical issues and conducted visit via telephone. Pt reported somewhat improving mood and sxs, but had a period of increased stress and evidence of seasonal affective disorder. Noted sxs did improve upon learning she will be hired permanently at job w [employer] and health insurance. Since then, persistent dizziness has resolved and depressive sxs have improved. Continues to experience fatigue, but acknowledges this is at least in part d/t work hrs. Has set up kelse[employer] dating acct.       O:  MSE:    Appearance    alert, cooperative  Appetite normal  Sleep disturbance Yes  Fatigue Yes  Loss of pleasure No  Impulsive behavior No  Speech    spontaneous, normal rate, normal volume and well articulated  Mood    euthymic  Affect    normal affect  Thought Content    intact  Thought Process    goal directed and coherent  Associations    logical connections  Insight    Fair  Judgment    Intact  Orientation    oriented to person, place, time, and general circumstances  Memory    recent and remote memory intact  Attention/Concentration    intact  Morbid ideation No  Suicide Assessment    no suicidal ideation    History:  Social History:   Social History     Socioeconomic History    Marital status: Single     Spouse name: Not on file    Number of children: 0    Years of education: 15    Highest education level: Not on file   Occupational History    Occupation:  grad     Comment: biology    Occupation: [employer]      Comment: [address] Financial resource strain: Not hard at all   [place]-Fay insecurity     Worry: Never true     Inability: Never true   [employer] needs     Medical: No     Non-medical: No   Tobacco Use    Smoking status: Former Smoker     Packs/day: 0.25     Years: 0.50     Pack years: 0.12     Types: Cigarettes     Start date: 2013     Last attempt to quit: 2013     Years since quittin.9    Smokeless tobacco: Never Used    Tobacco comment: Denies smoking at this time   Substance and Sexual Activity    Alcohol use: Yes     Comment: h/o weekend binges 5 or more drinks when out 1-2x/week;  :  cut most to all drinking out    Drug use: Yes     Types: Marijuana    Sexual activity: Not Currently     Partners: Male     Birth control/protection: Condom, OCP   Lifestyle    Physical activity     Days per week: Not on file     Minutes per session: Not on file    Stress: Not on file   Relationships    Social connections     Talks on phone: Not on file     Gets together: Not on file     Attends Religion service: Not on file     Active member of club or organization: Not on file     Attends meetings of clubs or organizations: Not on file     Relationship status: Not on file    Intimate partner violence     Fear of current or ex partner: Not on file     Emotionally abused: Not on file     Physically abused: Not on file     Forced sexual activity: Not on file   Other Topics Concern    Not on file   Social History Narrative    Not on file     TOBACCO:   reports that she quit smoking about 6 years ago. Her smoking use included cigarettes. She started smoking about 7 years ago. She has a 0.13 pack-year smoking history.  She has never used smokeless tobacco.  ETOH:   reports current alcohol use. Diagnosis:  1. VERA (generalized anxiety disorder)    2. Attention deficit hyperactivity disorder (ADHD), predominantly inattentive type          Plan:  Pt interventions:  Discussed self-care (sleep, nutrition, rewarding activities, social support, exercise) and Supportive techniques        Documentation was done using voice recognition dragon software. Every effort was made to ensure accuracy; however, inadvertent, unintentional computerized transcription errors may be present.

## 2020-12-21 ENCOUNTER — VIRTUAL VISIT (OUTPATIENT)
Dept: PSYCHOLOGY | Age: 30
End: 2020-12-21

## 2020-12-21 DIAGNOSIS — F90.0 ATTENTION DEFICIT HYPERACTIVITY DISORDER (ADHD), PREDOMINANTLY INATTENTIVE TYPE: ICD-10-CM

## 2020-12-21 DIAGNOSIS — F41.1 GAD (GENERALIZED ANXIETY DISORDER): Primary | ICD-10-CM

## 2020-12-21 PROCEDURE — 90832 PSYTX W PT 30 MINUTES: CPT | Performed by: PSYCHOLOGIST

## 2020-12-21 NOTE — PROGRESS NOTES
TELEHEALTH VISIT -- Audio/Visual (During WXRNO-33 public health emergency)  }  Pursuant to the emergency declaration under the [address] [place] [address], [facility] access hospital waiver authority and the [employer] Act, this Virtual Visit was conducted, with patient's consent, to reduce the patient's risk of exposure to COVID-19 and provide continuity of care for an established patient. Services were provided through a video synchronous discussion virtually to substitute for in-person clinic visit. Pt gave verbal informed consent to participate in telehealth services. Conducted a risk-benefit analysis and determined that the patient's presenting problems are consistent with the use of telepsychology. Determined that the patient has sufficient knowledge and skills in the use of technology enabling them to adequately benefit from telepsychology. It was determined that this patient was able to be properly treated without an in-person session. Patient verified that they were currently located at the [facility] address that was provided during registration or another [facility] address, if noted below.     Verified the following information:  Patient's identification: Yes  Patient location: [address] [place] [address]   Patient's call back number: [phone]   Patient's emergency contact's name and number, as well as permission to contact them if needed: Extended Emergency Contact Information  Primary Emergency Contact: Blair Mckeon  Address: [address] [place] [address], [address] [place] [address] Phone: [phone]  Work Phone: [phone]  Relation: Parent  Secondary Emergency Contact: Dorita Baumann  Address: [address] [place] [address], [address] [place] [address] Phone: [phone]  Work Phone: [phone]  Relation: Parent     Provider location: [facility] [address] Consultation Luigi Deng, Ph.D.  Psychologist  12/21/2020  1:06 PM      Time spent with Patient: 26 minutes  This is patient's 13th  [facility] appointment. Reason for Consult:    Chief Complaint   Patient presents with    Anxiety       Feedback given to PCP. S:  Pt seen for f/u of anxiety and ADHD. Pt reported stable mood and sxs. [place] she has had an ear infection and believes the water in her ears was reason for dizziness. Did have a nightmare after talking about [place]. Discussed the manipulation and gas lighting she experienced. Reviewed what now feel like red flags.      O:  MSE:    Appearance    alert, cooperative  Appetite normal  Sleep disturbance Yes  Fatigue Yes  Loss of pleasure No  Impulsive behavior No  Speech    spontaneous, normal rate, normal volume and well articulated  Mood    Anxious  Affect    normal affect  Thought Content    intact and cognitive distortions  Thought Process    goal directed and coherent  Associations    logical connections  Insight    Fair  Judgment    Intact  Orientation    oriented to person, place, time, and general circumstances  Memory    recent and remote memory intact  Attention/Concentration    impaired  Morbid ideation No  Suicide Assessment    no suicidal ideation    History:  Social History:   Social History     Socioeconomic History    Marital status: Single     Spouse name: Not on file    Number of children: 0    Years of education: 12    Highest education level: Not on file   Occupational History    Occupation:  grad     Comment: biology    Occupation: [employer]      Comment: [address] resource strain: Not hard at all   [place]-Fay insecurity     Worry: Never true     Inability: Never true    Transportation needs     Medical: No     Non-medical: No   Tobacco Use    Smoking status: Former Smoker     Packs/day: 0.25     Years: 0.50     Pack years: 0.12     Types: Cigarettes     Start date: 11/1/2013 Quit date: 2013     Years since quittin.9    Smokeless tobacco: Never Used    Tobacco comment: Denies smoking at this time   Substance and Sexual Activity    Alcohol use: Yes     Comment: h/o weekend binges 5 or more drinks when out 1-2x/week;  :  cut most to all drinking out    Drug use: Yes     Types: Marijuana    Sexual activity: Not Currently     Partners: Male     Birth control/protection: Condom, OCP   Lifestyle    Physical activity     Days per week: Not on file     Minutes per session: Not on file    Stress: Not on file   Relationships    Social connections     Talks on phone: Not on file     Gets together: Not on file     Attends Yarsanism service: Not on file     Active member of club or organization: Not on file     Attends meetings of clubs or organizations: Not on file     Relationship status: Not on file    Intimate partner violence     Fear of current or ex partner: Not on file     Emotionally abused: Not on file     Physically abused: Not on file     Forced sexual activity: Not on file   Other Topics Concern    Not on file   Social History Narrative    Not on file     TOBACCO:   reports that she quit smoking about 6 years ago. Her smoking use included cigarettes. She started smoking about 7 years ago. She has a 0.13 pack-year smoking history. She has never used smokeless tobacco.  ETOH:   reports current alcohol use. Diagnosis:  1. VERA (generalized anxiety disorder)    2. Attention deficit hyperactivity disorder (ADHD), predominantly inattentive type          Plan:  Pt interventions:  Trained in strategies for increasing balanced thinking and Supportive techniques        Documentation was done using voice recognition dragon software. Every effort was made to ensure accuracy; however, inadvertent, unintentional computerized transcription errors may be present.

## 2021-01-13 ENCOUNTER — OFFICE VISIT (OUTPATIENT)
Dept: INTERNAL MEDICINE CLINIC | Age: 31
End: 2021-01-13
Payer: COMMERCIAL

## 2021-01-13 VITALS
HEART RATE: 80 BPM | DIASTOLIC BLOOD PRESSURE: 82 MMHG | TEMPERATURE: 97.3 F | BODY MASS INDEX: 33.65 KG/M2 | HEIGHT: 68 IN | SYSTOLIC BLOOD PRESSURE: 136 MMHG | WEIGHT: 222 LBS

## 2021-01-13 DIAGNOSIS — F41.1 GAD (GENERALIZED ANXIETY DISORDER): ICD-10-CM

## 2021-01-13 DIAGNOSIS — F41.0 PANIC DISORDER: ICD-10-CM

## 2021-01-13 DIAGNOSIS — Z79.899 CHRONIC USE OF BENZODIAZEPINE FOR THERAPEUTIC PURPOSE: Primary | ICD-10-CM

## 2021-01-13 DIAGNOSIS — L29.9 EAR ITCHING: ICD-10-CM

## 2021-01-13 DIAGNOSIS — F90.0 ATTENTION DEFICIT HYPERACTIVITY DISORDER (ADHD), PREDOMINANTLY INATTENTIVE TYPE: ICD-10-CM

## 2021-01-13 DIAGNOSIS — Z30.41 ORAL CONTRACEPTIVE PILL SURVEILLANCE: ICD-10-CM

## 2021-01-13 PROCEDURE — 99213 OFFICE O/P EST LOW 20 MIN: CPT | Performed by: FAMILY MEDICINE

## 2021-01-13 RX ORDER — DEXTROAMPHETAMINE SACCHARATE, AMPHETAMINE ASPARTATE MONOHYDRATE, DEXTROAMPHETAMINE SULFATE AND AMPHETAMINE SULFATE 2.5; 2.5; 2.5; 2.5 MG/1; MG/1; MG/1; MG/1
10-20 CAPSULE, EXTENDED RELEASE ORAL DAILY
Qty: 60 CAPSULE | Refills: 0 | Status: SHIPPED | OUTPATIENT
Start: 2021-01-18 | End: 2021-06-28 | Stop reason: SDUPTHER

## 2021-01-13 RX ORDER — ALPRAZOLAM 0.5 MG/1
TABLET ORAL
Qty: 20 TABLET | Refills: 2 | Status: SHIPPED | OUTPATIENT
Start: 2021-01-13 | End: 2021-03-16 | Stop reason: SDUPTHER

## 2021-01-13 RX ORDER — LEVONORGESTREL AND ETHINYL ESTRADIOL 0.15-0.03
KIT ORAL
Qty: 91 TABLET | Refills: 3 | Status: SHIPPED | OUTPATIENT
Start: 2021-01-13 | End: 2022-03-04

## 2021-01-13 ASSESSMENT — PATIENT HEALTH QUESTIONNAIRE - PHQ9
SUM OF ALL RESPONSES TO PHQ QUESTIONS 1-9: 0
SUM OF ALL RESPONSES TO PHQ QUESTIONS 1-9: 0

## 2021-01-13 NOTE — PROGRESS NOTES
Subjective:      Patient ID: Elvi Bolivar is a 31 y.o. female. HPI   Chief Complaint   Patient presents with    3 Month Follow-Up     would like to discuss adderall dose        90 day follow up for controlled substance(s)  Adderall and prn benzodiazepine. OD risk:  380  Lorazepam Equiv dose (if applicable)  8.32 with last refill 12/8 and 11/6 for 20 pills  Morphine Equiv dose (if applicable)  NA  Using medication for:  Panic attacks. Takes bid on the days she has a panic attack about 10 days per month. Functional improvement noted compared to before taking this controlled medication:  Able to keep her heart from racing and to go to work. Adverse effects:  none    Aberrant behavior: none    The entire summer did not have a migraine but starting in Oct she started she to have migraines and taking the 20 mg of Adderall made her migraines worse but taking 10 mg was OK and did not aggravate her headaches. .     Asked to check her ears because of itching. See Assessment for more detail on conditions addressed today.     Patient Active Problem List   Diagnosis    Migraine with aura and without status migrainosus, not intractable    ADHD (attention deficit hyperactivity disorder)    Allergic rhinitis    Kidney stone    Hypersomnia    VERA (generalized anxiety disorder)    Oral contraceptive pill surveillance    GERD (gastroesophageal reflux disease)    Cervico-occipital neuralgia    Chronic use of benzodiazepine for therapeutic purpose    Obstructive sleep apnea (adult) (pediatric)    Irritable bowel syndrome with diarrhea    Panic disorder         Outpatient Medications Marked as Taking for the 1/13/21 encounter (Office Visit) with Thor Gutierrez MD   Medication Sig Dispense Refill    escitalopram (LEXAPRO) 5 MG tablet Take 1 tablet by mouth daily 30 tablet 1    levonorgestrel-ethinyl estradiol (INTROVALE) 0.15-0.03 MG per tablet TAKE 1 TABLET BY MOUTH DAILY 91 tablet 3 1. Chronic use of benzodiazepine for therapeutic purpose  OARRS profile checked and is valid. 2. Panic disorder  Encouraged to use deep breathing and propranolol first.    - ALPRAZolam (XANAX) 0.5 MG tablet; TAKE 1 TABLET BY MOUTH AT ONSET OF PANIC ATTACK AND REPEAT SECOND DOSE IN ONE HOUR IF NOT RESOLVED  Dispense: 20 tablet; Refill: 2    3. VERA (generalized anxiety disorder)  On Lexapro 5 mg daily. Will consider increased dose next visit but adjusting Adderall toda. 4. Attention deficit hyperactivity disorder (ADHD), predominantly inattentive type  Use 10 mg for more flexibility. She does not take daily and her 30 day supply was last filled 11/30 = 45 days ago and still has some left. - amphetamine-dextroamphetamine (ADDERALL XR) 10 MG extended release capsule; Take 1-2 capsules by mouth daily for 30 days. Take 1 on days where you do not need 2 per day. Dispense: 60 capsule; Refill: 0    5. Oral contraceptive pill surveillance  Routine refill.    - levonorgestrel-ethinyl estradiol (INTROVALE) 0.15-0.03 MG per tablet; TAKE 1 TABLET BY MOUTH DAILY  Dispense: 91 tablet; Refill: 3    6. Ear itching  Use ear oil prn. No infection. Maybe a little weather related eczema. Plan:      See orders. See after visit summary, patient instructions, and reference hand-outs. A PRINTED SUMMARY = AVS GIVEN TO THE PATIENT, (or if Virtual Visit, patient told it is available in My Chart or will be mailed if not active on My Chart.)    Discussed use, benefit, and side effects of prescribed medications. Barriers to medication compliance addressed. All patient questions answered.           Candi Mendoza MD

## 2021-01-13 NOTE — PATIENT INSTRUCTIONS
Try a chiropractic office. Medical massage is rarely covered outside of this unless car insurance for auto accident insurance.     Ear oil for the itching

## 2021-01-15 ENCOUNTER — PATIENT MESSAGE (OUTPATIENT)
Dept: INTERNAL MEDICINE CLINIC | Age: 31
End: 2021-01-15

## 2021-01-15 DIAGNOSIS — G44.59 OTHER COMPLICATED HEADACHE SYNDROME: Primary | ICD-10-CM

## 2021-01-20 ENCOUNTER — VIRTUAL VISIT (OUTPATIENT)
Dept: PSYCHOLOGY | Age: 31
End: 2021-01-20
Payer: COMMERCIAL

## 2021-01-20 DIAGNOSIS — F41.1 GAD (GENERALIZED ANXIETY DISORDER): ICD-10-CM

## 2021-01-20 DIAGNOSIS — F41.0 PANIC DISORDER: Primary | ICD-10-CM

## 2021-01-20 DIAGNOSIS — F90.0 ATTENTION DEFICIT HYPERACTIVITY DISORDER (ADHD), PREDOMINANTLY INATTENTIVE TYPE: ICD-10-CM

## 2021-01-20 PROCEDURE — 90832 PSYTX W PT 30 MINUTES: CPT | Performed by: PSYCHOLOGIST

## 2021-01-20 NOTE — PROGRESS NOTES
Eufemia De LaC ruz, Ph.D.  Psychologist  1/20/2021  1:01 PM      Time spent with Patient: 30 minutes  This is patient's 14th  [facility] appointment. Reason for Consult:    Chief Complaint   Patient presents with    Anxiety       Feedback given to PCP. S:  Pt seen for f/u of anxiety and ADHD. Pt reported unchanged mood and sxs. Noted daily migraines often occurring when shortly after awakening. Is scheduled w neurology, but not until April. Is having ear pain/fullness again, dizziness, eye pain. Is hoping MRI will illuminate any issues w sinuses that may be contributing. Anxiety has been \"terrible\" and depression worsened. Stated the dizziness and subsequent anxiety is frightening that she avoids going places. Noted other sxs associated w panic such as dissociation, narrowing of vision, restlessness/trembling. Did purchase a Happy Light and thinks it is helping. Discussed regular relaxation exercises throughout the day to address baseline anx. Pt has monthly massage scheduled.      O:  MSE:    Appearance    alert, cooperative  Appetite normal  Sleep disturbance Yes  Fatigue Yes  Loss of pleasure No  Impulsive behavior No  Speech    spontaneous, normal rate, normal volume and well articulated  Mood    Anxious  Affect    normal affect  Thought Content    intact and cognitive distortions  Thought Process    goal directed and coherent  Associations    logical connections  Insight    Fair  Judgment    Intact  Orientation    oriented to person, place, time, and general circumstances  Memory    recent and remote memory intact  Attention/Concentration    impaired  Morbid ideation No  Suicide Assessment    no suicidal ideation    History:  Social History:   Social History     Socioeconomic History    Marital status: Single     Spouse name: Not on file    Number of children: 0    Years of education: 12    Highest education level: Not on file   Occupational History    Occupation:  grad     Comment: biology 3. Attention deficit hyperactivity disorder (ADHD), predominantly inattentive type          Plan:  Pt interventions:  Discussed self-care (sleep, nutrition, rewarding activities, social support, exercise), Supportive techniques and Provided Psychoeducation re: managing baseline anx        Documentation was done using voice recognition dragon software. Every effort was made to ensure accuracy; however, inadvertent, unintentional computerized transcription errors may be present.

## 2021-01-28 ENCOUNTER — VIRTUAL VISIT (OUTPATIENT)
Dept: PSYCHOLOGY | Age: 31
End: 2021-01-28
Payer: COMMERCIAL

## 2021-01-28 DIAGNOSIS — F90.0 ATTENTION DEFICIT HYPERACTIVITY DISORDER (ADHD), PREDOMINANTLY INATTENTIVE TYPE: ICD-10-CM

## 2021-01-28 DIAGNOSIS — F41.1 GAD (GENERALIZED ANXIETY DISORDER): Primary | ICD-10-CM

## 2021-01-28 PROCEDURE — 90832 PSYTX W PT 30 MINUTES: CPT | Performed by: PSYCHOLOGIST

## 2021-01-28 NOTE — PROGRESS NOTES
Behavioral Health Consultation  Facundo Elizalde, Ph.D.  Psychologist  1/28/2021  10:31 AM      Time spent with Patient: 30 minutes  This is patient's 15th  [facility] appointment. Reason for Consult:    Chief Complaint   Patient presents with    Anxiety       Feedback given to PCP. S:  Pt seen for f/u of anxiety and depression. Pt reported largely unchanged mood and sxs. Stated she did have 1 day wo anxiety and dizziness, did engage in PMR that day and has been some inconsistent since. Anxiety and dizziness has been back since. Has been trying to incorporate anti-inflammatory foods and eating more vegetables. Has had a low-grade fever for 5 days, COVID tests are negative. Sees ENT on Friday and is hopeful there is physical explanation for her dizziness and hopes that will resolve her anxiety. However, unclear if the dizziness is causative of her anxiety. Discussed using time- or item-limited goal-setting to help w her sense of being overwhelmed by keeping up her home.      O:  MSE:    Appearance    alert, cooperative  Appetite normal  Sleep disturbance Yes  Fatigue Yes  Loss of pleasure Yes  Impulsive behavior No  Speech    spontaneous, normal rate, normal volume and well articulated  Mood    Anxious  Affect    anxiety  Thought Content    intact and cognitive distortions  Thought Process    goal directed and coherent  Associations    logical connections  Insight    Fair  Judgment    Fair  Orientation    oriented to person, place, time, and general circumstances  Memory    recent and remote memory intact  Attention/Concentration    impaired  Morbid ideation No  Suicide Assessment    no suicidal ideation    History:  Social History:   Social History     Socioeconomic History    Marital status: Single     Spouse name: Not on file    Number of children: 0    Years of education: 12    Highest education level: Not on file   Occupational History    Occupation:  grad     Comment: biology  Occupation: [employer]      Comment: [address] Financial resource strain: Not hard at all   Donna-Fay insecurity     Worry: Never true     Inability: Never true   wedgies needs     Medical: No     Non-medical: No   Tobacco Use    Smoking status: Former Smoker     Packs/day: 0.25     Years: 0.50     Pack years: 0.12     Types: Cigarettes     Start date: 2013     Quit date: 2013     Years since quittin.0    Smokeless tobacco: Never Used    Tobacco comment: Denies smoking at this time   Substance and Sexual Activity    Alcohol use: Yes     Comment: h/o weekend binges 5 or more drinks when out 1-2x/week;  :  cut most to all drinking out    Drug use: Yes     Types: Marijuana    Sexual activity: Not Currently     Partners: Male     Birth control/protection: Condom, OCP   Lifestyle    Physical activity     Days per week: Not on file     Minutes per session: Not on file    Stress: Not on file   Relationships    Social connections     Talks on phone: Not on file     Gets together: Not on file     Attends Spiritism service: Not on file     Active member of club or organization: Not on file     Attends meetings of clubs or organizations: Not on file     Relationship status: Not on file    Intimate partner violence     Fear of current or ex partner: Not on file     Emotionally abused: Not on file     Physically abused: Not on file     Forced sexual activity: Not on file   Other Topics Concern    Not on file   Social History Narrative    Not on file     TOBACCO:   reports that she quit smoking about 7 years ago. Her smoking use included cigarettes. She started smoking about 7 years ago. She has a 0.13 pack-year smoking history. She has never used smokeless tobacco.  ETOH:   reports current alcohol use. Diagnosis:  1. VERA (generalized anxiety disorder)    2.  Attention deficit hyperactivity disorder (ADHD), predominantly inattentive type Plan:  Pt interventions:  Supportive techniques and Provided Psychoeducation re: goal-setting    TELEHEALTH VISIT -- Audio/Visual (During ROJAM-38 public health emergency)  }  Pursuant to the emergency declaration under the [address] [place] [address] [facility] and the [employer] Act, this Virtual Visit was conducted, with patient's consent, to reduce the patient's risk of exposure to COVID-19 and provide continuity of care for an established patient. Services were provided through a video synchronous discussion virtually to substitute for in-person clinic visit. Pt gave verbal informed consent to participate in telehealth services. Conducted a risk-benefit analysis and determined that the patient's presenting problems are consistent with the use of telepsychology. Determined that the patient has sufficient knowledge and skills in the use of technology enabling them to adequately benefit from telepsychology. It was determined that this patient was able to be properly treated without an in-person session. Patient verified that they were currently located at the [facility] address that was provided during registration or another [facility] address, if noted below.     Verified the following information:  Patient's identification: Yes  Patient location: [address] [place] [address] [place]    Patient's call back number: [phone]   Patient's emergency contact's name and number, as well as permission to contact them if needed: Extended Emergency Contact Information  Primary Emergency Contact: Blair Mckeon  Address: [address] [facility], [address] [place] [address] Phone: [phone]  Work Phone: [phone]  Relation: Parent  Secondary Emergency Contact: Dorita Baumann  Address: [address] [place] [address], [address] [place] [address] Phone: [phone]  Work Phone: [phone] Relation: Parent     Provider location: [place], [place]      Documentation was done using voice recognition dragon software. Every effort was made to ensure accuracy; however, inadvertent, unintentional computerized transcription errors may be present.

## 2021-02-17 ENCOUNTER — VIRTUAL VISIT (OUTPATIENT)
Dept: PSYCHOLOGY | Age: 31
End: 2021-02-17
Payer: COMMERCIAL

## 2021-02-17 DIAGNOSIS — F41.1 GAD (GENERALIZED ANXIETY DISORDER): Primary | ICD-10-CM

## 2021-02-17 DIAGNOSIS — F90.0 ATTENTION DEFICIT HYPERACTIVITY DISORDER (ADHD), PREDOMINANTLY INATTENTIVE TYPE: ICD-10-CM

## 2021-02-17 PROCEDURE — 90832 PSYTX W PT 30 MINUTES: CPT | Performed by: PSYCHOLOGIST
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
## 2021-03-03 ENCOUNTER — VIRTUAL VISIT (OUTPATIENT)
Dept: PSYCHOLOGY | Age: 31
End: 2021-03-03
Payer: COMMERCIAL

## 2021-03-03 DIAGNOSIS — F41.0 PANIC DISORDER: ICD-10-CM

## 2021-03-03 DIAGNOSIS — F41.1 GAD (GENERALIZED ANXIETY DISORDER): Primary | ICD-10-CM

## 2021-03-03 DIAGNOSIS — F90.0 ATTENTION DEFICIT HYPERACTIVITY DISORDER (ADHD), PREDOMINANTLY INATTENTIVE TYPE: ICD-10-CM

## 2021-03-03 PROCEDURE — 90832 PSYTX W PT 30 MINUTES: CPT | Performed by: PSYCHOLOGIST
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
## 2021-03-08 ENCOUNTER — OFFICE VISIT (OUTPATIENT)
Dept: INTERNAL MEDICINE CLINIC | Age: 31
End: 2021-03-08
Payer: COMMERCIAL

## 2021-03-08 VITALS
SYSTOLIC BLOOD PRESSURE: 130 MMHG | DIASTOLIC BLOOD PRESSURE: 80 MMHG | HEIGHT: 68 IN | WEIGHT: 218 LBS | HEART RATE: 64 BPM | BODY MASS INDEX: 33.04 KG/M2 | TEMPERATURE: 96.6 F

## 2021-03-08 DIAGNOSIS — R10.9 FLANK PAIN: Primary | ICD-10-CM

## 2021-03-08 DIAGNOSIS — N30.00 ACUTE CYSTITIS WITHOUT HEMATURIA: ICD-10-CM

## 2021-03-08 LAB
BILIRUBIN, POC: ABNORMAL
BLOOD URINE, POC: ABNORMAL
CLARITY, POC: ABNORMAL
COLOR, POC: YELLOW
GLUCOSE URINE, POC: ABNORMAL
KETONES, POC: ABNORMAL
LEUKOCYTE EST, POC: ABNORMAL
NITRITE, POC: ABNORMAL
PH, POC: 8.5
PROTEIN, POC: ABNORMAL
SPECIFIC GRAVITY, POC: 1.02
UROBILINOGEN, POC: 0.2

## 2021-03-08 PROCEDURE — 99213 OFFICE O/P EST LOW 20 MIN: CPT | Performed by: FAMILY MEDICINE

## 2021-03-08 PROCEDURE — 81002 URINALYSIS NONAUTO W/O SCOPE: CPT | Performed by: FAMILY MEDICINE

## 2021-03-08 RX ORDER — CIPROFLOXACIN 500 MG/1
500 TABLET, FILM COATED ORAL 2 TIMES DAILY
Qty: 10 TABLET | Refills: 0 | Status: SHIPPED | OUTPATIENT
Start: 2021-03-08 | End: 2021-03-13

## 2021-03-08 ASSESSMENT — ENCOUNTER SYMPTOMS
NAUSEA: 0
DIARRHEA: 0
VOMITING: 0

## 2021-03-08 NOTE — PROGRESS NOTES
Subjective:      Patient ID: Tiffanie Tyler is a 31 y.o. female. HPI   Chief Complaint   Patient presents with    Flank Pain     1 week of right low back pain. Started to get worse after 3 days. Right low back pain and sometimes shoots around the front. Constant pain with standing and sitting. Still about the same to lay down. No improvement with any position changes. Stretching does not help it. Feels nauseated with the pain  The pain has gotten worse even with resting. Not relieved or better with activity, which helped kidney stone pain in the past.     No dysuria. Friday she did have a temp of 100 but did have her 2nd COVID vaccine right before that. Not sexually active for 2 years. Review of Systems   Constitutional: Negative for fever. Gastrointestinal: Negative for diarrhea, nausea and vomiting. Genitourinary: Negative for difficulty urinating, flank pain, hematuria and vaginal discharge. Objective:   Physical Exam  Vitals signs reviewed. Constitutional:       General: She is not in acute distress. Appearance: She is well-developed. She is not ill-appearing or diaphoretic. Comments: Does not appear ill or toxic but appears to be not feeling well  (different than usual status). Cardiovascular:      Rate and Rhythm: Normal rate and regular rhythm. Heart sounds: Normal heart sounds. Pulmonary:      Effort: Pulmonary effort is normal.      Breath sounds: Normal breath sounds. No rales. Abdominal:      General: Bowel sounds are normal. There is no distension or abdominal bruit. Palpations: Abdomen is soft. There is no mass. Tenderness: There is abdominal tenderness in the suprapubic area and left lower quadrant. There is no guarding or rebound. Negative signs include Crane's sign and McBurney's sign. Hernia: No hernia is present. Musculoskeletal:      Lumbar back: She exhibits tenderness.  She exhibits no bony tenderness, no swelling, no deformity, no pain and no spasm. Skin:     General: Skin is warm and dry. Coloration: Skin is not pale. Findings: No rash. Psychiatric:         Behavior: Behavior normal.            Results for POC orders placed in visit on 03/08/21   POCT Urinalysis no Micro   Result Value Ref Range    Color, UA YELLOW     Clarity, UA CLOUDY     Glucose, UA POC NEG     Bilirubin, UA NEG     Ketones, UA NEG     Spec Grav, UA 1.020     Blood, UA POC NEG     pH, UA 8.5     Protein, UA POC NEG     Urobilinogen, UA 0.2     Leukocytes, UA MODERATE     Nitrite, UA NEG          Assessment:      1. Flank pain  Suspicious for UTI. No sign of blood in urine so doubt kidney stone. Not really tender over lumbar muscles. - POCT Urinalysis no Micro  - Culture, Urine    2. Acute cystitis without hematuria  Most likely cause of flank pain. No fever or nausea/vomiting and does not appear appear sick, so doubt pyelonephritis. - ciprofloxacin (CIPRO) 500 MG tablet; Take 1 tablet by mouth 2 times daily for 5 days  Dispense: 10 tablet; Refill: 0  2        Plan:      See orders. See after visit summary, patient instructions, and reference hand-outs. A PRINTED SUMMARY = AVS GIVEN TO THE PATIENT, (or if Virtual Visit, patient told it is available in My Chart or will be mailed if not active on My Chart.)    Discussed use, benefit, and side effects of prescribed medications. Barriers to medication compliance addressed. All patient questions answered.           Amber Howard MD

## 2021-03-08 NOTE — PATIENT INSTRUCTIONS
Patient Education        Urinary Tract Infection in Women: Care Instructions  Your Care Instructions     A urinary tract infection, or UTI, is a general term for an infection anywhere between the kidneys and the urethra (where urine comes out). Most UTIs are bladder infections. They often cause pain or burning when you urinate. UTIs are caused by bacteria and can be cured with antibiotics. Be sure to complete your treatment so that the infection goes away. Follow-up care is a key part of your treatment and safety. Be sure to make and go to all appointments, and call your doctor if you are having problems. It's also a good idea to know your test results and keep a list of the medicines you take. How can you care for yourself at home? · Take your antibiotics as directed. Do not stop taking them just because you feel better. You need to take the full course of antibiotics. · Drink extra water and other fluids for the next day or two. This may help wash out the bacteria that are causing the infection. (If you have kidney, heart, or liver disease and have to limit fluids, talk with your doctor before you increase your fluid intake.)  · Avoid drinks that are carbonated or have caffeine. They can irritate the bladder. · Urinate often. Try to empty your bladder each time. · To relieve pain, take a hot bath or lay a heating pad set on low over your lower belly or genital area. Never go to sleep with a heating pad in place. To prevent UTIs  · Drink plenty of water each day. This helps you urinate often, which clears bacteria from your system. (If you have kidney, heart, or liver disease and have to limit fluids, talk with your doctor before you increase your fluid intake.)  · Urinate when you need to. · Urinate right after you have sex. · Change sanitary pads often. · Avoid douches, bubble baths, feminine hygiene sprays, and other feminine hygiene products that have deodorants.   · After going to the bathroom, wipe from front to back. When should you call for help? Call your doctor now or seek immediate medical care if:    · Symptoms such as fever, chills, nausea, or vomiting get worse or appear for the first time.     · You have new pain in your back just below your rib cage. This is called flank pain.     · There is new blood or pus in your urine.     · You have any problems with your antibiotic medicine. Watch closely for changes in your health, and be sure to contact your doctor if:    · You are not getting better after taking an antibiotic for 2 days.     · Your symptoms go away but then come back. Where can you learn more? Go to [email or web address].[email or web address] org and sign in to your [employer] account. Enter B952 in the NodePing box to learn more about \"Urinary Tract Infection in Women: Care Instructions. \"     If you do not have an account, please click on the \"Sign Up Now\" link. Current as of: June 29, 2020               Content Version: 12.6  © 2006-2020 [employer]. Care instructions adapted under license by [facility] ([facility]). If you have questions about a medical condition or this instruction, always ask your healthcare professional. [place] [address] any warranty or liability for your use of this information.

## 2021-03-09 LAB — URINE CULTURE, ROUTINE: NORMAL

## 2021-03-16 ENCOUNTER — VIRTUAL VISIT (OUTPATIENT)
Dept: INTERNAL MEDICINE CLINIC | Age: 31
End: 2021-03-16
Payer: COMMERCIAL

## 2021-03-16 DIAGNOSIS — R10.9 RIGHT FLANK PAIN: Primary | ICD-10-CM

## 2021-03-16 DIAGNOSIS — R30.0 DYSURIA: ICD-10-CM

## 2021-03-16 PROCEDURE — 99213 OFFICE O/P EST LOW 20 MIN: CPT | Performed by: FAMILY MEDICINE

## 2021-03-16 RX ORDER — NITROFURANTOIN 25; 75 MG/1; MG/1
100 CAPSULE ORAL 2 TIMES DAILY
Qty: 14 CAPSULE | Refills: 0 | Status: SHIPPED | OUTPATIENT
Start: 2021-03-16 | End: 2021-03-23

## 2021-03-16 ASSESSMENT — ENCOUNTER SYMPTOMS: NAUSEA: 1

## 2021-03-16 NOTE — PROGRESS NOTES
Patricia Pearce (:  1990) is a 31 y.o. female,Established patient, here for evaluation of the following chief complaint(s): Urinary Tract Infection (bladder pain has returned, and now buring with urination. )      ASSESSMENT/PLAN:  1. Right flank pain  Need to consider kidney stone vs pyelonephritis. - CT ABDOMEN PELVIS WO CONTRAST Additional Contrast? None; Future  - Urinalysis  - Culture, Urine    2. Dysuria  Recheck urine and start treatment with Macrobid 100 mg bid x 7 days. - CT ABDOMEN PELVIS WO CONTRAST Additional Contrast? None; Future  - Urinalysis  - Culture, Urine      No follow-ups on file. SUBJECTIVE/OBJECTIVE:  HPI     Dysuria  Was seen for flank pain 3/49387. Has h/o kidney stone 10/2020. No blood on UA at that last visit. Had mod leuks so treated with ABX    She felt better for 2-3 days off the antibiotic and then felt bad again. Treated with 500 mg Cipro bid x 5 days. Before it was not burning to urinate when seen on 3/8. Still has pain in flanks right > left  Left is just twinges of pain. Random sharp pains in bladder area and tired. No fever but no temp checked. Not sexually active for several months. Review of Systems   Gastrointestinal: Positive for nausea. Genitourinary: Positive for dysuria and flank pain (right side;  twinges of pain on left also). Negative for decreased urine volume, difficulty urinating and hematuria. Patient-Reported Vitals 10/20/2020   Patient-Reported Weight 211 lb   Patient-Reported Height 68 in   Patient-Reported Pulse 84   Patient-Reported Temperature -        Physical Exam  Constitutional:       Appearance: Normal appearance. She is ill-appearing (looks tired and mildly ill). Eyes:      General: No scleral icterus. Pulmonary:      Effort: Pulmonary effort is normal. No respiratory distress. Skin:     Coloration: Skin is not pale. Neurological:      General: No focal deficit present.       Mental Status: She is alert and oriented to person, place, and time. Psychiatric:         Behavior: Behavior normal.         Nely Noe, was evaluated through a synchronous (real-time) audio-video encounter. The patient (or guardian if applicable) is aware that this is a billable service. Verbal consent to proceed has been obtained within the past 12 months. The visit was conducted pursuant to the emergency declaration under the [address] [place] [address] and the [employer] and [employer] Act. Patient identification was verified, and a caregiver was present when appropriate. The patient was located in a state where the provider was credentialed to provide care. An electronic signature was used to authenticate this note.     --Madelyn Guzmán MD

## 2021-03-18 DIAGNOSIS — G43.009 MIGRAINE WITHOUT AURA AND WITHOUT STATUS MIGRAINOSUS, NOT INTRACTABLE: ICD-10-CM

## 2021-03-18 RX ORDER — SUMATRIPTAN 100 MG/1
TABLET, FILM COATED ORAL
Qty: 9 TABLET | Refills: 3 | Status: SHIPPED | OUTPATIENT
Start: 2021-03-18 | End: 2021-03-21 | Stop reason: SDUPTHER

## 2021-03-21 DIAGNOSIS — G43.009 MIGRAINE WITHOUT AURA AND WITHOUT STATUS MIGRAINOSUS, NOT INTRACTABLE: ICD-10-CM

## 2021-03-21 DIAGNOSIS — G43.019 INTRACTABLE MIGRAINE WITHOUT AURA AND WITHOUT STATUS MIGRAINOSUS: ICD-10-CM

## 2021-03-22 ENCOUNTER — VIRTUAL VISIT (OUTPATIENT)
Dept: PSYCHOLOGY | Age: 31
End: 2021-03-22
Payer: COMMERCIAL

## 2021-03-22 ENCOUNTER — TELEPHONE (OUTPATIENT)
Dept: ADMINISTRATIVE | Age: 31
End: 2021-03-22

## 2021-03-22 DIAGNOSIS — F41.1 GAD (GENERALIZED ANXIETY DISORDER): ICD-10-CM

## 2021-03-22 DIAGNOSIS — F41.0 PANIC DISORDER: Primary | ICD-10-CM

## 2021-03-22 DIAGNOSIS — F90.0 ATTENTION DEFICIT HYPERACTIVITY DISORDER (ADHD), PREDOMINANTLY INATTENTIVE TYPE: ICD-10-CM

## 2021-03-22 PROCEDURE — 90832 PSYTX W PT 30 MINUTES: CPT | Performed by: PSYCHOLOGIST

## 2021-03-22 RX ORDER — FREMANEZUMAB-VFRM 225 MG/1.5ML
225 INJECTION SUBCUTANEOUS
Qty: 1.5 ML | Refills: 2 | Status: SHIPPED
Start: 2021-03-22 | End: 2021-06-24 | Stop reason: ALTCHOICE

## 2021-03-22 RX ORDER — HYDROXYZINE PAMOATE 25 MG/1
CAPSULE ORAL
Qty: 30 CAPSULE | Refills: 3 | Status: SHIPPED | OUTPATIENT
Start: 2021-03-22 | End: 2022-06-27 | Stop reason: SDUPTHER

## 2021-03-22 RX ORDER — SUMATRIPTAN 100 MG/1
TABLET, FILM COATED ORAL
Qty: 9 TABLET | Refills: 3 | Status: SHIPPED | OUTPATIENT
Start: 2021-03-22 | End: 2021-05-06

## 2021-03-22 NOTE — PROGRESS NOTES
Karene Seip, Ph.D.  Psychologist  3/22/2021  8:59 AM      Time spent with Patient: 28 minutes  This is patient's 18th  [facility] appointment. Reason for Consult:    Chief Complaint   Patient presents with    Anxiety       Feedback given to PCP. S:  Pt seen for f/u of anxiety. Pt reported worsened mood and sxs. Stated she experiences chest pain 2/2 gas-related pain, pt becomes concerned that she is having a heart attack, anxiety peaks, makes it difficult to maintain anxiety sxs. Noted it subsides once she is able to burp. Starting to practice to relax jaw, tongue to help w her PSNS response. Checking pulse and seeing it is normal helps, but needs to re-check. Discussed this reality testing approach. Reported the dizziness she was experiencing resolves when she takes off her glasses, wonders if her rx is too strong. Has eye appt.      O:  MSE:    Appearance    alert, cooperative  Appetite normal  Sleep disturbance Yes  Fatigue Yes  Loss of pleasure Yes  Impulsive behavior No  Speech    spontaneous, normal rate, normal volume and well articulated  Mood    Anxious  Affect    anxiety  Thought Content    intact and cognitive distortions  Thought Process    goal directed and coherent  Associations    logical connections  Insight    Fair  Judgment    Fair  Orientation    oriented to person, place, time, and general circumstances  Memory    recent and remote memory intact  Attention/Concentration    impaired  Morbid ideation No  Suicide Assessment    no suicidal ideation    History:  Social History:   Social History     Socioeconomic History    Marital status: Single     Spouse name: Not on file    Number of children: 0    Years of education: 12    Highest education level: Not on file   Occupational History    Occupation:  grad     Comment: biology    Occupation: [employer]      Comment: [address] resource strain: Not hard at all   [employer] insecurity     Worry: Never true     Inability: Never true    Transportation needs     Medical: No     Non-medical: No   Tobacco Use    Smoking status: Former Smoker     Packs/day: 0.25     Years: 0.50     Pack years: 0.12     Types: Cigarettes     Start date: 2013     Quit date: 2013     Years since quittin.2    Smokeless tobacco: Never Used    Tobacco comment: Denies smoking at this time   Substance and Sexual Activity    Alcohol use: Yes     Comment: h/o weekend binges 5 or more drinks when out 1-2x/week;  :  cut most to all drinking out    Drug use: Yes     Types: Marijuana    Sexual activity: Not Currently     Partners: Male     Birth control/protection: Condom, OCP   Lifestyle    Physical activity     Days per week: Not on file     Minutes per session: Not on file    Stress: Not on file   Relationships    Social connections     Talks on phone: Not on file     Gets together: Not on file     Attends Quaker service: Not on file     Active member of club or organization: Not on file     Attends meetings of clubs or organizations: Not on file     Relationship status: Not on file    Intimate partner violence     Fear of current or ex partner: Not on file     Emotionally abused: Not on file     Physically abused: Not on file     Forced sexual activity: Not on file   Other Topics Concern    Not on file   Social History Narrative    Not on file     TOBACCO:   reports that she quit smoking about 7 years ago. Her smoking use included cigarettes. She started smoking about 7 years ago. She has a 0.13 pack-year smoking history. She has never used smokeless tobacco.  ETOH:   reports current alcohol use. Diagnosis:  1. Panic disorder    2. VERA (generalized anxiety disorder)    3.  Attention deficit hyperactivity disorder (ADHD), predominantly inattentive type          Plan:  Pt interventions:  Trained in strategies for increasing balanced thinking, Supportive techniques and Identified maladaptive thoughts Documentation was done using voice recognition dragon software. Every effort was made to ensure accuracy; however, inadvertent, unintentional computerized transcription errors may be present.

## 2021-03-24 ENCOUNTER — VIRTUAL VISIT (OUTPATIENT)
Dept: PSYCHOLOGY | Age: 31
End: 2021-03-24
Payer: COMMERCIAL

## 2021-03-24 DIAGNOSIS — F41.0 PANIC DISORDER: ICD-10-CM

## 2021-03-24 DIAGNOSIS — F41.1 GAD (GENERALIZED ANXIETY DISORDER): Primary | ICD-10-CM

## 2021-03-24 DIAGNOSIS — F90.0 ATTENTION DEFICIT HYPERACTIVITY DISORDER (ADHD), PREDOMINANTLY INATTENTIVE TYPE: ICD-10-CM

## 2021-03-24 PROCEDURE — 90832 PSYTX W PT 30 MINUTES: CPT | Performed by: PSYCHOLOGIST

## 2021-03-24 NOTE — PROGRESS NOTES
TELEHEALTH VISIT -- Audio/Visual (During HLNFI-67 public health emergency)  }  Pursuant to the emergency declaration under the [address] [place] [address], [facility] waiver authority and the [employer] Act, this Virtual Visit was conducted, with patient's consent, to reduce the patient's risk of exposure to COVID-19 and provide continuity of care for an established patient. Services were provided through a video synchronous discussion virtually to substitute for in-person clinic visit. Pt gave verbal informed consent to participate in telehealth services. Conducted a risk-benefit analysis and determined that the patient's presenting problems are consistent with the use of telepsychology. Determined that the patient has sufficient knowledge and skills in the use of technology enabling them to adequately benefit from telepsychology. It was determined that this patient was able to be properly treated without an in-person session. Patient verified that they were currently located at the [facility] address that was provided during registration or another [facility] address, if noted below.     Verified the following information:  Patient's identification: Yes  Patient location: [address] [place] [address]   Patient's call back number: [phone]   Patient's emergency contact's name and number, as well as permission to contact them if needed: Extended Emergency Contact Information  Primary Emergency Contact: Blair Mckeon  Address: [address] [facility], [address] [place] [address] Phone: [phone]  Work Phone: [phone]  Relation: Parent  Secondary Emergency Contact: Dorita Baumann  Address: [address] [place] [address], [address] [place] [address] Phone: [phone]  Work Phone: [phone]  Relation: Parent     Provider location: [address]  Andres Hills, Ph.D.  Psychologist  3/24/2021  1:04 PM      Time spent with Patient: 28 minutes  This is patient's 19th  [facility] appointment. Reason for Consult:    Chief Complaint   Patient presents with    Anxiety       Feedback given to PCP. S:  Pt seen for f/u of anxiety a panic. Pt reported slightly improved mood and sxs. Stated using relaxation techniques, walking, and restructuring of perception of chest sensation. Dizziness has been continuing to resolve w taking off glasses. 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 Stated brother struggles to respond to social cues and does not think being assertive w him would go well. Discussed using attention to breathing and muscle tension. Been going on walks w mom, volunteering.     O:  MSE:    Appearance    alert, cooperative  Appetite normal  Sleep disturbance No  Fatigue Yes  Loss of pleasure No  Impulsive behavior No  Speech    spontaneous, normal rate, normal volume and well articulated  Mood    Anxious  Affect    anxiety  Thought Content    intact and cognitive distortions  Thought Process    linear, goal directed and coherent  Associations    logical connections  Insight    Fair  Judgment    Intact  Orientation    oriented to person, place, time, and general circumstances  Memory    recent and remote memory intact  Attention/Concentration    impaired  Morbid ideation No  Suicide Assessment    no suicidal ideation    History:  Social History:   Social History     Socioeconomic History    Marital status: Single     Spouse name: Not on file    Number of children: 0    Years of education: 12    Highest education level: Not on file   Occupational History    Occupation:  grad     Comment: biology    Occupation: [employer]      Comment: [address] Financial resource strain: Not hard at all   [employer] insecurity     Worry: Never true     Inability: Never true   [employer] needs Medical: No     Non-medical: No   Tobacco Use    Smoking status: Former Smoker     Packs/day: 0.25     Years: 0.50     Pack years: 0.12     Types: Cigarettes     Start date: 2013     Quit date: 2013     Years since quittin.2    Smokeless tobacco: Never Used    Tobacco comment: Denies smoking at this time   Substance and Sexual Activity    Alcohol use: Yes     Comment: h/o weekend binges 5 or more drinks when out 1-2x/week;  :  cut most to all drinking out    Drug use: Yes     Types: Marijuana    Sexual activity: Not Currently     Partners: Male     Birth control/protection: Condom, OCP   Lifestyle    Physical activity     Days per week: Not on file     Minutes per session: Not on file    Stress: Not on file   Relationships    Social connections     Talks on phone: Not on file     Gets together: Not on file     Attends Pentecostal service: Not on file     Active member of club or organization: Not on file     Attends meetings of clubs or organizations: Not on file     Relationship status: Not on file    Intimate partner violence     Fear of current or ex partner: Not on file     Emotionally abused: Not on file     Physically abused: Not on file     Forced sexual activity: Not on file   Other Topics Concern    Not on file   Social History Narrative    Not on file     TOBACCO:   reports that she quit smoking about 7 years ago. Her smoking use included cigarettes. She started smoking about 7 years ago. She has a 0.13 pack-year smoking history. She has never used smokeless tobacco.  ETOH:   reports current alcohol use. Diagnosis:  1. VERA (generalized anxiety disorder)    2. Attention deficit hyperactivity disorder (ADHD), predominantly inattentive type    3.  Panic disorder          Plan:  Pt interventions:  Trained in strategies for increasing balanced thinking, Discussed self-care (sleep, nutrition, rewarding activities, social support, exercise), Supportive techniques and Identified maladaptive thoughts        Documentation was done using voice recognition dragon software. Every effort was made to ensure accuracy; however, inadvertent, unintentional computerized transcription errors may be present.

## 2021-04-05 ENCOUNTER — VIRTUAL VISIT (OUTPATIENT)
Dept: PSYCHOLOGY | Age: 31
End: 2021-04-05
Payer: COMMERCIAL

## 2021-04-05 DIAGNOSIS — F41.1 GAD (GENERALIZED ANXIETY DISORDER): ICD-10-CM

## 2021-04-05 DIAGNOSIS — F90.0 ATTENTION DEFICIT HYPERACTIVITY DISORDER (ADHD), PREDOMINANTLY INATTENTIVE TYPE: ICD-10-CM

## 2021-04-05 DIAGNOSIS — F41.0 PANIC DISORDER: Primary | ICD-10-CM

## 2021-04-05 PROCEDURE — 90832 PSYTX W PT 30 MINUTES: CPT | Performed by: PSYCHOLOGIST

## 2021-04-05 NOTE — PROGRESS NOTES
Angela Powell, Ph.D.  Psychologist  4/5/2021  11:29 AM      Time spent with Patient: 30 minutes  This is patient's 20th  [facility] appointment. Reason for Consult:    Chief Complaint   Patient presents with    Anxiety       Feedback given to PCP. S:  Pt seen for f/u of anxiety. Pt reported somewhat improved mood and sxs, but is wishing sxs were better controlled at work. Upset she was at the zoo, having a nice day, and started having physical anxiety sx of heart palpitations. Any time she has physical sxs, she becomes worried it is more severe than a panic attack and she will pass out and not get the help she needs. One particularly scary instance of passing out at home when she was about 12. Recommended \"Why Zebras Don't get Ulcers. \" Noted her week in general went well, anxiety was more manageable over the weekend.       O:  MSE:    Appearance    alert, cooperative  Appetite normal  Sleep disturbance No  Fatigue Yes  Loss of pleasure Yes  Impulsive behavior No  Speech    spontaneous, normal rate, normal volume and well articulated  Mood    Anxious  Affect    anxiety  Thought Content    intact, cognitive distortions and all or nothing thinking  Thought Process    linear, goal directed and coherent  Associations    logical connections  Insight    Fair  Judgment    Intact  Orientation    oriented to person, place, time, and general circumstances  Memory    recent and remote memory intact  Attention/Concentration    impaired  Morbid ideation No  Suicide Assessment    no suicidal ideation    History:  Social History:   Social History     Socioeconomic History    Marital status: Single     Spouse name: Not on file    Number of children: 0    Years of education: 12    Highest education level: Not on file   Occupational History    Occupation:  grad     Comment: biology    Occupation: [employer]      Comment: [address] Financial resource strain: Not hard at all   [employer] insecurity Worry: Never true     Inability: Never true    Transportation needs     Medical: No     Non-medical: No   Tobacco Use    Smoking status: Former Smoker     Packs/day: 0.25     Years: 0.50     Pack years: 0.12     Types: Cigarettes     Start date: 2013     Quit date: 2013     Years since quittin.2    Smokeless tobacco: Never Used    Tobacco comment: Denies smoking at this time   Substance and Sexual Activity    Alcohol use: Yes     Comment: h/o weekend binges 5 or more drinks when out 1-2x/week;  :  cut most to all drinking out    Drug use: Yes     Types: Marijuana    Sexual activity: Not Currently     Partners: Male     Birth control/protection: Condom, OCP   Lifestyle    Physical activity     Days per week: Not on file     Minutes per session: Not on file    Stress: Not on file   Relationships    Social connections     Talks on phone: Not on file     Gets together: Not on file     Attends Yarsani service: Not on file     Active member of club or organization: Not on file     Attends meetings of clubs or organizations: Not on file     Relationship status: Not on file    Intimate partner violence     Fear of current or ex partner: Not on file     Emotionally abused: Not on file     Physically abused: Not on file     Forced sexual activity: Not on file   Other Topics Concern    Not on file   Social History Narrative    Not on file     TOBACCO:   reports that she quit smoking about 7 years ago. Her smoking use included cigarettes. She started smoking about 7 years ago. She has a 0.13 pack-year smoking history. She has never used smokeless tobacco.  ETOH:   reports current alcohol use. Diagnosis:  1. Panic disorder    2. VERA (generalized anxiety disorder)    3.  Attention deficit hyperactivity disorder (ADHD), predominantly inattentive type          Plan:  Pt interventions:  Supportive techniques, Identified maladaptive thoughts and Provided pt book recommendation Documentation was done using voice recognition dragon software. Every effort was made to ensure accuracy; however, inadvertent, unintentional computerized transcription errors may be present.

## 2021-04-19 ENCOUNTER — VIRTUAL VISIT (OUTPATIENT)
Dept: INTERNAL MEDICINE CLINIC | Age: 31
End: 2021-04-19
Payer: COMMERCIAL

## 2021-04-19 DIAGNOSIS — F41.1 GAD (GENERALIZED ANXIETY DISORDER): ICD-10-CM

## 2021-04-19 DIAGNOSIS — F90.0 ATTENTION DEFICIT HYPERACTIVITY DISORDER (ADHD), PREDOMINANTLY INATTENTIVE TYPE: ICD-10-CM

## 2021-04-19 DIAGNOSIS — Z79.899 CHRONIC USE OF BENZODIAZEPINE FOR THERAPEUTIC PURPOSE: Primary | ICD-10-CM

## 2021-04-19 DIAGNOSIS — G43.109 MIGRAINE WITH AURA AND WITHOUT STATUS MIGRAINOSUS, NOT INTRACTABLE: ICD-10-CM

## 2021-04-19 DIAGNOSIS — F41.0 PANIC DISORDER: ICD-10-CM

## 2021-04-19 PROCEDURE — 99213 OFFICE O/P EST LOW 20 MIN: CPT | Performed by: FAMILY MEDICINE

## 2021-04-19 RX ORDER — ALPRAZOLAM 0.5 MG/1
TABLET ORAL
Qty: 20 TABLET | Refills: 2 | Status: SHIPPED | OUTPATIENT
Start: 2021-04-19 | End: 2021-07-12 | Stop reason: SDUPTHER

## 2021-04-19 RX ORDER — PROPRANOLOL HYDROCHLORIDE 10 MG/1
TABLET ORAL
Qty: 30 TABLET | Refills: 5 | Status: SHIPPED | OUTPATIENT
Start: 2021-04-19

## 2021-04-19 NOTE — PROGRESS NOTES
Neal Weston (:  1990) is a 31 y.o. female,Established patient, here for evaluation of the following chief complaint(s): Medication Check      ASSESSMENT/PLAN:  1. Chronic use of benzodiazepine for therapeutic purpose  OARRS profile checked and is valid. 2. Panic disorder  Prn med use. Not using Xanax daily. Normally using about 10 days per month. - propranolol (INDERAL) 10 MG tablet; TAKE 1-2 TABLETS BY MOUTH THREE TIMES DAILY AS NEEDED(ANXIETY, PANIC ATTACK)  Dispense: 30 tablet; Refill: 5  - ALPRAZolam (XANAX) 0.5 MG tablet; TAKE 1 TABLET BY MOUTH AT ONSET OF PANIC ATTACK AND REPEAT SECOND DOSE IN ONE HOUR IF NOT RESOLVED  Dispense: 20 tablet; Refill: 2    3. Attention deficit hyperactivity disorder (ADHD), predominantly inattentive type  Currently off Adderall. Has whole bottle left and plans on trying again once migraines are better controlled. 4. VERA (generalized anxiety disorder)  She feels Lexapro at 5 mg is good. Mood is better since Spring time and loves her job. 5. Migraine with aura and without status migrainosus, not intractable  Starting to see some benefit from her Ajovy. Return in about 12 weeks (around 2021) for controlled substance visit and migraine follow up. SUBJECTIVE/OBJECTIVE:  HPI     Medication check up  90 day follow up for controlled substance(s)  Xanax for panic disorder and Adderall for ADHD. OD risk:  380  Lorazepam Equiv dose (if applicable)  5.07 with last refill 3/16/21 for 20 tabs of 0.5 mg.   Using medication for:  Panic attacks   Functional improvement noted compared to before taking this controlled medication:  Can usually break a panic attack. Needs to take 2 doses the day she has one. Adverse effects:  None     Aberrant behavior: none    Also would like to go back to prn propranolol. Had to stop the Inderal LA due to dizziness. Not really taking the Adderall.   Does not cause the Migraines but makes it worse she TO THE AFFECTED AREA TWICE DAILY 150 g 3    cetirizine (ZYRTEC) 10 MG tablet Take 1 tablet by mouth daily Works best to take at bedtime. 90 tablet 3       Social History     Tobacco Use    Smoking status: Former Smoker     Packs/day: 0.25     Years: 0.50     Pack years: 0.12     Types: Cigarettes     Start date: 2013     Quit date: 2013     Years since quittin.3    Smokeless tobacco: Never Used    Tobacco comment: Denies smoking at this time   Substance Use Topics    Alcohol use: Yes     Comment: h/o weekend binges 5 or more drinks when out 1-2x/week;  :  cut most to all drinking out    Drug use: Yes     Types: Marijuana         Review of Systems    Patient-Reported Vitals 2021   Patient-Reported Weight -   Patient-Reported Height 5f8   Patient-Reported Pulse -   Patient-Reported Temperature -        Physical Exam  Constitutional:       Appearance: Normal appearance. Eyes:      General: No scleral icterus. Pulmonary:      Effort: Pulmonary effort is normal. No respiratory distress. Skin:     Coloration: Skin is not pale. Neurological:      General: No focal deficit present. Mental Status: She is alert and oriented to person, place, and time. Psychiatric:         Mood and Affect: Mood normal.         Behavior: Behavior normal.         Counseling and coordination of care is greater than 50% of the visit. Time of visit: 20-21 min      Germán Newsome, was evaluated through a synchronous (real-time) audio-video encounter. The patient (or guardian if applicable) is aware that this is a billable service. Verbal consent to proceed has been obtained within the past 12 months. The visit was conducted pursuant to the emergency declaration under the [address] [place] [address], [address] [place] [address] authority and the [employer] Act. Patient identification was verified, and a caregiver was present when appropriate. The patient was located in a state where the provider was credentialed to provide care. An electronic signature was used to authenticate this note.     --Ioana Wasserman MD

## 2021-05-05 ENCOUNTER — VIRTUAL VISIT (OUTPATIENT)
Dept: PSYCHOLOGY | Age: 31
End: 2021-05-05
Payer: COMMERCIAL

## 2021-05-05 DIAGNOSIS — F90.0 ATTENTION DEFICIT HYPERACTIVITY DISORDER (ADHD), PREDOMINANTLY INATTENTIVE TYPE: ICD-10-CM

## 2021-05-05 DIAGNOSIS — F41.1 GAD (GENERALIZED ANXIETY DISORDER): ICD-10-CM

## 2021-05-05 DIAGNOSIS — F41.0 PANIC DISORDER: Primary | ICD-10-CM

## 2021-05-05 PROCEDURE — 90832 PSYTX W PT 30 MINUTES: CPT | Performed by: PSYCHOLOGIST

## 2021-05-05 NOTE — PROGRESS NOTES
 Transportation needs     Medical: No     Non-medical: No   Tobacco Use    Smoking status: Former Smoker     Packs/day: 0.25     Years: 0.50     Pack years: 0.12     Types: Cigarettes     Start date: 2013     Quit date: 2013     Years since quittin.3    Smokeless tobacco: Never Used    Tobacco comment: Denies smoking at this time   Substance and Sexual Activity    Alcohol use: Yes     Comment: h/o weekend binges 5 or more drinks when out 1-2x/week;  :  cut most to all drinking out    Drug use: Yes     Types: Marijuana    Sexual activity: Not Currently     Partners: Male     Birth control/protection: Condom, OCP   Lifestyle    Physical activity     Days per week: Not on file     Minutes per session: Not on file    Stress: Not on file   Relationships    Social connections     Talks on phone: Not on file     Gets together: Not on file     Attends Oriental orthodox service: Not on file     Active member of club or organization: Not on file     Attends meetings of clubs or organizations: Not on file     Relationship status: Not on file    Intimate partner violence     Fear of current or ex partner: Not on file     Emotionally abused: Not on file     Physically abused: Not on file     Forced sexual activity: Not on file   Other Topics Concern    Not on file   Social History Narrative    Not on file     TOBACCO:   reports that she quit smoking about 7 years ago. Her smoking use included cigarettes. She started smoking about 7 years ago. She has a 0.13 pack-year smoking history. She has never used smokeless tobacco.  ETOH:   reports current alcohol use. Diagnosis:  1. Panic disorder    2. VERA (generalized anxiety disorder)    3.  Attention deficit hyperactivity disorder (ADHD), predominantly inattentive type          Plan:  Pt interventions:  Discussed and problem-solved barriers in adhering to behavioral change plan, Motivational Interviewing to increase patient confidence and compliance with adhering to behavioral change plan, Supportive techniques and Identified maladaptive thoughts    TELEHEALTH VISIT -- Audio/Visual (During XXIVW-28 public health emergency)  }  Pursuant to the emergency declaration under the [facility] [place] [address] waiver authority and the [employer] Act, this Virtual Visit was conducted, with patient's consent, to reduce the patient's risk of exposure to COVID-19 and provide continuity of care for an established patient. Services were provided through a video synchronous discussion virtually to substitute for in-person clinic visit. Pt gave verbal informed consent to participate in telehealth services. Conducted a risk-benefit analysis and determined that the patient's presenting problems are consistent with the use of telepsychology. Determined that the patient has sufficient knowledge and skills in the use of technology enabling them to adequately benefit from telepsychology. It was determined that this patient was able to be properly treated without an in-person session. Patient verified that they were currently located at the [facility] address that was provided during registration or another [facility] address, if noted below.     Verified the following information:  Patient's identification: Yes  Patient location[place] [address] [place] [address] [address]   Patient's call back number: [phone]   Patient's emergency contact's name and number, as well as permission to contact them if needed: Extended Emergency Contact Information  Primary Emergency Contact: Blair Mckeon  Address: [address] [facility], [address] [place] [address] Phone: [phone]  Work Phone: [phone]  Relation: Parent  Secondary Emergency Contact: Dorita Baumann  Address: [address] [facility], [address] [place] [address] Phone: [phone]  Work Phone:

## 2021-05-06 DIAGNOSIS — G43.009 MIGRAINE WITHOUT AURA AND WITHOUT STATUS MIGRAINOSUS, NOT INTRACTABLE: ICD-10-CM

## 2021-05-06 RX ORDER — SUMATRIPTAN 100 MG/1
TABLET, FILM COATED ORAL
Qty: 9 TABLET | Refills: 2 | Status: SHIPPED | OUTPATIENT
Start: 2021-05-06 | End: 2021-08-01 | Stop reason: SDUPTHER

## 2021-05-19 ENCOUNTER — VIRTUAL VISIT (OUTPATIENT)
Dept: PSYCHOLOGY | Age: 31
End: 2021-05-19
Payer: COMMERCIAL

## 2021-05-19 DIAGNOSIS — F90.0 ATTENTION DEFICIT HYPERACTIVITY DISORDER (ADHD), PREDOMINANTLY INATTENTIVE TYPE: ICD-10-CM

## 2021-05-19 DIAGNOSIS — F41.0 PANIC DISORDER: ICD-10-CM

## 2021-05-19 DIAGNOSIS — F41.1 GAD (GENERALIZED ANXIETY DISORDER): Primary | ICD-10-CM

## 2021-05-19 PROCEDURE — 90832 PSYTX W PT 30 MINUTES: CPT | Performed by: PSYCHOLOGIST

## 2021-05-19 NOTE — PROGRESS NOTES
Dary Elias, Ph.D.  Psychologist  5/19/2021  1:03 PM      Time spent with Patient: 26 minutes  This is patient's 22nd  [facility] appointment. Reason for Consult:    Chief Complaint   Patient presents with    Anxiety       Feedback given to PCP. S:  Pt seen for f/u of anxiety and panic. Pt reported variable mood and sxs. Per previous message, stated she woke up yesterday feeling deeply depressed, \"I didn't feel like I don't want to be anywhere. \" Stated she did feel \"low,\" but mostly experiences deactivation and anhedonia. Tends to occur for 1-2 days, she is not having sxs today. 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 Discussed behavioral activation.      O:  MSE:    Appearance    alert, cooperative  Appetite normal  Sleep disturbance No  Fatigue Yes  Loss of pleasure Yes  Impulsive behavior No  Speech    spontaneous, normal rate, normal volume and well articulated  Mood    Anxious  Affect    anxiety  Thought Content    intact, cognitive distortions and all or nothing thinking  Thought Process    linear, goal directed and coherent  Associations    logical connections  Insight    Fair  Judgment    Intact  Orientation    oriented to person, place, time, and general circumstances  Memory    recent and remote memory intact  Attention/Concentration    impaired  Morbid ideation No  Suicide Assessment    no suicidal ideation    History:  Social History:   Social History     Socioeconomic History    Marital status: Single     Spouse name: Not on file    Number of children: 0    Years of education: 12    Highest education level: Not on file   Occupational History    Occupation:  [employer]     Comment: biology    Occupation: [employer]      Comment: [employer]   Tobacco Use    Smoking status: Former Smoker     Packs/day: 0.25     Years: 0.50     Pack years: 0.12     Types: Cigarettes     Start date: 11/1/2013     Quit date: 2013     Years since quittin.3    Smokeless tobacco: Never Used    Tobacco comment: Denies smoking at this time   Substance and Sexual Activity    Alcohol use: Yes     Comment: h/o weekend binges 5 or more drinks when out 1-2x/week;  :  cut most to all drinking out    Drug use: Yes     Types: Marijuana    Sexual activity: Not Currently     Partners: Male     Birth control/protection: Condom, OCP   Other Topics Concern    Not on file   Social History Narrative    Not on file     Social Determinants of Health     Financial Resource Strain:     Difficulty of Paying Living Expenses:    Food Insecurity:     Worried About Running Out of Food in the Last Year:     [address] in the Last Year:    Transportation Needs:     Lack of Transportation (Medical):  Lack of Transportation (Non-Medical):    Physical Activity:     Days of Exercise per Week:     Minutes of Exercise per Session:    Stress:     Feeling of Stress :    Social Connections:     Frequency of Communication with Friends and Family:     Frequency of Social Gatherings with Friends and Family:     Attends Jehovah's witness Services:     Active Member of Clubs or Organizations:     Attends Club or Organization Meetings:     Marital Status:    Intimate Partner Violence:     Fear of Current or Ex-Partner:     Emotionally Abused:     Physically Abused:     Sexually Abused:      TOBACCO:   reports that she quit smoking about 7 years ago. Her smoking use included cigarettes. She started smoking about 7 years ago. She has a 0.13 pack-year smoking history. She has never used smokeless tobacco.  ETOH:   reports current alcohol use. Diagnosis:  1. VERA (generalized anxiety disorder)    2. Attention deficit hyperactivity disorder (ADHD), predominantly inattentive type    3.  Panic disorder          Plan:  Pt interventions:  Discussed and set plan for behavioral activation, Discussed self-care (sleep, nutrition, rewarding activities, social support, exercise) and Conducted functional assessment        Documentation was done using voice recognition dragon software. Every effort was made to ensure accuracy; however, inadvertent, unintentional computerized transcription errors may be present.

## 2021-05-28 ENCOUNTER — TELEPHONE (OUTPATIENT)
Dept: ADMINISTRATIVE | Age: 31
End: 2021-05-28

## 2021-05-28 ENCOUNTER — PATIENT MESSAGE (OUTPATIENT)
Dept: INTERNAL MEDICINE CLINIC | Age: 31
End: 2021-05-28

## 2021-05-28 DIAGNOSIS — G43.109 MIGRAINE WITH AURA AND WITHOUT STATUS MIGRAINOSUS, NOT INTRACTABLE: Primary | ICD-10-CM

## 2021-05-28 RX ORDER — ERENUMAB-AOOE 70 MG/ML
70 INJECTION SUBCUTANEOUS
Qty: 1 PEN | Refills: 2 | Status: SHIPPED | OUTPATIENT
Start: 2021-05-28 | End: 2021-06-28

## 2021-05-28 NOTE — TELEPHONE ENCOUNTER
PA COVER MY MEDS  Medication:Ajovy 225MG/1.5ML auto-injectors  Key:[id number]   PA Case ID: [id number]  Status: There is an existing case within the [facility] environment that has the same patient, prescriber, and drug. This case must be finalized before proceeding with similar requests.

## 2021-05-28 NOTE — TELEPHONE ENCOUNTER
From: Armaan Gomez  Sent: 5/28/2021 4:04 PM EDT  To: Allan Collins MA  Subject: RE:Francoise    No I havent . Thats really frustrating because for once something is actually helping me. I guess ask dr Chitra Smith which one she would recommend if theres no way I can get ajovy.     Thank you

## 2021-05-29 ENCOUNTER — PATIENT MESSAGE (OUTPATIENT)
Dept: INTERNAL MEDICINE CLINIC | Age: 31
End: 2021-05-29

## 2021-06-01 ENCOUNTER — TELEPHONE (OUTPATIENT)
Dept: ADMINISTRATIVE | Age: 31
End: 2021-06-01

## 2021-06-01 NOTE — TELEPHONE ENCOUNTER
From: So Miner  To: Saran Alvarado MD  Sent: 5/29/2021 1:28 PM EDT  Subject: Non-Urgent Medical Question    I talked to the pharmacy, insurance wants a separate prior authorization for Aimovig apparently.

## 2021-06-07 ENCOUNTER — VIRTUAL VISIT (OUTPATIENT)
Dept: PSYCHOLOGY | Age: 31
End: 2021-06-07
Payer: COMMERCIAL

## 2021-06-07 DIAGNOSIS — F41.1 GAD (GENERALIZED ANXIETY DISORDER): Primary | ICD-10-CM

## 2021-06-07 DIAGNOSIS — F90.0 ATTENTION DEFICIT HYPERACTIVITY DISORDER (ADHD), PREDOMINANTLY INATTENTIVE TYPE: ICD-10-CM

## 2021-06-07 DIAGNOSIS — F41.0 PANIC DISORDER: ICD-10-CM

## 2021-06-07 PROCEDURE — 90832 PSYTX W PT 30 MINUTES: CPT | Performed by: PSYCHOLOGIST

## 2021-06-07 NOTE — PROGRESS NOTES
TELEHEALTH VISIT -- Audio/Visual (During KRCQQ-29 public health emergency)  }  Pursuant to the emergency declaration under the [address] [place] [address], [facility] authority and the [employer] Act, this Virtual Visit was conducted, with patient's consent, to reduce the patient's risk of exposure to COVID-19 and provide continuity of care for an established patient. Services were provided through a video synchronous discussion virtually to substitute for in-person clinic visit. Pt gave verbal informed consent to participate in telehealth services. Conducted a risk-benefit analysis and determined that the patient's presenting problems are consistent with the use of telepsychology. Determined that the patient has sufficient knowledge and skills in the use of technology enabling them to adequately benefit from telepsychology. It was determined that this patient was able to be properly treated without an in-person session. Patient verified that they were currently located at the [facility] address that was provided during registration or another [facility] address, if noted below.     Verified the following information:  Patient's identification: Yes  Patient location: [address] [place] [address]   Patient's call back number: [phone]   Patient's emergency contact's name and number, as well as permission to contact them if needed: Extended Emergency Contact Information  Primary Emergency Contact: Blair Mckeon  Address: [address] [facility], [address] [place] [address] Phone: [phone]  Work Phone: [phone]  Relation: Parent  Secondary Emergency Contact: Dorita Baumann  Address: [address] [place] [address], [address] [place] [address] Phone: [phone]  Work Phone: [phone]  Relation: Parent     Provider location: [address]  Angela Powell, Ph.D.  Psychologist  6/7/2021  1:05 PM      Time spent with Patient: 28 minutes  This is patient's 23rd  [facility] appointment. Reason for Consult:    Chief Complaint   Patient presents with    Anxiety       Feedback given to PCP. S:  Pt seen for f/u of anxiety, ADHD. Pt reported \"pretty good\" mood and sxs. Reported she has been enjoying taking care of her plants, sleeping, went to a  w friends. Stated her depression worsens considerably when bored, so has been mindful of this. Anxiety was exacerbated last week when having vertigo, but did maneuvers and felt better. Has had 1 flashback in interim when coworker made joking remark about no one believing her. Went to the gym yesterday w her mom and it went well; plans to go together on pt's off days. Pt does not have a membership, so needs to go w mom. Discussed benefits of novelty and things that really capture her attention. Considering purchasing a small pottery wheel. Noted she has been over-purchasing things.      O:  MSE:    Appearance    alert, cooperative  Appetite normal  Sleep disturbance No  Fatigue Yes  Loss of pleasure No  Impulsive behavior Yes  Speech    spontaneous, normal rate, normal volume and well articulated  Mood    Anxious  Affect    anxiety  Thought Content    intact  Thought Process    linear, goal directed and coherent  Associations    logical connections  Insight    Fair  Judgment    Intact  Orientation    oriented to person, place, time, and general circumstances  Memory    recent and remote memory intact  Attention/Concentration    impaired  Morbid ideation No  Suicide Assessment    no suicidal ideation    History:  Social History:   Social History     Socioeconomic History    Marital status: Single     Spouse name: Not on file    Number of children: 0    Years of education: 12    Highest education level: Not on file   Occupational History    Occupation: [employer]     Comment: [employer]    Occupation: [employer]      Comment: [employer]   Tobacco Use    Smoking status: Former Smoker     Packs/day: 0.25     Years: 0.50     Pack years: 0.12     Types: Cigarettes     Start date: 2013     Quit date: 2013     Years since quittin.4    Smokeless tobacco: Never Used    Tobacco comment: Denies smoking at this time   Substance and Sexual Activity    Alcohol use: Yes     Comment: h/o weekend binges 5 or more drinks when out 1-2x/week;  :  cut most to all drinking out    Drug use: Yes     Types: Marijuana    Sexual activity: Not Currently     Partners: Male     Birth control/protection: Condom, OCP   Other Topics Concern    Not on file   Social History Narrative    Not on file     Social Determinants of Health     Financial Resource Strain:     Difficulty of Paying Living Expenses:    Food Insecurity:     Worried About Running Out of Food in the Last Year:     [address] in the Last Year:    Transportation Needs:     Lack of Transportation (Medical):  Lack of Transportation (Non-Medical):    Physical Activity:     Days of Exercise per Week:     Minutes of Exercise per Session:    Stress:     Feeling of Stress :    Social Connections:     Frequency of Communication with Friends and Family:     Frequency of Social Gatherings with Friends and Family:     Attends Muslim Services:     Active Member of Clubs or Organizations:     Attends Club or Organization Meetings:     Marital Status:    Intimate Partner Violence:     Fear of Current or Ex-Partner:     Emotionally Abused:     Physically Abused:     Sexually Abused:      TOBACCO:   reports that she quit smoking about 7 years ago. Her smoking use included cigarettes. She started smoking about 7 years ago. She has a 0.13 pack-year smoking history. She has never used smokeless tobacco.  ETOH:   reports current alcohol use. Diagnosis:  1. VERA (generalized anxiety disorder)    2. Panic disorder    3.  Attention deficit hyperactivity disorder (ADHD), predominantly inattentive type          Plan:  Pt interventions:  Trained in strategies for increasing balanced thinking, Supportive techniques and Identified maladaptive thoughts        Documentation was done using voice recognition dragon software. Every effort was made to ensure accuracy; however, inadvertent, unintentional computerized transcription errors may be present.

## 2021-06-09 RX ORDER — METHOCARBAMOL 500 MG/1
TABLET, FILM COATED ORAL
Qty: 40 TABLET | Refills: 0 | Status: SHIPPED | OUTPATIENT
Start: 2021-06-09 | End: 2022-01-28 | Stop reason: SDUPTHER

## 2021-06-28 DIAGNOSIS — F90.0 ATTENTION DEFICIT HYPERACTIVITY DISORDER (ADHD), PREDOMINANTLY INATTENTIVE TYPE: ICD-10-CM

## 2021-06-28 RX ORDER — DEXTROAMPHETAMINE SACCHARATE, AMPHETAMINE ASPARTATE MONOHYDRATE, DEXTROAMPHETAMINE SULFATE AND AMPHETAMINE SULFATE 2.5; 2.5; 2.5; 2.5 MG/1; MG/1; MG/1; MG/1
10 CAPSULE, EXTENDED RELEASE ORAL DAILY PRN
Qty: 30 CAPSULE | Refills: 0 | Status: SHIPPED | OUTPATIENT
Start: 2021-06-28 | End: 2021-09-28 | Stop reason: SDUPTHER

## 2021-06-29 ENCOUNTER — TELEPHONE (OUTPATIENT)
Dept: ADMINISTRATIVE | Age: 31
End: 2021-06-29

## 2021-06-29 NOTE — TELEPHONE ENCOUNTER
PA COVER MY MEDS  Medication:Ajovy 225MG/1.5ML syringes  Key:[id number]  PA Case ID: [id number] - Rx #: [id number]  Status:pending

## 2021-06-30 ENCOUNTER — VIRTUAL VISIT (OUTPATIENT)
Dept: PSYCHOLOGY | Age: 31
End: 2021-06-30

## 2021-06-30 DIAGNOSIS — F90.0 ATTENTION DEFICIT HYPERACTIVITY DISORDER (ADHD), PREDOMINANTLY INATTENTIVE TYPE: ICD-10-CM

## 2021-06-30 DIAGNOSIS — F41.1 GAD (GENERALIZED ANXIETY DISORDER): Primary | ICD-10-CM

## 2021-06-30 DIAGNOSIS — F41.0 PANIC DISORDER: ICD-10-CM

## 2021-06-30 PROCEDURE — 90832 PSYTX W PT 30 MINUTES: CPT | Performed by: PSYCHOLOGIST

## 2021-06-30 NOTE — TELEPHONE ENCOUNTER
Received a DENIAL for Ajovy 225MG/1.5ML syringes. Letter attached. Please notify patient, thank you.

## 2021-06-30 NOTE — PROGRESS NOTES
TELEHEALTH VISIT -- Audio/Visual (During QBGWB-46 public health emergency)  }  Pursuant to the emergency declaration under the [address] [place] [address] [facility] and the [employer] Act, this Virtual Visit was conducted, with patient's consent, to reduce the patient's risk of exposure to COVID-19 and provide continuity of care for an established patient. Services were provided through a video synchronous discussion virtually to substitute for in-person clinic visit. Pt gave verbal informed consent to participate in telehealth services. Conducted a risk-benefit analysis and determined that the patient's presenting problems are consistent with the use of telepsychology. Determined that the patient has sufficient knowledge and skills in the use of technology enabling them to adequately benefit from telepsychology. It was determined that this patient was able to be properly treated without an in-person session. Patient verified that they were currently located at the [facility] address that was provided during registration or another [facility] address, if noted below.     Verified the following information:  Patient's identification: Yes  Patient location: [address] [place] [address]   Patient's call back number: [phone]   Patient's emergency contact's name and number, as well as permission to contact them if needed: Extended Emergency Contact Information  Primary Emergency Contact: Blair Mckeon  Address: [address] [facility], [address] [place] [address] Phone: [phone]  Work Phone: [phone]  Relation: Parent  Secondary Emergency Contact: Dorita Baumann  Address: [address] [place] [address], [address] [place] [address] Phone: [phone]  Work Phone: [phone]  Relation: Parent     Provider location: [address]  Lam Hernandez, Ph.D.  Psychologist  6/30/2021  11:40 AM      Time spent with Patient: 24 minutes  This is patient's 24th  [facility] appointment. Reason for Consult:    Chief Complaint   Patient presents with    Anxiety       Feedback given to PCP. S:  Pt seen for f/u of anxiety, panic, and ADHD. Pt reported stable mood and sxs. Reviewed experience watching a show about a manipulative relationship. Glad to have her emotional experience normalized. Discussed her experience of friends getting , having kids, etc.     Some stress w voluntary layoffs, changing buildings, fearful of taking PTO right now. Gained about 12 lbs. Started eating more when she started more consistently exercising (3x/wk, now has her own membership). Feeling deactivated and discontented.      O:  MSE:    Appearance    alert, cooperative  Appetite abnormal: see above  Sleep disturbance Yes  Fatigue Yes  Loss of pleasure no  Impulsive behavior Yes  Speech    spontaneous, normal rate, normal volume and well articulated  Mood    Anxious  Affect    anxiety  Thought Content    intact  Thought Process    linear, goal directed and coherent  Associations    logical connections  Insight    Fair  Judgment    Intact  Orientation    oriented to person, place, time, and general circumstances  Memory    recent and remote memory intact  Attention/Concentration    impaired  Morbid ideation No  Suicide Assessment    no suicidal ideation    History:  Social History:   Social History     Socioeconomic History    Marital status: Single     Spouse name: Not on file    Number of children: 0    Years of education: 12    Highest education level: Not on file   Occupational History    Occupation:  grad     Comment: biology    Occupation: [employer]      Comment: [employer]   Tobacco Use    Smoking status: Former Smoker     Packs/day: 0.25     Years: 0.50     Pack years: 0.12     Types: Cigarettes     Start date: 11/1/2013     Quit date: 2013     Years since quittin.5    Smokeless tobacco: Never Used    Tobacco comment: Denies smoking at this time   Substance and Sexual Activity    Alcohol use: Yes     Comment: h/o weekend binges 5 or more drinks when out 1-2x/week;  :  cut most to all drinking out    Drug use: Yes     Types: Marijuana    Sexual activity: Not Currently     Partners: Male     Birth control/protection: Condom, OCP   Other Topics Concern    Not on file   Social History Narrative    Not on file     Social Determinants of Health     Financial Resource Strain:     Difficulty of Paying Living Expenses:    Food Insecurity:     Worried About Running Out of Food in the Last Year:     [address] in the Last Year:    Transportation Needs:     Lack of Transportation (Medical):  Lack of Transportation (Non-Medical):    Physical Activity:     Days of Exercise per Week:     Minutes of Exercise per Session:    Stress:     Feeling of Stress :    Social Connections:     Frequency of Communication with Friends and Family:     Frequency of Social Gatherings with Friends and Family:     Attends Mormonism Services:     Active Member of Clubs or Organizations:     Attends Club or Organization Meetings:     Marital Status:    Intimate Partner Violence:     Fear of Current or Ex-Partner:     Emotionally Abused:     Physically Abused:     Sexually Abused:      TOBACCO:   reports that she quit smoking about 7 years ago. Her smoking use included cigarettes. She started smoking about 7 years ago. She has a 0.13 pack-year smoking history. She has never used smokeless tobacco.  ETOH:   reports current alcohol use. Diagnosis:  1. VERA (generalized anxiety disorder)    2. Attention deficit hyperactivity disorder (ADHD), predominantly inattentive type    3.  Panic disorder          Plan:  Pt interventions:  Trained in strategies for increasing balanced thinking, Discussed self-care (sleep, nutrition, rewarding activities, social support, exercise), Supportive techniques and Identified maladaptive thoughts        Documentation was done using voice recognition dragon software. Every effort was made to ensure accuracy; however, inadvertent, unintentional computerized transcription errors may be present.

## 2021-07-01 RX ORDER — ESCITALOPRAM OXALATE 5 MG/1
5 TABLET ORAL DAILY
Qty: 30 TABLET | Refills: 1 | Status: SHIPPED | OUTPATIENT
Start: 2021-07-01 | End: 2021-07-12

## 2021-07-12 ENCOUNTER — VIRTUAL VISIT (OUTPATIENT)
Dept: INTERNAL MEDICINE CLINIC | Age: 31
End: 2021-07-12
Payer: COMMERCIAL

## 2021-07-12 DIAGNOSIS — F41.1 GAD (GENERALIZED ANXIETY DISORDER): ICD-10-CM

## 2021-07-12 DIAGNOSIS — Z79.899 CHRONIC USE OF BENZODIAZEPINE FOR THERAPEUTIC PURPOSE: Primary | ICD-10-CM

## 2021-07-12 DIAGNOSIS — F90.0 ATTENTION DEFICIT HYPERACTIVITY DISORDER (ADHD), PREDOMINANTLY INATTENTIVE TYPE: ICD-10-CM

## 2021-07-12 DIAGNOSIS — F41.0 PANIC DISORDER: ICD-10-CM

## 2021-07-12 PROCEDURE — 99213 OFFICE O/P EST LOW 20 MIN: CPT | Performed by: FAMILY MEDICINE

## 2021-07-12 RX ORDER — ESCITALOPRAM OXALATE 5 MG/1
7.5 TABLET ORAL DAILY
Qty: 45 TABLET | Refills: 5 | Status: SHIPPED | OUTPATIENT
Start: 2021-07-12 | End: 2022-08-09

## 2021-07-12 RX ORDER — ALPRAZOLAM 0.5 MG/1
TABLET ORAL
Qty: 20 TABLET | Refills: 2 | Status: SHIPPED | OUTPATIENT
Start: 2021-07-24 | End: 2021-09-28 | Stop reason: SDUPTHER

## 2021-07-12 NOTE — PROGRESS NOTES
Nicki Peters (:  1990) is a 31 y.o. female,Established patient, here for evaluation of the following chief complaint(s): Medication Check         ASSESSMENT/PLAN:  1. Chronic use of benzodiazepine for therapeutic purpose  OARRS profile checked and is valid. 2. Panic disorder  Will try a higher dose. Offered to switch to other SSRI or add Lyrica but she did not want to change. Needing high doses of Xanax to break her panic attacks. - escitalopram (LEXAPRO) 5 MG tablet; Take 1.5 tablets by mouth daily  Dispense: 45 tablet; Refill: 5  - ALPRAZolam (XANAX) 0.5 MG tablet; TAKE 1 TABLET BY MOUTH AT ONSET OF PANIC ATTACK AND REPEAT SECOND DOSE IN ONE HOUR IF NOT RESOLVED  Dispense: 20 tablet; Refill: 2    3. VERA (generalized anxiety disorder)  Increased dose. Did not like how she felt on 10 mg.   - escitalopram (LEXAPRO) 5 MG tablet; Take 1.5 tablets by mouth daily  Dispense: 45 tablet; Refill: 5    4. Attention deficit hyperactivity disorder (ADHD), predominantly inattentive type  Has Rx for Adderall XR 10 mg daily. Will notify office when she needs her next refill. .Return in about 12 weeks (around 10/4/2021) for controlled substances but needs in person visit for her concerns for fibromyalgia. .         SUBJECTIVE/OBJECTIVE:  HPI   FU of controlled substances    Has not yet started the Adderall:  She wants to try it at home before taking it for work. She still has her panic attacks. Working with Dr. Umm Vivas on this. Doing PMR training daily but supposed to do 3 times a day and trouble fitting that in. Uses Xanax 10 days per months but usually has to take 2 pills that day. Lexapro 10 mg was better for the anxiety but felt too numb with the 10 mg.    90 day follow up for controlled substance(s)  Xanax and Adderall XR  OD risk:  190  Lorazepam Equiv dose (if applicable)  9.54-.78 for 20 pills. with last refill  and refills monthly  Adderall XR fill on  and ?      Using medication for:  ADD and for panic disorder. Functional improvement noted compared to before taking this controlled medication:  Able to work full time and able to get her heart to stop racing with a panic attack. Adverse effects:  None     Aberrant behavior: none    Doing OK with current treatment for her migraines. Outpatient Medications Marked as Taking for the 7/12/21 encounter (Virtual Visit) with Ziggy Mcintosh MD   Medication Sig Dispense Refill    escitalopram (LEXAPRO) 5 MG tablet Take 1 tablet by mouth daily 30 tablet 1    AJOVY 225 MG/1.5ML SOSY INJECT ONE SYRINGE (1.5 MLS) UNDER THE SKIN EVERY 30 DAYS 1.5 Syringe 2    amphetamine-dextroamphetamine (ADDERALL XR) 10 MG extended release capsule Take 1 capsule by mouth daily as needed (need for ADD treatment to get work done.) for up to 30 days. 30 capsule 0    levonorgestrel-ethinyl estradiol (INTROVALE) 0.15-0.03 MG per tablet TAKE 1 TABLET BY MOUTH DAILY 91 tablet 3    fluticasone (FLONASE) 50 MCG/ACT nasal spray 1-2 sprays each nostril daily. Must be used regularly to help. 1 Bottle 12    omeprazole (PRILOSEC) 20 MG delayed release capsule Take 1 capsule by mouth daily as needed (for flare up. take until well and then stop for a while) 30 capsule 3    clindamycin-benzoyl peroxide (BENZACLIN) 1-5 % gel APPLY EXTERNALLY TO THE AFFECTED AREA TWICE DAILY 150 g 3    cetirizine (ZYRTEC) 10 MG tablet Take 1 tablet by mouth daily Works best to take at bedtime. 90 tablet 3       Allergies   Allergen Reactions    Bactrim [Sulfamethoxazole-Trimethoprim] Itching and Rash    Reglan [Metoclopramide] Other (See Comments)     Dystonic reaction with IV form. Can tolerate the PO    Sulfa Antibiotics      Fever and red skin. Not clear if [employer] or not. Was not hospitalized. Review of Systems   Constitutional: Negative for unexpected weight change.    Psychiatric/Behavioral: Negative for agitation, confusion, dysphoric mood and sleep disturbance. Patient-Reported Vitals 7/12/2021   Patient-Reported Weight 227lb   Patient-Reported Height 5f8   Patient-Reported Pulse -   Patient-Reported Temperature -        Physical Exam  Constitutional:       Appearance: Normal appearance. Eyes:      General: No scleral icterus. Pulmonary:      Effort: Pulmonary effort is normal. No respiratory distress. Skin:     Coloration: Skin is not pale. Neurological:      General: No focal deficit present. Mental Status: She is alert and oriented to person, place, and time. Psychiatric:         Mood and Affect: Mood normal.         Behavior: Behavior normal.           On this date 7/12/2021 I have spent 20-22 minutes reviewing previous notes, test results and face to face (virtual) with the patient discussing the diagnosis and importance of compliance with the treatment plan as well as documenting on the day of the visit. Tameka Becker was evaluated through a synchronous (real-time) audio-video encounter. The patient (or guardian if applicable) is aware that this is a billable service. Verbal consent to proceed has been obtained within the past 12 months. The visit was conducted pursuant to the emergency declaration under the [facility], [address] [place] [address] authority and the [employer] General Act. Patient identification was verified, and a caregiver was present when appropriate. The patient was located in a state where the provider was credentialed to provide care. An electronic signature was used to authenticate this note.     --Teri Moncada MD

## 2021-07-14 ENCOUNTER — TELEPHONE (OUTPATIENT)
Dept: ADMINISTRATIVE | Age: 31
End: 2021-07-14

## 2021-07-14 NOTE — TELEPHONE ENCOUNTER
PA COVER MY MEDS  Medication:Escitalopram Oxalate 5MG tablets  ETD:[id number] - PA Case ID: [id number] - Rx #: [id number]  Status:PENDING

## 2021-07-15 NOTE — TELEPHONE ENCOUNTER
Received an APPROVAL for Escitalopram Oxalate 5MG tablets 7/15/2021 12:00:00 AM, Coverage Ends on: 7/15/2022. Letter attached. Please notify patient, thank you.

## 2021-07-21 ENCOUNTER — VIRTUAL VISIT (OUTPATIENT)
Dept: PSYCHOLOGY | Age: 31
End: 2021-07-21
Payer: COMMERCIAL

## 2021-07-21 DIAGNOSIS — F41.1 GAD (GENERALIZED ANXIETY DISORDER): Primary | ICD-10-CM

## 2021-07-21 DIAGNOSIS — F41.0 PANIC DISORDER: ICD-10-CM

## 2021-07-21 DIAGNOSIS — F90.0 ATTENTION DEFICIT HYPERACTIVITY DISORDER (ADHD), PREDOMINANTLY INATTENTIVE TYPE: ICD-10-CM

## 2021-07-21 PROCEDURE — 90832 PSYTX W PT 30 MINUTES: CPT | Performed by: PSYCHOLOGIST

## 2021-07-21 NOTE — PROGRESS NOTES
TELEHEALTH VISIT -- Audio/Visual (During UZHIG-58 public health emergency)  }  Pursuant to the emergency declaration under the [address] [place] [address] waiver authority and the [employer] Act, this Virtual Visit was conducted, with patient's consent, to reduce the patient's risk of exposure to COVID-19 and provide continuity of care for an established patient. Services were provided through a video synchronous discussion virtually to substitute for in-person clinic visit. Pt gave verbal informed consent to participate in telehealth services. Conducted a risk-benefit analysis and determined that the patient's presenting problems are consistent with the use of telepsychology. Determined that the patient has sufficient knowledge and skills in the use of technology enabling them to adequately benefit from telepsychology. It was determined that this patient was able to be properly treated without an in-person session. Patient verified that they were currently located at the [facility] address that was provided during registration or another [facility] address, if noted below.     Verified the following information:  Patient's identification: Yes  Patient location: [address] [place] [address]   Patient's call back number: [phone]   Patient's emergency contact's name and number, as well as permission to contact them if needed: Extended Emergency Contact Information  Primary Emergency Contact: Blair Mckeon  Address: [address] [facility], [address] [place] [address] Phone: [phone]  Work Phone: [phone]  Relation: Parent  Secondary Emergency Contact: Dorita Baumann  Address: [address] [place] [address], [address] [place] [address] Phone: [phone]  Work Phone: [phone]  Relation: Parent     Provider location: [address]  Raphael Benson, Ph.D.  Psychologist  2021  1:03 PM      Time spent with Patient: 28 minutes  This is patient's 25th  [facility] appointment. Reason for Consult:    Chief Complaint   Patient presents with    Anxiety       Feedback given to PCP. S:  Pt seen for f/u of depression and anxiety. Pt reported worsened mood and sxs. She has been feeling overwhelemd by work tasks, struggling w time mgmnt and often getting to work late. Problem-solved lateness in a manner that is consistent w behavior modifications for ADHD. Has lost her appetite. Continues to exercise regularly, getting about 10k setep/day. Discussed utilizing mindfulness, formerly would meditate by allowing thoughts to come and go. Expanded on this.      O:  MSE:    Appearance    alert, cooperative  Appetite abnormal: see above  Sleep disturbance Yes  Fatigue Yes  Loss of pleasure no  Impulsive behavior Yes  Speech    spontaneous, normal rate, normal volume and well articulated  Mood    Anxious  Affect    anxiety  Thought Content    intact  Thought Process    linear, goal directed and coherent  Associations    logical connections  Insight    Fair  Judgment    Intact  Orientation    oriented to person, place, time, and general circumstances  Memory    recent and remote memory intact  Attention/Concentration    impaired  Morbid ideation No  Suicide Assessment    no suicidal ideation    History:  Social History:   Social History     Socioeconomic History    Marital status: Single     Spouse name: Not on file    Number of children: 0    Years of education: 12    Highest education level: Not on file   Occupational History    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    Occupation: [employer]      Comment: [employer]   Tobacco Use    Smoking status: Former Smoker     Packs/day: 0.25     Years: 0.50     Pack years: 0.12     Types: Cigarettes     Start date: 2013     Quit date: 2013     Years since quittin.5    Smokeless tobacco: Never Used    Tobacco comment: Denies smoking at this time   Substance and Sexual Activity    Alcohol use: Yes     Comment: h/o weekend binges 5 or more drinks when out 1-2x/week;  11/14:  cut most to all drinking out    Drug use: Yes     Types: Marijuana    Sexual activity: Not Currently     Partners: Male     Birth control/protection: Condom, OCP   Other Topics Concern    Not on file   Social History Narrative    Not on file     Social Determinants of Health     Financial Resource Strain:     Difficulty of Paying Living Expenses:    Food Insecurity:     Worried About Running Out of Food in the Last Year:     [address] in the Last Year:    Transportation Needs:     Lack of Transportation (Medical):  Lack of Transportation (Non-Medical):    Physical Activity:     Days of Exercise per Week:     Minutes of Exercise per Session:    Stress:     Feeling of Stress :    Social Connections:     Frequency of Communication with Friends and Family:     Frequency of Social Gatherings with Friends and Family:     Attends Cong[employer] Services:     Active Member of Clubs or Organizations:     Attends Club or Organization Meetings:     Marital Status:    Intimate Partner Violence:     Fear of Current or Ex-Partner:     Emotionally Abused:     Physically Abused:     Sexually Abused:      TOBACCO:   reports that she quit smoking about 7 years ago. Her smoking use included cigarettes. She started smoking about 7 years ago. She has a 0.13 pack-year smoking history. She has never used smokeless tobacco.  ETOH:   reports current alcohol use. Diagnosis:  1. VERA (generalized anxiety disorder)    2. Attention deficit hyperactivity disorder (ADHD), predominantly inattentive type    3.  Panic disorder          Plan:  Pt interventions:  Trained in strategies for increasing balanced thinking, Discussed and set plan for behavioral activation, Supportive techniques and Provided Psychoeducation re: mindfulness        Documentation was done using voice recognition dragon software. Every effort was made to ensure accuracy; however, inadvertent, unintentional computerized transcription errors may be present.

## 2021-08-01 DIAGNOSIS — G43.009 MIGRAINE WITHOUT AURA AND WITHOUT STATUS MIGRAINOSUS, NOT INTRACTABLE: ICD-10-CM

## 2021-08-02 RX ORDER — SUMATRIPTAN 100 MG/1
TABLET, FILM COATED ORAL
Qty: 9 TABLET | Refills: 2 | Status: SHIPPED | OUTPATIENT
Start: 2021-08-02 | End: 2021-10-29

## 2021-08-04 ENCOUNTER — E-VISIT (OUTPATIENT)
Dept: INTERNAL MEDICINE CLINIC | Age: 31
End: 2021-08-04
Payer: COMMERCIAL

## 2021-08-04 DIAGNOSIS — T78.40XA ALLERGIC RASH PRESENT ON EXAMINATION: Primary | ICD-10-CM

## 2021-08-04 PROCEDURE — 99422 OL DIG E/M SVC 11-20 MIN: CPT | Performed by: FAMILY MEDICINE

## 2021-08-04 RX ORDER — PREDNISONE 20 MG/1
TABLET ORAL
Qty: 13 TABLET | Refills: 0 | Status: SHIPPED | OUTPATIENT
Start: 2021-08-04 | End: 2021-09-28 | Stop reason: ALTCHOICE

## 2021-08-04 RX ORDER — RIBOFLAVIN (VITAMIN B2) 400 MG
TABLET ORAL
COMMUNITY
Start: 2021-07-29

## 2021-08-04 RX ORDER — VITAMIN B COMPLEX
TABLET ORAL
COMMUNITY
Start: 2021-07-30

## 2021-08-04 NOTE — PROGRESS NOTES
SUBJECTIVE:  See e-visit questionnaire. Medications, allergies reviewed. OBJECTIVE:  NA    ASSESSMENT:  .1. Allergic rash present on examination  Not clear of the cause. - predniSONE (DELTASONE) 20 MG tablet; Take 2 tabs for 2 days, 1 tablet for 5 days, 1/2 tablet for 1 week: Take with food and early in the day. Dispense: 13 tablet; Refill: 0        PLAN:  See orders and patient message for further instructions. I really do not know what is causing this. Any bites or stings? Any new laundry softeners, detergents, soaps, lotions? Any new medications including over the counter?       I will put you on 2 full weeks of steroids. I hope this will take care of it. If it does not causes drowiness, take a Zyrtec 10 mg twice a day. If too drowsy, take 1 at bedtime and a Claritin during the day. 11-13 minutes were spent on digital evaluation and management.

## 2021-08-05 ENCOUNTER — VIRTUAL VISIT (OUTPATIENT)
Dept: PSYCHOLOGY | Age: 31
End: 2021-08-05
Payer: COMMERCIAL

## 2021-08-05 DIAGNOSIS — F41.1 GAD (GENERALIZED ANXIETY DISORDER): Primary | ICD-10-CM

## 2021-08-05 DIAGNOSIS — F90.0 ATTENTION DEFICIT HYPERACTIVITY DISORDER (ADHD), PREDOMINANTLY INATTENTIVE TYPE: ICD-10-CM

## 2021-08-05 PROCEDURE — 90832 PSYTX W PT 30 MINUTES: CPT | Performed by: PSYCHOLOGIST

## 2021-08-05 NOTE — PROGRESS NOTES
TELEHEALTH VISIT -- Audio/Visual (During MNROK-01 public health emergency)  }  Pursuant to the emergency declaration under the [address] [place] [address], [facility] waiver authority and the [employer] Act, this Virtual Visit was conducted, with patient's consent, to reduce the patient's risk of exposure to COVID-19 and provide continuity of care for an established patient. Services were provided through a video synchronous discussion virtually to substitute for in-person clinic visit. Pt gave verbal informed consent to participate in telehealth services. Conducted a risk-benefit analysis and determined that the patient's presenting problems are consistent with the use of telepsychology. Determined that the patient has sufficient knowledge and skills in the use of technology enabling them to adequately benefit from telepsychology. It was determined that this patient was able to be properly treated without an in-person session. Patient verified that they were currently located at the [facility] address that was provided during registration or another [facility] address, if noted below.     Verified the following information:  Patient's identification: Yes  Patient location: [address] [place] [address]   Patient's call back number: [phone]   Patient's emergency contact's name and number, as well as permission to contact them if needed: Extended Emergency Contact Information  Primary Emergency Contact: Blair Mckeon  Address: [address] [facility], [address] [place] [address] Phone: [phone]  Work Phone: [phone]  Relation: Parent  Secondary Emergency Contact: Dorita Baumann  Address: [address] [place] [address], [address] [place] [address] Phone: [phone]  Work Phone: [phone]  Relation: Parent     Provider location: [address]  Misa Arteaga, Ph.D.  Psychologist  8/5/2021  1:15 PM      Time spent with Patient: 22 minutes  This is patient's 26th  [facility] appointment. Reason for Consult:    Chief Complaint   Patient presents with    Anxiety       Feedback given to PCP. S:  Pt seen for f/u of depression and anxiety. Pt reported stable, slightly improved mood and sxs. Broke out in allergic hives yesterday, unknown origin. Stated her mom doesn't want to help take care of her when sick anymore since she turned 27. Had a friend stay w her for a day while they had an apartment crisis. Stated she has only known this friend a month and started to question their intention, worry if they were going to deidra her. Discussed balancing normative fear or skepticsm response w some trust. Continues to successfully keep main area of home clean, bedroom continues to be messy d/t finding her storage situation \"overwhelming\"    Has been taking medication consistently.      O:  MSE:  Appearance    alert, cooperative  Appetite abnormal: see above  Sleep disturbance Yes  Fatigue Yes  Loss of pleasure no  Impulsive behavior Yes  Speech    spontaneous, normal rate, normal volume and well articulated  Mood    Anxious  Affect    anxiety  Thought Content    intact  Thought Process    linear, goal directed and coherent  Associations    logical connections  Insight    Fair  Judgment    Intact  Orientation    oriented to person, place, time, and general circumstances  Memory    recent and remote memory intact  Attention/Concentration    impaired  Morbid ideation No  Suicide Assessment    no suicidal ideation    History:  Social History:   Social History     Socioeconomic History    Marital status: Single     Spouse name: Not on file    Number of children: 0    Years of education: 12    Highest education level: Not on file   Occupational History    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    Occupation: [employer]      Comment: [employer]   Tobacco Use  Smoking status: Former Smoker     Packs/day: 0.25     Years: 0.50     Pack years: 0.12     Types: Cigarettes     Start date: 2013     Quit date: 2013     Years since quittin.6    Smokeless tobacco: Never Used    Tobacco comment: Denies smoking at this time   Substance and Sexual Activity    Alcohol use: Yes     Comment: h/o weekend binges 5 or more drinks when out 1-2x/week;  :  cut most to all drinking out    Drug use: Yes     Types: Marijuana    Sexual activity: Not Currently     Partners: Male     Birth control/protection: Condom, OCP   Other Topics Concern    Not on file   Social History Narrative    Not on file     Social Determinants of Health     Financial Resource Strain:     Difficulty of Paying Living Expenses:    Food Insecurity:     Worried About Running Out of Food in the Last Year:     [address] in the Last Year:    Transportation Needs:     Lack of Transportation (Medical):  Lack of Transportation (Non-Medical):    Physical Activity:     Days of Exercise per Week:     Minutes of Exercise per Session:    Stress:     Feeling of Stress :    Social Connections:     Frequency of Communication with Friends and Family:     Frequency of Social Gatherings with Friends and Family:     Attends [employer] Services:     Active Member of Clubs or Organizations:     Attends Club or Organization Meetings:     Marital Status:    Intimate Partner Violence:     Fear of Current or Ex-Partner:     Emotionally Abused:     Physically Abused:     Sexually Abused:      TOBACCO:   reports that she quit smoking about 7 years ago. Her smoking use included cigarettes. She started smoking about 7 years ago. She has a 0.13 pack-year smoking history. She has never used smokeless tobacco.  ETOH:   reports current alcohol use. Diagnosis:  1. VERA (generalized anxiety disorder)    2.  Attention deficit hyperactivity disorder (ADHD), predominantly inattentive type Plan:  Pt interventions:  Trained in strategies for increasing balanced thinking, Discussed self-care (sleep, nutrition, rewarding activities, social support, exercise), Supportive techniques and Identified maladaptive thoughts        Documentation was done using voice recognition dragon software. Every effort was made to ensure accuracy; however, inadvertent, unintentional computerized transcription errors may be present.

## 2021-08-27 ENCOUNTER — VIRTUAL VISIT (OUTPATIENT)
Dept: PSYCHOLOGY | Age: 31
End: 2021-08-27
Payer: COMMERCIAL

## 2021-08-27 DIAGNOSIS — F90.0 ATTENTION DEFICIT HYPERACTIVITY DISORDER (ADHD), PREDOMINANTLY INATTENTIVE TYPE: ICD-10-CM

## 2021-08-27 DIAGNOSIS — F41.0 PANIC DISORDER: Primary | ICD-10-CM

## 2021-08-27 DIAGNOSIS — F41.1 GAD (GENERALIZED ANXIETY DISORDER): ICD-10-CM

## 2021-08-27 PROCEDURE — 98967 PH1 ASSMT&MGMT NQHP 11-20: CPT | Performed by: PSYCHOLOGIST

## 2021-08-27 NOTE — PROGRESS NOTES
of [facility] Phone: [phone]  Work Phone: [phone]  Relation: Parent     Provider location: [place] [facility] [facility] affirm this is an episode with a patient who has not had a related appointment within my department in the past 7 days or scheduled within the next 24 hours. Behavioral Health Consultation  Concepcion Li, Ph.D.  Psychologist  8/27/2021  12:10 PM      Time spent with Patient: 20 minutes  This is patient's 27th  [facility] appointment. Reason for Consult:    Chief Complaint   Patient presents with    Anxiety       Feedback given to PCP. S:  Pt seen for f/u of anxiety, ADHD. Pt reported improved mood and sxs. Was able to go on vacation w minimal anxiety. Noted she went to a city she use to frequent w her ex and found it manageable. Has been waking up in the middle of the night thinking someone is at her door (assumes the sounds of cats playing). Takes about 30 mins to calm back down, sometimes happens mulitple times. Discussed utilizing grounding and mindfulness. Worrying about people being annoyed w her. Of note, pt was w friends kayaking on [place]. Offered to r/s, pt decided to keep appt.      O:  MSE:  Appetite normal  Sleep disturbance Yes  Fatigue Yes  Loss of pleasure No  Impulsive behavior No  Speech    spontaneous, normal rate, normal volume and well articulated  Mood    Anxious  Affect    anxiety  Thought Content    intact  Thought Process    linear, goal directed and coherent  Associations    logical connections  Insight    Fair  Judgment    Intact  Orientation    oriented to person, place, time, and general circumstances  Memory    recent and remote memory intact  Attention/Concentration    intact  Morbid ideation No  Suicide Assessment    no suicidal ideation    History:  Social History:   Social History     Socioeconomic History    Marital status: Single     Spouse name: Not on file    Number of children: 0    Years of education: 12    Highest education level: Not on file Occupational History    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    Occupation: [employer]      Comment: [address]   Tobacco Use    Smoking status: Former Smoker     Packs/day: 0.25     Years: 0.50     Pack years: 0.12     Types: Cigarettes     Start date: 2013     Quit date: 2013     Years since quittin.6    Smokeless tobacco: Never Used    Tobacco comment: Denies smoking at this time   Substance and Sexual Activity    Alcohol use: Yes     Comment: h/o weekend binges 5 or more drinks when out 1-2x/week;  :  cut most to all drinking out    Drug use: Yes     Types: Marijuana    Sexual activity: Not Currently     Partners: Male     Birth control/protection: Condom, OCP   Other Topics Concern    Not on file   Social History Narrative    Not on file     Social Determinants of Health     Financial Resource Strain:     Difficulty of Paying Living Expenses:    Food Insecurity:     Worried About Running Out of Food in the Last Year:     [address] in the Last Year:    Transportation Needs:     Lack of Transportation (Medical):  Lack of Transportation (Non-Medical):    Physical Activity:     Days of Exercise per Week:     Minutes of Exercise per Session:    Stress:     Feeling of Stress :    Social Connections:     Frequency of Communication with Friends and Family:     Frequency of Social Gatherings with Friends and Family:     Attends [employer] Services:     Active Member of Clubs or Organizations:     Attends Club or Organization Meetings:     Marital Status:    Intimate Partner Violence:     Fear of Current or Ex-Partner:     Emotionally Abused:     Physically Abused:     Sexually Abused:      TOBACCO:   reports that she quit smoking about 7 years ago. Her smoking use included cigarettes. She started smoking about 7 years ago. She has a 0.13 pack-year smoking history.  She has never used smokeless tobacco.  ETOH:   reports current alcohol use.    Diagnosis:  1. Panic disorder    2. VERA (generalized anxiety disorder)    3. Attention deficit hyperactivity disorder (ADHD), predominantly inattentive type          Plan:  Pt interventions:  Supportive techniques, Provided Psychoeducation re: grounding and mindfulness and Identified maladaptive thoughts        Documentation was done using voice recognition dragon software. Every effort was made to ensure accuracy; however, inadvertent, unintentional computerized transcription errors may be present.

## 2021-08-31 ENCOUNTER — TELEPHONE (OUTPATIENT)
Dept: ADMINISTRATIVE | Age: 31
End: 2021-08-31

## 2021-08-31 DIAGNOSIS — G43.109 MIGRAINE WITH AURA AND WITHOUT STATUS MIGRAINOSUS, NOT INTRACTABLE: ICD-10-CM

## 2021-08-31 NOTE — TELEPHONE ENCOUNTER
Received a PA for Aimovig 70MG/ML auto-injectors. Will need an updated Rx in epic to complete. Erenumab-aooe (AIMOVIG) 70 MG/ML SOAJ [[id number]]  DISCONTINUED      Please advise.

## 2021-09-14 ENCOUNTER — PATIENT MESSAGE (OUTPATIENT)
Dept: INTERNAL MEDICINE CLINIC | Age: 31
End: 2021-09-14

## 2021-09-15 NOTE — TELEPHONE ENCOUNTER
From: Kiara Jamison  To: Mason Casanova MD  Sent: 9/14/2021 7:19 PM EDT  Subject: Non-Urgent Medical Question    Hi! I have been sweating like its nobodys business. Im talking hair soaking wet, face dripping, neck dripping. I thought it was the ajovy but apparently ssri can make face sweating happen. Im still on the 5mg so Im not sure if I need to change medications or what but this sweating is ridiculous and embarrassing.  Let me know what you think

## 2021-09-22 ENCOUNTER — VIRTUAL VISIT (OUTPATIENT)
Dept: PSYCHOLOGY | Age: 31
End: 2021-09-22
Payer: COMMERCIAL

## 2021-09-22 DIAGNOSIS — F41.0 PANIC DISORDER: ICD-10-CM

## 2021-09-22 DIAGNOSIS — F90.0 ATTENTION DEFICIT HYPERACTIVITY DISORDER (ADHD), PREDOMINANTLY INATTENTIVE TYPE: ICD-10-CM

## 2021-09-22 DIAGNOSIS — F41.1 GAD (GENERALIZED ANXIETY DISORDER): Primary | ICD-10-CM

## 2021-09-22 PROCEDURE — 90832 PSYTX W PT 30 MINUTES: CPT | Performed by: PSYCHOLOGIST
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
## 2021-09-28 ENCOUNTER — VIRTUAL VISIT (OUTPATIENT)
Dept: INTERNAL MEDICINE CLINIC | Age: 31
End: 2021-09-28
Payer: COMMERCIAL

## 2021-09-28 DIAGNOSIS — F41.0 PANIC DISORDER: ICD-10-CM

## 2021-09-28 DIAGNOSIS — K21.9 GASTROESOPHAGEAL REFLUX DISEASE WITHOUT ESOPHAGITIS: ICD-10-CM

## 2021-09-28 DIAGNOSIS — Z79.899 CHRONIC USE OF BENZODIAZEPINE FOR THERAPEUTIC PURPOSE: Primary | ICD-10-CM

## 2021-09-28 DIAGNOSIS — G43.109 MIGRAINE WITH AURA AND WITHOUT STATUS MIGRAINOSUS, NOT INTRACTABLE: ICD-10-CM

## 2021-09-28 DIAGNOSIS — F90.0 ATTENTION DEFICIT HYPERACTIVITY DISORDER (ADHD), PREDOMINANTLY INATTENTIVE TYPE: ICD-10-CM

## 2021-09-28 PROCEDURE — 99214 OFFICE O/P EST MOD 30 MIN: CPT | Performed by: FAMILY MEDICINE

## 2021-09-28 RX ORDER — METOCLOPRAMIDE 10 MG/1
TABLET ORAL
Qty: 90 TABLET | Refills: 0 | Status: SHIPPED | OUTPATIENT
Start: 2021-09-28

## 2021-09-28 RX ORDER — ALPRAZOLAM 0.5 MG/1
TABLET ORAL
Qty: 20 TABLET | Refills: 1 | Status: SHIPPED | OUTPATIENT
Start: 2021-09-28 | End: 2021-11-26 | Stop reason: SDUPTHER

## 2021-09-28 RX ORDER — DEXTROAMPHETAMINE SACCHARATE, AMPHETAMINE ASPARTATE MONOHYDRATE, DEXTROAMPHETAMINE SULFATE AND AMPHETAMINE SULFATE 2.5; 2.5; 2.5; 2.5 MG/1; MG/1; MG/1; MG/1
10 CAPSULE, EXTENDED RELEASE ORAL DAILY PRN
Qty: 30 CAPSULE | Refills: 0 | Status: SHIPPED | OUTPATIENT
Start: 2021-09-28 | End: 2022-04-01

## 2021-09-28 NOTE — PROGRESS NOTES
Leigh Ann Bee (:  1990) is a 31 y.o. female,Established patient, here for evaluation of the following chief complaint(s): Medication Check         ASSESSMENT/PLAN:  .1. Chronic use of benzodiazepine for therapeutic purpose  OARRS profile checked and is valid. 20 pills lasted almost 6 weeks. Improved. Prior 20 pills every 30 days. 2. Panic disorder  Continue to work with PhD.  Natalya Deal Progressive relaxation before bedtime. She says she is not consistently doing this when she is first awake. Discussed habits.    - ALPRAZolam (XANAX) 0.5 MG tablet; TAKE 1 TABLET BY MOUTH AT ONSET OF PANIC ATTACK AND REPEAT SECOND DOSE IN ONE HOUR IF NOT RESOLVED  Dispense: 20 tablet; Refill: 1    3. Attention deficit hyperactivity disorder (ADHD), predominantly inattentive type  Use only if needed. Watch for headache as a side effect. - amphetamine-dextroamphetamine (ADDERALL XR) 10 MG extended release capsule; Take 1 capsule by mouth daily as needed (need for ADD treatment to get work done.) for up to 30 days. Dispense: 30 capsule; Refill: 0    4. Migraine with aura and without status migrainosus, not intractable  Great response to Ajovy. Continue use. 5. Gastroesophageal reflux disease without esophagitis  Routine refill for intermittent use. - metoclopramide (REGLAN) 10 MG tablet; 1/2 - 1 tab at beginning of meals and if needed 1 at bedtime. Do not use more than 3 weeks in a row without breaking for a week. Dispense: 90 tablet; Refill: 0        See orders. See after visit summary, patient instructions, and reference hand-outs. A PRINTED SUMMARY = AVS GIVEN TO THE PATIENT, (or if Virtual Visit, patient told it is available in My Chart or will be mailed if not active on My Chart.)    Discussed use, benefit, and side effects of prescribed medications. Barriers to medication compliance addressed. All patient questions answered. Follow up:  11-12 weeks.           SUBJECTIVE/OBJECTIVE:  HPI Med check up for Xanax, Adderall and migraines    1. Controlled substances:   90 day follow up for controlled substance(s)    OD risk:  200    Lorazepam Equiv dose:  0.23 with last refill of 0.5 mg  on  9/23/21 for 20 tabs. Adderall ER 10 mg with LR on June 29 for 30 pills. Has been fearful of taking it due to intensifying her migraines but has trouble focusing at work. Using medication for:  Panic attacks and ADD   Functional improvement noted compared to before taking this controlled medication:  Able to work and not go crazy with panic attack. Able to focus more. Adverse effects:  Xanax none: Adderall increase migraine if HA is present. Aberrant behavior: none      Still working doing the progressive muscle relaxation. She has had a couple of bad panic attacks. She thinks she has PTSD from a prior boyfriend 2 years ago. He is trying to contact her with [employer]. She blocked him once and had to block him on his new account. Found out he is a  and this frightens her a little bit but she does not think he knows where she lives. She feels safer since she moved last year. She is working with PhD for her anxiety and PTSD. Migraines: The Ajovy is doing great. She thinks it may be causing the sweating, but willing to put up with this due to improvement in migraines. .    Has not been having migraines and no headaches at all for 1 month. Last month at the beginning of August right before the shot was due, she had a migraine but none since. .  No headaches at all since her dose on 9/7/21  She even had no migraine or headache when out in the sun on boat and some etoh --- which in the past would have triggered a migraine. Work scheduled changed to 1 PM to 10:30 PM.  Getting used to it. She has no other issues.         Outpatient Medications Marked as Taking for the 9/28/21 encounter (Virtual Visit) with Melva Bella MD   Medication Sig Dispense Refill   Gideon Ariza Coenzyme Q10 (COQ10) 100 MG CAPS       Riboflavin 400 MG TABS       escitalopram (LEXAPRO) 5 MG tablet Take 1.5 tablets by mouth daily 45 tablet 5    AJOVY 225 MG/1.5ML SOSY INJECT ONE SYRINGE (1.5 MLS) UNDER THE SKIN EVERY 30 DAYS 1.5 Syringe 2    levonorgestrel-ethinyl estradiol (INTROVALE) 0.15-0.03 MG per tablet TAKE 1 TABLET BY MOUTH DAILY 91 tablet 3    fluticasone (FLONASE) 50 MCG/ACT nasal spray 1-2 sprays each nostril daily. Must be used regularly to help. 1 Bottle 12    omeprazole (PRILOSEC) 20 MG delayed release capsule Take 1 capsule by mouth daily as needed (for flare up. take until well and then stop for a while) 30 capsule 3    clindamycin-benzoyl peroxide (BENZACLIN) 1-5 % gel APPLY EXTERNALLY TO THE AFFECTED AREA TWICE DAILY 150 g 3    cetirizine (ZYRTEC) 10 MG tablet Take 1 tablet by mouth daily Works best to take at bedtime. 90 tablet 3       Review of Systems    Patient-Reported Vitals 7/12/2021   Patient-Reported Weight 227lb   Patient-Reported Height 5f8   Patient-Reported Pulse -   Patient-Reported Temperature -        Physical Exam  Constitutional:       Appearance: Normal appearance. Eyes:      General: No scleral icterus. Pulmonary:      Effort: Pulmonary effort is normal. No respiratory distress. Skin:     Coloration: Skin is not pale. Neurological:      General: No focal deficit present. Mental Status: She is alert and oriented to person, place, and time. Psychiatric:         Attention and Perception: Attention normal.         Mood and Affect: Mood and affect normal.         Behavior: Behavior normal.      Comments: Pleasant. Looks like she just got out of bed recently before visit time at 10:30 AM         Counseling and coordination of care is greater than 50% of the visit. Time of visit: 32+ minutes. Yefri Martinez, was evaluated through a synchronous (real-time) audio-video encounter.  The patient (or guardian if applicable) is aware that this is a billable service. Verbal consent to proceed has been obtained within the past 12 months. The visit was conducted pursuant to the emergency declaration under the [address] [place] [address] authority and the [employer] Act. Patient identification was verified, and a caregiver was present when appropriate. The patient was located in a state where the provider was credentialed to provide care. An electronic signature was used to authenticate this note.     --Lei Sharif MD

## 2021-10-11 ENCOUNTER — VIRTUAL VISIT (OUTPATIENT)
Dept: PSYCHOLOGY | Age: 31
End: 2021-10-11
Payer: COMMERCIAL

## 2021-10-11 DIAGNOSIS — F41.1 GAD (GENERALIZED ANXIETY DISORDER): Primary | ICD-10-CM

## 2021-10-11 DIAGNOSIS — F90.0 ATTENTION DEFICIT HYPERACTIVITY DISORDER (ADHD), PREDOMINANTLY INATTENTIVE TYPE: ICD-10-CM

## 2021-10-11 PROCEDURE — 90832 PSYTX W PT 30 MINUTES: CPT | Performed by: PSYCHOLOGIST

## 2021-10-11 NOTE — PROGRESS NOTES
TELEHEALTH VISIT -- Audio/Visual (During TRHII-31 public health emergency)  }  Pursuant to the emergency declaration under the [address] [place] [address], [facility] waiver authority and the [employer] Act, this Virtual Visit was conducted, with patient's consent, to reduce the patient's risk of exposure to COVID-19 and provide continuity of care for an established patient. Services were provided through a video synchronous discussion virtually to substitute for in-person clinic visit. Pt gave verbal informed consent to participate in telehealth services. Conducted a risk-benefit analysis and determined that the patient's presenting problems are consistent with the use of telepsychology. Determined that the patient has sufficient knowledge and skills in the use of technology enabling them to adequately benefit from telepsychology. It was determined that this patient was able to be properly treated without an in-person session. Patient verified that they were currently located at the [facility] address that was provided during registration or another [facility] address, if noted below.     Verified the following information:  Patient's identification: Yes  Patient location: [address] [place] [address]   Patient's call back number: [phone]   Patient's emergency contact's name and number, as well as permission to contact them if needed: Extended Emergency Contact Information  Primary Emergency Contact: Blair Mckeon  Address: [address] [facility], [address] [place] [address] Phone: [phone]  Work Phone: [phone]  Relation: Parent  Secondary Emergency Contact: Dorita Baumann  Address: [address] [place] [address], [address] [place] [address] Phone: [phone]  Work Phone: [phone]  Relation: Parent     Provider location: [address]  Clarice Madrid, Ph.D.  Psychologist  10/11/2021  11:03 AM      Time spent with Patient: 29 minutes  This is patient's   [facility] appointment. Reason for Consult:    Chief Complaint   Patient presents with    Anxiety       Feedback given to PCP. S:  Pt seen for f/u of anxiety, ADHD, intermittent deprssion. Pt reported variable mood and sxs. Was feeling depressed over the weekend, insightfully made plans for enjoyable things, but once she got home, it hit her and has been struggling w amotivation since. Noted pattern of occurring on Saturday. Trying to force herself to make social plans for next Saturday. Focused on behavioral activation and discussing complicated relationship.       O:  MSE:    Appearance    alert, cooperative  Appetite normal  Sleep disturbance Yes  Fatigue Yes  Loss of pleasure No  Impulsive behavior No  Speech    spontaneous, normal rate, normal volume and well articulated  Mood    Anxious  Affect    anxiety  Thought Content    intact and cognitive distortions  Thought Process    goal directed and coherent  Associations    logical connections  Insight    Fair  Judgment    Intact  Orientation    oriented to person, place, time, and general circumstances  Memory    recent and remote memory intact  Attention/Concentration    impaired  Morbid ideation No  Suicide Assessment    no suicidal ideation    History:  Social History:   Social History     Socioeconomic History    Marital status: Single     Spouse name: Not on file    Number of children: 0    Years of education: 12    Highest education level: Not on file   Occupational History    Occupation:  [email or web address]     Comment: biology    Occupation: [employer]      Comment: [employer]   Tobacco Use    Smoking status: Former Smoker     Packs/day: 0.25     Years: 0.50     Pack years: 0.12     Types: Cigarettes     Start date: 2013     Quit date: 2013     Years since quittin.7    Smokeless tobacco: Never Used    Tobacco comment: Denies smoking at this time   Substance and Sexual Activity    Alcohol use: Yes     Comment: h/o weekend binges 5 or more drinks when out 1-2x/week;  11/14:  cut most to all drinking out    Drug use: Yes     Types: Marijuana    Sexual activity: Not Currently     Partners: Male     Birth control/protection: Condom, OCP   Other Topics Concern    Not on file   Social History Narrative    Not on file     Social Determinants of Health     Financial Resource Strain:     Difficulty of Paying Living Expenses:    Food Insecurity:     Worried About Running Out of Food in the Last Year:     [address] in the Last Year:    Transportation Needs:     Lack of Transportation (Medical):  Lack of Transportation (Non-Medical):    Physical Activity:     Days of Exercise per Week:     Minutes of Exercise per Session:    Stress:     Feeling of Stress :    Social Connections:     Frequency of Communication with Friends and Family:     Frequency of Social Gatherings with Friends and Family:     Attends Gnosticism Services:     Active Member of Clubs or Organizations:     Attends Club or Organization Meetings:     Marital Status:    Intimate Partner Violence:     Fear of Current or Ex-Partner:     Emotionally Abused:     Physically Abused:     Sexually Abused:      TOBACCO:   reports that she quit smoking about 7 years ago. Her smoking use included cigarettes. She started smoking about 7 years ago. She has a 0.13 pack-year smoking history. She has never used smokeless tobacco.  ETOH:   reports current alcohol use. Diagnosis:  generalized anxiety disorder  ADHD    Plan:  Pt interventions:  Trained in strategies for increasing balanced thinking and Discussed and set plan for behavioral activation        Documentation was done using voice recognition dragon software. Every effort was made to ensure accuracy; however, inadvertent, unintentional computerized transcription errors may be present.

## 2021-10-29 ENCOUNTER — OFFICE VISIT (OUTPATIENT)
Dept: INTERNAL MEDICINE CLINIC | Age: 31
End: 2021-10-29
Payer: COMMERCIAL

## 2021-10-29 VITALS
OXYGEN SATURATION: 98 % | HEART RATE: 74 BPM | SYSTOLIC BLOOD PRESSURE: 130 MMHG | DIASTOLIC BLOOD PRESSURE: 84 MMHG | WEIGHT: 238 LBS | BODY MASS INDEX: 36.19 KG/M2

## 2021-10-29 DIAGNOSIS — Z87.442 HISTORY OF RENAL CALCULI: ICD-10-CM

## 2021-10-29 DIAGNOSIS — G43.009 MIGRAINE WITHOUT AURA AND WITHOUT STATUS MIGRAINOSUS, NOT INTRACTABLE: ICD-10-CM

## 2021-10-29 DIAGNOSIS — R10.11 RIGHT UPPER QUADRANT PAIN: Primary | ICD-10-CM

## 2021-10-29 DIAGNOSIS — R10.9 RIGHT FLANK PAIN: ICD-10-CM

## 2021-10-29 LAB
BILIRUBIN, POC: NORMAL
BLOOD URINE, POC: NORMAL
CLARITY, POC: CLEAR
COLOR, POC: YELLOW
GLUCOSE URINE, POC: NORMAL
KETONES, POC: NORMAL
LEUKOCYTE EST, POC: NORMAL
NITRITE, POC: NORMAL
PH, POC: 7
PROTEIN, POC: NORMAL
SPECIFIC GRAVITY, POC: >=1.03
UROBILINOGEN, POC: 0.2

## 2021-10-29 PROCEDURE — 99214 OFFICE O/P EST MOD 30 MIN: CPT | Performed by: NURSE PRACTITIONER

## 2021-10-29 PROCEDURE — 81002 URINALYSIS NONAUTO W/O SCOPE: CPT | Performed by: NURSE PRACTITIONER

## 2021-10-29 RX ORDER — SUMATRIPTAN 100 MG/1
TABLET, FILM COATED ORAL
Qty: 9 TABLET | Refills: 2 | Status: SHIPPED | OUTPATIENT
Start: 2021-10-29 | End: 2021-10-29

## 2021-10-29 RX ORDER — SUMATRIPTAN 100 MG/1
TABLET, FILM COATED ORAL
Qty: 9 TABLET | Refills: 2 | Status: SHIPPED | OUTPATIENT
Start: 2021-10-29 | End: 2022-01-28 | Stop reason: SDUPTHER

## 2021-10-29 SDOH — ECONOMIC STABILITY: FOOD INSECURITY: WITHIN THE PAST 12 MONTHS, THE FOOD YOU BOUGHT JUST DIDN'T LAST AND YOU DIDN'T HAVE MONEY TO GET MORE.: NEVER TRUE

## 2021-10-29 SDOH — ECONOMIC STABILITY: FOOD INSECURITY: WITHIN THE PAST 12 MONTHS, YOU WORRIED THAT YOUR FOOD WOULD RUN OUT BEFORE YOU GOT MONEY TO BUY MORE.: NEVER TRUE

## 2021-10-29 ASSESSMENT — ENCOUNTER SYMPTOMS
COUGH: 0
CHEST TIGHTNESS: 0
WHEEZING: 0
SHORTNESS OF BREATH: 0

## 2021-10-29 ASSESSMENT — SOCIAL DETERMINANTS OF HEALTH (SDOH): HOW HARD IS IT FOR YOU TO PAY FOR THE VERY BASICS LIKE FOOD, HOUSING, MEDICAL CARE, AND HEATING?: NOT HARD AT ALL

## 2021-10-29 NOTE — PROGRESS NOTES
10/29/21     Chief Complaint   Patient presents with    Abdominal Pain     RLQ pain, since Monday. Hx. of kidney problems. UA ordered     HPI     Pt is here for an acute visit. Reports a 4 day history of RUQ and right flank pain that has been constant. Stabbing pain. Has been consistent the past 4 days, reports she is feeling a little better this morning but is still present. Denies n/v/d/c. Thought maybe it was gas, took laxatives for a few days without relief. Thought it was GERD initially bc she has also been having some increased gerd symptoms the last few weeks. Not worse after eating. Some ongoing chronic urinary frequency which might be slightly worse. Having some urinary urgency and pressure. Denies dysuria or hematuria. History of renal calculi but this feels different. Allergies   Allergen Reactions    Bactrim [Sulfamethoxazole-Trimethoprim] Itching and Rash    Reglan [Metoclopramide] Other (See Comments)     Dystonic reaction with IV form. Can tolerate the PO    Sulfa Antibiotics      Fever and red skin. Not clear if [employer] or not. Was not hospitalized. Current Outpatient Medications   Medication Sig Dispense Refill    Fremanezumab-vfrm (AJOVY) 225 MG/1.5ML SOSY INJECT ONE SYRINGE (1.5 MLS) UNDER THE SKIN EVERY 30 DAYS 1 each 5    metoclopramide (REGLAN) 10 MG tablet 1/2 - 1 tab at beginning of meals and if needed 1 at bedtime. Do not use more than 3 weeks in a row without breaking for a week. 90 tablet 0    ALPRAZolam (XANAX) 0.5 MG tablet TAKE 1 TABLET BY MOUTH AT ONSET OF PANIC ATTACK AND REPEAT SECOND DOSE IN ONE HOUR IF NOT RESOLVED 20 tablet 1    Coenzyme Q10 (COQ10) 100 MG CAPS       escitalopram (LEXAPRO) 5 MG tablet Take 1.5 tablets by mouth daily 45 tablet 5    levonorgestrel-ethinyl estradiol (INTROVALE) 0.15-0.03 MG per tablet TAKE 1 TABLET BY MOUTH DAILY 91 tablet 3    fluticasone (FLONASE) 50 MCG/ACT nasal spray 1-2 sprays each nostril daily. Must be used regularly to help. 1 Bottle 12    omeprazole (PRILOSEC) 20 MG delayed release capsule Take 1 capsule by mouth daily as needed (for flare up. take until well and then stop for a while) 30 capsule 3    clindamycin-benzoyl peroxide (BENZACLIN) 1-5 % gel APPLY EXTERNALLY TO THE AFFECTED AREA TWICE DAILY 150 g 3    cetirizine (ZYRTEC) 10 MG tablet Take 1 tablet by mouth daily Works best to take at bedtime. 90 tablet 3    amphetamine-dextroamphetamine (ADDERALL XR) 10 MG extended release capsule Take 1 capsule by mouth daily as needed (need for ADD treatment to get work done.) for up to 30 days. 30 capsule 0    Riboflavin 400 MG TABS  (Patient not taking: Reported on 10/29/2021)      SUMAtriptan (IMITREX) 100 MG tablet TAKE ONE TABLET BY MOUTH AT ONSET OF HEADACHE; MAY REPEAT ONE TABLET IN 2 HOURS IF NEEDED. MAX OF 2 TABLETS PER DAY (Patient not taking: Reported on 9/28/2021) 9 tablet 2    butalbital-acetaminophen-caffeine (FIORICET, ESGIC) -40 MG per tablet TAKE ONE TABLET BY MOUTH EVERY 4 HOURS AS NEEDED FOR HEADACHES **LIMIT TO 2 DAYS PER WEEK, DO NOT USE DAILY** (Patient not taking: Reported on 7/12/2021) 30 tablet 0    propranolol (INDERAL) 10 MG tablet TAKE 1-2 TABLETS BY MOUTH THREE TIMES DAILY AS NEEDED(ANXIETY, PANIC ATTACK) (Patient not taking: Reported on 7/12/2021) 30 tablet 5    hydrOXYzine (VISTARIL) 25 MG capsule 25 mg at bedtime for 3 nights to break the cycle of headache then PRN (Patient not taking: Reported on 4/19/2021) 30 capsule 3    ondansetron (ZOFRAN ODT) 4 MG disintegrating tablet Take 1 tablet by mouth every 4-6 hours as needed for Nausea (Patient not taking: Reported on 7/12/2021) 12 tablet 0    predniSONE (DELTASONE) 20 MG tablet Take 2 tablets daily for unrelenting migraine; Take with breakfast or lunch.  (Patient not taking: Reported on 3/16/2021) 10 tablet 2    naproxen sodium (ANAPROX) 550 MG tablet Take one at onset of migraine or musculoskeletal pain and repeat in 6 hours or more but maximum of 2 doses per day. (Patient not taking: Reported on 3/16/2021) 60 tablet 2    diclofenac (CATAFLAM) 50 MG tablet Take 1 tablet by mouth 3 times daily as needed (headache) Limit to 10 days a month (Patient not taking: Reported on 3/16/2021) 90 tablet 0     No current facility-administered medications for this visit. Review of Systems   Constitutional: Negative for chills, fatigue and fever. Respiratory: Negative for cough, chest tightness, shortness of breath and wheezing. Cardiovascular: Negative for chest pain, palpitations and leg swelling. Neurological: Negative for dizziness, tremors, light-headedness and headaches. Vitals:    10/29/21 1133   BP: 130/84   Pulse: 74   SpO2: 98%   Weight: 238 lb (108 kg)      Physical Exam  Vitals reviewed. Constitutional:       General: She is not in acute distress. Appearance: Normal appearance. She is well-developed. She is obese. She is not ill-appearing or diaphoretic. HENT:      Head: Normocephalic and atraumatic. Cardiovascular:      Rate and Rhythm: Normal rate and regular rhythm. Heart sounds: Normal heart sounds. No murmur heard. Pulmonary:      Effort: Pulmonary effort is normal. No respiratory distress. Breath sounds: Normal breath sounds. No wheezing or rhonchi. Abdominal:      General: Bowel sounds are normal. There is no distension. Palpations: Abdomen is soft. Tenderness: There is abdominal tenderness in the right upper quadrant. There is no guarding. Comments: Mild tenderness to RUQ into right flank with deep palpation. Skin:     General: Skin is warm and dry. Neurological:      General: No focal deficit present. Mental Status: She is alert and oriented to person, place, and time.    Psychiatric:         Mood and Affect: Mood and affect normal.         Behavior: Behavior normal.       Assessment/Plan:  Right upper quadrant pain/ right flank pain /History of renal calculi  Pain is improving today    UA is not concerning for UTI, will treat if culture is +  History or renal calculi - no blood on UA today   Checking US of RUQ including right kidney and gallbladder although pain is not exacerbated by fatty foods. Reviewed imaging from last year with pt - she reports she passed the previous stone. - POCT Urinalysis no Micro  - US ABDOMEN LIMITED; Future  - Culture, Urine    Discussed medications with patient, who voiced understanding of their use and indications. All questions answered.     FU criteria reviewed     Electronically signed by RADHA Jackson CNP on 10/29/2021 at 12:44 PM

## 2021-10-31 LAB — URINE CULTURE, ROUTINE: NORMAL

## 2021-11-08 ENCOUNTER — VIRTUAL VISIT (OUTPATIENT)
Dept: PSYCHOLOGY | Age: 31
End: 2021-11-08
Payer: COMMERCIAL

## 2021-11-08 DIAGNOSIS — F90.0 ATTENTION DEFICIT HYPERACTIVITY DISORDER (ADHD), PREDOMINANTLY INATTENTIVE TYPE: ICD-10-CM

## 2021-11-08 DIAGNOSIS — F41.1 GAD (GENERALIZED ANXIETY DISORDER): Primary | ICD-10-CM

## 2021-11-08 PROCEDURE — 90832 PSYTX W PT 30 MINUTES: CPT | Performed by: PSYCHOLOGIST

## 2021-11-08 NOTE — PROGRESS NOTES
TELEHEALTH VISIT -- Audio/Visual (During OVROT-95 public health emergency)  }  Pursuant to the emergency declaration under the [address] [place] [address], [facility] waiver authority and the [employer] Act, this Virtual Visit was conducted, with patient's consent, to reduce the patient's risk of exposure to COVID-19 and provide continuity of care for an established patient. Services were provided through a video synchronous discussion virtually to substitute for in-person clinic visit. Pt gave verbal informed consent to participate in telehealth services. Conducted a risk-benefit analysis and determined that the patient's presenting problems are consistent with the use of telepsychology. Determined that the patient has sufficient knowledge and skills in the use of technology enabling them to adequately benefit from telepsychology. It was determined that this patient was able to be properly treated without an in-person session. Patient verified that they were currently located at the [facility] address that was provided during registration or another [facility] address, if noted below.     Verified the following information:  Patient's identification: Yes  Patient location: [address] [place] [address]   Patient's call back number: [phone]   Patient's emergency contact's name and number, as well as permission to contact them if needed: Extended Emergency Contact Information  Primary Emergency Contact: Blair Mckeon  Address: [address] [facility], [address] [place] [address] Phone: [phone]  Work Phone: [phone]  Relation: Parent  Secondary Emergency Contact: Dorita Baumann  Address: [address] [facility], [address] [place] [address] Phone: [phone]  Work Phone: [phone]  Relation: Parent     Provider location: [address]  Judith Le, Ph.D.  Psychologist  2021  11:36 AM      Time spent with Patient: 28 minutes  This is patient's 30th  [facility] appointment. Reason for Consult:    Chief Complaint   Patient presents with    Anxiety       Feedback given to PCP. S:  Pt seen for f/u of depression, anxiety and ADHD. Pt reported generally unchanged mood and sxs. Doing generally well w behavioral activation planning, but depression returns as soon as she stops. Thinks she is feeling lonely, noticed she is starting to want a romantic partner. Discussed anxiety before bed. Encouraged relaxing routine w diaphragmatic breathing, stretching, using Calm kelsea to help w meditation.       O:  MSE:    Appearance    alert, cooperative  Appetite normal  Sleep disturbance Yes  Fatigue Yes  Loss of pleasure No  Impulsive behavior No  Speech    spontaneous, normal rate, normal volume and well articulated  Mood    Anxious  Affect    anxiety  Thought Content    intact and cognitive distortions  Thought Process    goal directed and coherent  Associations    logical connections  Insight    Fair  Judgment    Intact  Orientation    oriented to person, place, time, and general circumstances  Memory    recent and remote memory intact  Attention/Concentration    impaired  Morbid ideation No  Suicide Assessment    no suicidal ideation  History:  Social History:   Social History     Socioeconomic History    Marital status: Single     Spouse name: Not on file    Number of children: 0    Years of education: 12    Highest education level: Not on file   Occupational History    Occupation:  grad     Comment: biology    Occupation: [employer]      Comment: [employer]   Tobacco Use    Smoking status: Former Smoker     Packs/day: 0.25     Years: 0.50     Pack years: 0.12     Types: Cigarettes     Start date: 2013     Quit date: 2013     Years since quittin.8    Smokeless tobacco: Never Used    Tobacco comment: Denies smoking at this time   Substance and Sexual Activity    Alcohol use: Yes     Comment: h/o weekend binges 5 or more drinks when out 1-2x/week;  11/14:  cut most to all drinking out    Drug use: Yes     Types: Marijuana Flakito Mayra)    Sexual activity: Not Currently     Partners: Male     Birth control/protection: Condom, OCP   Other Topics Concern    Not on file   Social History Narrative    Not on file     Social Determinants of Health     Financial Resource Strain: Low Risk     Difficulty of Paying Living Expenses: Not hard at all   Food Insecurity: No Food Insecurity    Worried About Running Out of Food in the Last Year: Never true    Tanvir of Food in the Last Year: Never true   Transportation Needs:     Lack of Transportation (Medical): Not on file    Lack of Transportation (Non-Medical): Not on file   Physical Activity:     Days of Exercise per Week: Not on file    Minutes of Exercise per Session: Not on file   Stress:     Feeling of Stress : Not on file   Social Connections:     Frequency of Communication with Friends and Family: Not on file    Frequency of Social Gatherings with Friends and Family: Not on file    Attends Mormon Services: Not on file    Active Member of [address] [place] [address] [employer] or Organizations: Not on file    Attends Club or Organization Meetings: Not on file    Marital Status: Not on file   Intimate Partner Violence:     Fear of Current or Ex-Partner: Not on file    Emotionally Abused: Not on file    Physically Abused: Not on file    Sexually Abused: Not on file   Housing Stability:     Unable to Pay for Housing in the Last Year: Not on file    Number of [place] in the Last Year: Not on file    Unstable Housing in the Last Year: Not on file     TOBACCO:   reports that she quit smoking about 7 years ago. Her smoking use included cigarettes. She started smoking about 8 years ago. She has a 0.13 pack-year smoking history.  She has never used smokeless tobacco.  ETOH:   reports current alcohol use.    Diagnosis:  1. VERA (generalized anxiety disorder)    2. Attention deficit hyperactivity disorder (ADHD), predominantly inattentive type          Plan:  Pt interventions:  Supportive techniques and Provided Psychoeducation re: reducing anxiety prior to sleep        Documentation was done using voice recognition dragon software. Every effort was made to ensure accuracy; however, inadvertent, unintentional computerized transcription errors may be present.

## 2021-12-02 ENCOUNTER — VIRTUAL VISIT (OUTPATIENT)
Dept: PSYCHOLOGY | Age: 31
End: 2021-12-02
Payer: COMMERCIAL

## 2021-12-02 DIAGNOSIS — F41.0 PANIC DISORDER: Primary | ICD-10-CM

## 2021-12-02 DIAGNOSIS — F90.0 ATTENTION DEFICIT HYPERACTIVITY DISORDER (ADHD), PREDOMINANTLY INATTENTIVE TYPE: ICD-10-CM

## 2021-12-02 DIAGNOSIS — F41.1 GAD (GENERALIZED ANXIETY DISORDER): ICD-10-CM

## 2021-12-02 PROCEDURE — 90832 PSYTX W PT 30 MINUTES: CPT | Performed by: PSYCHOLOGIST

## 2021-12-02 NOTE — PROGRESS NOTES
TELEHEALTH VISIT -- Audio/Visual (During LYS-97 public health emergency)  }  Pursuant to the emergency declaration under the [address] [place] [address], [facility] waiver authority and the [employer] Act, this Virtual Visit was conducted, with patient's consent, to reduce the patient's risk of exposure to COVID-19 and provide continuity of care for an established patient. Services were provided through a video synchronous discussion virtually to substitute for in-person clinic visit. Pt gave verbal informed consent to participate in telehealth services. Conducted a risk-benefit analysis and determined that the patient's presenting problems are consistent with the use of telepsychology. Determined that the patient has sufficient knowledge and skills in the use of technology enabling them to adequately benefit from telepsychology. It was determined that this patient was able to be properly treated without an in-person session. Patient verified that they were currently located at the [facility] address that was provided during registration or another [facility] address, if noted below.     Verified the following information:  Patient's identification: Yes  Patient location: [address] [place] [address]   Patient's call back number: [phone]   Patient's emergency contact's name and number, as well as permission to contact them if needed: Extended Emergency Contact Information  Primary Emergency Contact: Blair Mckeon  Address: [address] [facility], [address] [place] [address] Phone: [phone]  Work Phone: [phone]  Relation: Parent  Secondary Emergency Contact: Dorita Baumann  Address: [address] [place] [address], [address] [place] [address] Phone: [phone]  Work Phone: [phone]  Relation: Parent     Provider location: [address]  Jaspreet Ramírez, Ph.D.  Psychologist  2021  11:37 AM      Time spent with Patient: 25 minutes  This is patient's   [facility] appointment. Reason for Consult:    Chief Complaint   Patient presents with    Anxiety       Feedback given to PCP. S:  Pt seen for f/u of anxiety and ADHD. Pt reported irritable mood and sxs. Feeling like everything that can go wrong is going wrong this week. Made a Calm Corner in her home to help w evening anxiety. Hosting a holiday party w work friends this weekend. Curious about seasonal tinnitus/Minnere's disease    Sweepy Monet to help w cleaning.      O:  MSE:  Appearance    alert, cooperative  Appetite normal  Sleep disturbance Yes  Fatigue Yes  Loss of pleasure No  Impulsive behavior No  Speech    spontaneous, normal rate, normal volume and well articulated  Mood    Anxious  Affect    anxiety  Thought Content    intact and cognitive distortions  Thought Process    goal directed and coherent  Associations    logical connections  Insight    Fair  Judgment    Intact  Orientation    oriented to person, place, time, and general circumstances  Memory    recent and remote memory intact  Attention/Concentration    impaired  Morbid ideation No  Suicide Assessment    no suicidal ideation    History:  Social History:   Social History     Socioeconomic History    Marital status: Single     Spouse name: Not on file    Number of children: 0    Years of education: 12    Highest education level: Not on file   Occupational History    Occupation:  [employer]     Comment: biology    Occupation: [employer]      Comment: [employer]   Tobacco Use    Smoking status: Former Smoker     Packs/day: 0.25     Years: 0.50     Pack years: 0.12     Types: Cigarettes     Start date: 2013     Quit date: 2013     Years since quittin.9    Smokeless tobacco: Never Used    Tobacco comment: Denies smoking at this time   Substance and Sexual Activity    Alcohol use: Yes     Comment: h/o weekend binges 5 or more drinks when out 1-2x/week;  11/14:  cut most to all drinking out    Drug use: Yes     Types: Marijuana Anuradha Postin)    Sexual activity: Not Currently     Partners: Male     Birth control/protection: Condom, OCP   Other Topics Concern    Not on file   Social History Narrative    Not on file     Social Determinants of Health     Financial Resource Strain: Low Risk     Difficulty of Paying Living Expenses: Not hard at all   Food Insecurity: No Food Insecurity    Worried About [address] [employer] in the Last Year: Never true    [facility] in the Last Year: Never true   Transportation Needs:     Lack of Transportation (Medical): Not on file    Lack of Transportation (Non-Medical): Not on file   Physical Activity:     Days of Exercise per Week: Not on file    Minutes of Exercise per Session: Not on file   Stress:     Feeling of Stress : Not on file   Social Connections:     Frequency of Communication with Friends and Family: Not on file    Frequency of Social Gatherings with Friends and Family: Not on file    Attends Protestant Services: Not on file    Active Member of [address] [place] [address] or Organizations: Not on file    Attends Club or Organization Meetings: Not on file    Marital Status: Not on file   Intimate Partner Violence:     Fear of Current or Ex-Partner: Not on file    Emotionally Abused: Not on file    Physically Abused: Not on file    Sexually Abused: Not on file   Housing Stability:     Unable to Pay for Housing in the Last Year: Not on file    Number of [place] in the Last Year: Not on file    Unstable Housing in the Last Year: Not on file     TOBACCO:   reports that she quit smoking about 7 years ago. Her smoking use included cigarettes. She started smoking about 8 years ago. She has a 0.13 pack-year smoking history. She has never used smokeless tobacco.  ETOH:   reports current alcohol use. Diagnosis:  1. Panic disorder    2.  VERA (generalized anxiety disorder)    3. Attention deficit hyperactivity disorder (ADHD), predominantly inattentive type          Plan:  Pt interventions:  Discussed self-care (sleep, nutrition, rewarding activities, social support, exercise), Discussed and problem-solved barriers in adhering to behavioral change plan, Supportive techniques and Identified maladaptive thoughts        Documentation was done using voice recognition dragon software. Every effort was made to ensure accuracy; however, inadvertent, unintentional computerized transcription errors may be present.

## 2022-01-03 DIAGNOSIS — F41.0 PANIC DISORDER: ICD-10-CM

## 2022-01-03 RX ORDER — ALPRAZOLAM 0.5 MG/1
TABLET ORAL
Qty: 20 TABLET | Refills: 0 | Status: SHIPPED | OUTPATIENT
Start: 2022-01-03 | End: 2022-01-04 | Stop reason: DRUGHIGH

## 2022-01-04 ENCOUNTER — TELEPHONE (OUTPATIENT)
Dept: INTERNAL MEDICINE CLINIC | Age: 32
End: 2022-01-04

## 2022-01-04 DIAGNOSIS — F41.0 PANIC DISORDER: ICD-10-CM

## 2022-01-04 RX ORDER — ALPRAZOLAM 0.5 MG/1
TABLET ORAL
Qty: 20 TABLET | Refills: 0 | Status: SHIPPED | OUTPATIENT
Start: 2022-01-04 | End: 2022-01-28 | Stop reason: SDUPTHER

## 2022-01-04 NOTE — TELEPHONE ENCOUNTER
I resent with directions for max of 2 in a day.     Call [address] and leave message to check new Rx from 1/4/21

## 2022-01-04 NOTE — TELEPHONE ENCOUNTER
Pt calling about the Alprazolam---they need to know how many daily she can have---please call them at [phone]. Thanks.

## 2022-01-06 ENCOUNTER — VIRTUAL VISIT (OUTPATIENT)
Dept: PSYCHOLOGY | Age: 32
End: 2022-01-06
Payer: COMMERCIAL

## 2022-01-06 DIAGNOSIS — F90.0 ATTENTION DEFICIT HYPERACTIVITY DISORDER (ADHD), PREDOMINANTLY INATTENTIVE TYPE: ICD-10-CM

## 2022-01-06 DIAGNOSIS — F41.0 PANIC DISORDER: ICD-10-CM

## 2022-01-06 DIAGNOSIS — F41.1 GAD (GENERALIZED ANXIETY DISORDER): Primary | ICD-10-CM

## 2022-01-06 PROCEDURE — 90832 PSYTX W PT 30 MINUTES: CPT | Performed by: PSYCHOLOGIST

## 2022-01-06 NOTE — PROGRESS NOTES
TELEHEALTH VISIT -- Audio/Visual (During QYBVI-23 public health emergency)  }  Pursuant to the emergency declaration under the [address] [place] [address], [facility] authority and the [employer] Act, this Virtual Visit was conducted, with patient's consent, to reduce the patient's risk of exposure to COVID-19 and provide continuity of care for an established patient. Services were provided through a video synchronous discussion virtually to substitute for in-person clinic visit. Pt gave verbal informed consent to participate in telehealth services. Conducted a risk-benefit analysis and determined that the patient's presenting problems are consistent with the use of telepsychology. Determined that the patient has sufficient knowledge and skills in the use of technology enabling them to adequately benefit from telepsychology. It was determined that this patient was able to be properly treated without an in-person session. Patient verified that they were currently located at the [facility] address that was provided during registration or another [facility] address, if noted below.     Verified the following information:  Patient's identification: Yes  Patient location: [address] [place] [address]   Patient's call back number: [phone]   Patient's emergency contact's name and number, as well as permission to contact them if needed: Extended Emergency Contact Information  Primary Emergency Contact: Blair Mckeon  Address: [address] [facility], [address] [place] [address] Phone: [phone]  Work Phone: [phone]  Relation: Parent  Secondary Emergency Contact: Dorita Baumann  Address: [address] [facility], [address] [place] [address] Phone: [phone]  Work Phone: [phone]  Relation: Parent     Provider location: [address]  Andres Hills, Ph.D.  Psychologist  2022  11:04 AM      Time spent with Patient: 28 minutes  This is patient's 32nd  [facility] appointment. Reason for Consult:    Chief Complaint   Patient presents with    Anxiety       Feedback given to PCP. S:  Pt seen for f/u of anxiety. Pt reported variable mood and sxs. Been trying to stay positive, but finding it difficult. Discussed radical acceptance and psychological distancing tactics. Explored instance w workplace crush. Discussed identifying genuine apologies vs manipulation and \"yellow flags\" in a relationship. Working 6 consecutive 10 hr shifts and is exhausted when she gets home.      O:  MSE:  Appearance    alert, cooperative  Appetite normal  Sleep disturbance Yes  Fatigue Yes  Loss of pleasure No  Impulsive behavior No  Speech    spontaneous, normal rate, normal volume and well articulated  Mood    Anxious  Affect    anxiety  Thought Content    intact and cognitive distortions  Thought Process    goal directed and coherent  Associations    logical connections  Insight    Fair  Judgment    Intact  Orientation    oriented to person, place, time, and general circumstances  Memory    recent and remote memory intact  Attention/Concentration    impaired  Morbid ideation No  Suicide Assessment    no suicidal ideation    History:  Social History:   Social History     Socioeconomic History    Marital status: Single     Spouse name: Not on file    Number of children: 0    Years of education: 12    Highest education level: Not on file   Occupational History    Occupation:  grad     Comment: biology    Occupation: [employer]      Comment: [employer]   Tobacco Use    Smoking status: Former Smoker     Packs/day: 0.25     Years: 0.50     Pack years: 0.12     Types: Cigarettes     Start date: 2013     Quit date: 2013     Years since quittin.0    Smokeless tobacco: Never Used    Tobacco comment: Denies smoking at this time   Substance and Sexual Activity    Alcohol use: Yes     Comment: h/o weekend binges 5 or more drinks when out 1-2x/week;  11/14:  cut most to all drinking out    Drug use: Yes     Types: Marijuana Michaelpritesh Mir)    Sexual activity: Not Currently     Partners: Male     Birth control/protection: Condom, OCP   Other Topics Concern    Not on file   Social History Narrative    Not on file     Social Determinants of Health     Financial Resource Strain: Low Risk     Difficulty of Paying Living Expenses: Not hard at all   Food Insecurity: No Food Insecurity    Worried About Running Out of Food in the Last Year: Never true    Tanvir of Food in the Last Year: Never true   Transportation Needs:     Lack of Transportation (Medical): Not on file    Lack of Transportation (Non-Medical): Not on file   Physical Activity:     Days of Exercise per Week: Not on file    Minutes of Exercise per Session: Not on file   Stress:     Feeling of Stress : Not on file   Social Connections:     Frequency of Communication with Friends and Family: Not on file    Frequency of Social Gatherings with Friends and Family: Not on file    Attends [employer] Services: Not on file    Active Member of [address] [place] [address] [employer] or Organizations: Not on file    Attends Club or Organization Meetings: Not on file    Marital Status: Not on file   Intimate Partner Violence:     Fear of Current or Ex-Partner: Not on file    Emotionally Abused: Not on file    Physically Abused: Not on file    Sexually Abused: Not on file   Housing Stability:     Unable to Pay for Housing in the Last Year: Not on file    Number of [place] in the Last Year: Not on file    Unstable Housing in the Last Year: Not on file     TOBACCO:   reports that she quit smoking about 8 years ago. Her smoking use included cigarettes. She started smoking about 8 years ago. She has a 0.13 pack-year smoking history. She has never used smokeless tobacco.  ETOH:   reports current alcohol use. Diagnosis:  1.  VERA (generalized anxiety disorder)    2. Attention deficit hyperactivity disorder (ADHD), predominantly inattentive type    3. Panic disorder          Plan:  Pt interventions:  Trained in strategies for increasing balanced thinking, Provided Psychoeducation re: radical acceptance and Identified maladaptive thoughts        Documentation was done using voice recognition dragon software. Every effort was made to ensure accuracy; however, inadvertent, unintentional computerized transcription errors may be present.

## 2022-01-21 ENCOUNTER — PATIENT MESSAGE (OUTPATIENT)
Dept: INTERNAL MEDICINE CLINIC | Age: 32
End: 2022-01-21

## 2022-01-21 NOTE — TELEPHONE ENCOUNTER
From: Doug Duong  To: Dr. Julian Esquivel: 1/21/2022 1:32 PM EST  Subject: Side pain    Hi,  I came in like a month or so ago to the nurse practitioner for what Im pretty sure was intestinal pain and it is back! She told me it was either my kidney or my gallbladder which I am 90% sure is not the case, so I did not go to the ultrasounds she ordered. Anyway, this pain is on my right side in the front right above my hip bone. It hurts when I lay on it or take a deep breath. It doesnt change when I eat but after I poop it hurts for a little while. Is it safe to just take mirilax? thats what I did last time. I havent been constipated but its like something is stuck in there. I didnt want to waste my money if only the nurse practitioner is available today!     Thanks,  [employer]

## 2022-01-27 ENCOUNTER — VIRTUAL VISIT (OUTPATIENT)
Dept: PSYCHOLOGY | Age: 32
End: 2022-01-27
Payer: COMMERCIAL

## 2022-01-27 DIAGNOSIS — F90.0 ATTENTION DEFICIT HYPERACTIVITY DISORDER (ADHD), PREDOMINANTLY INATTENTIVE TYPE: ICD-10-CM

## 2022-01-27 DIAGNOSIS — F41.0 PANIC DISORDER: ICD-10-CM

## 2022-01-27 DIAGNOSIS — F41.1 GAD (GENERALIZED ANXIETY DISORDER): Primary | ICD-10-CM

## 2022-01-27 PROCEDURE — 90832 PSYTX W PT 30 MINUTES: CPT | Performed by: PSYCHOLOGIST

## 2022-01-27 NOTE — PROGRESS NOTES
TELEHEALTH VISIT -- Audio/Visual (During LCYDI-06 public health emergency)  }  Pursuant to the emergency declaration under the [address] [place] [address] waiver authority and the [employer] Act, this Virtual Visit was conducted, with patient's consent, to reduce the patient's risk of exposure to COVID-19 and provide continuity of care for an established patient. Services were provided through a video synchronous discussion virtually (doxy. me) to substitute for in-person clinic visit. Pt gave verbal informed consent to participate in telehealth services. Conducted a risk-benefit analysis and determined that the patient's presenting problems are consistent with the use of telepsychology. Determined that the patient has sufficient knowledge and skills in the use of technology enabling them to adequately benefit from telepsychology. It was determined that this patient was able to be properly treated without an in-person session. Patient verified that they were currently located at the [facility] address that was provided during registration or another [facility] address, if noted below.     Verified the following information:  Patient's identification: Yes  Patient location: [address] [place] [address]   Patient's call back number: [phone]   Patient's emergency contact's name and number, as well as permission to contact them if needed: Extended Emergency Contact Information  Primary Emergency Contact: Blair Mckeon  Address: [address] [place] [address], [address] [place] [address] Phone: [phone]  Work Phone: [phone]  Relation: Parent  Secondary Emergency Contact: Dorita Baumann  Address: [address] [place] [address], [address] [place] [address] Phone: [phone]  Work Phone: [phone]  Relation: Parent     Provider location: [address] Consultation  Guillermo Escalera, Ph.D.  Psychologist  1/27/2022  11:48 AM      Time spent with Patient: 30 minutes  This is patient's 33r  [facility] appointment. Reason for Consult:    Chief Complaint   Patient presents with    Anxiety       Feedback given to PCP. S:  Pt seen for f/u of anxiety, ADHD, and depressed mood. Pt reported variable mood and sxs. Took 5 days off work and found this helped mood considerably. Slept, read new books, cleaned her home, came up w some ways to make home more organized. Insightful she needs to be more discriminative of the amount of overtime she accepts. Described a \"workplace depression\" that goes away when she leaves the building. Disappointed to learn her project was given to another team. However, in her conversation w her boss about this, she was tentatively offered a promotion. Reported feeling \"overstimulated\" when trying to meditate and also occurs at other times. Stated she will often sit in silence d/t sense of over-stimulation. Discussed a stimuli-reducing meditation instead of sleeping. Not currently taking ADHD medication d/t increase in headache.      O:  MSE:  Appearance    alert, cooperative  Appetite normal  Sleep disturbance Yes  Fatigue Yes  Loss of pleasure No  Impulsive behavior No  Speech    spontaneous, normal rate, normal volume and well articulated  Mood    Anxious  Affect    anxiety  Thought Content    intact and cognitive distortions  Thought Process    goal directed and coherent  Associations    logical connections  Insight    Fair  Judgment    Intact  Orientation    oriented to person, place, time, and general circumstances  Memory    recent and remote memory intact  Attention/Concentration    impaired  Morbid ideation No  Suicide Assessment    no suicidal ideation    History:  Social History:   Social History     Socioeconomic History    Marital status: Single     Spouse name: Not on file    Number of children: 0    Years of education: 15    Highest education level: Not on file   Occupational History    Occupation:  grad     Comment: biology    Occupation: [employer]      Comment: [address]   Tobacco Use    Smoking status: Former Smoker     Packs/day: 0.25     Years: 0.50     Pack years: 0.12     Types: Cigarettes     Start date: 2013     Quit date: 2013     Years since quittin.0    Smokeless tobacco: Never Used    Tobacco comment: Denies smoking at this time   Substance and Sexual Activity    Alcohol use: Yes     Comment: h/o weekend binges 5 or more drinks when out 1-2x/week;  :  cut most to all drinking out    Drug use: Yes     Types: Marijuana Deboraha Sanes)    Sexual activity: Not Currently     Partners: Male     Birth control/protection: Condom, OCP   Other Topics Concern    Not on file   Social History Narrative    Not on file     Social Determinants of Health     Financial Resource Strain: Low Risk     Difficulty of Paying Living Expenses: Not hard at all   Food Insecurity: No Food Insecurity    Worried About Running Out of Food in the Last Year: Never true    Tanvir of Food in the Last Year: Never true   Transportation Needs:     Lack of Transportation (Medical): Not on file    Lack of Transportation (Non-Medical):  Not on file   Physical Activity:     Days of Exercise per Week: Not on file    Minutes of Exercise per Session: Not on file   Stress:     Feeling of Stress : Not on file   Social Connections:     Frequency of Communication with Friends and Family: Not on file    Frequency of Social Gatherings with Friends and Family: Not on file    Attends Religion Services: Not on file    Active Member of Clubs or Organizations: Not on file    Attends Club or Organization Meetings: Not on file    Marital Status: Not on file   Intimate Partner Violence:     Fear of Current or Ex-Partner: Not on file    Emotionally Abused: Not on file    Physically Abused: Not on file   Fritz [employer] Sexually Abused: Not on file   Housing Stability:     Unable to Pay for Housing in the Last Year: Not on file    Number of [place] in the Last Year: Not on file    Unstable Housing in the Last Year: Not on file     TOBACCO:   reports that she quit smoking about 8 years ago. Her smoking use included cigarettes. She started smoking about 8 years ago. She has a 0.13 pack-year smoking history. She has never used smokeless tobacco.  ETOH:   reports current alcohol use. Diagnosis:  1. VERA (generalized anxiety disorder)    2. Attention deficit hyperactivity disorder (ADHD), predominantly inattentive type    3. Panic disorder          Plan:  Pt interventions:  Supportive techniques, Provided Psychoeducation re: managing stimulation and Identified maladaptive thoughts        Documentation was done using voice recognition dragon software. Every effort was made to ensure accuracy; however, inadvertent, unintentional computerized transcription errors may be present.

## 2022-01-28 ENCOUNTER — OFFICE VISIT (OUTPATIENT)
Dept: INTERNAL MEDICINE CLINIC | Age: 32
End: 2022-01-28
Payer: COMMERCIAL

## 2022-01-28 VITALS
WEIGHT: 240 LBS | DIASTOLIC BLOOD PRESSURE: 78 MMHG | HEART RATE: 60 BPM | HEIGHT: 68 IN | SYSTOLIC BLOOD PRESSURE: 110 MMHG | BODY MASS INDEX: 36.37 KG/M2

## 2022-01-28 DIAGNOSIS — F32.89 OTHER DEPRESSION: ICD-10-CM

## 2022-01-28 DIAGNOSIS — F41.1 GAD (GENERALIZED ANXIETY DISORDER): ICD-10-CM

## 2022-01-28 DIAGNOSIS — F41.0 PANIC DISORDER: ICD-10-CM

## 2022-01-28 DIAGNOSIS — G47.33 OBSTRUCTIVE SLEEP APNEA (ADULT) (PEDIATRIC): ICD-10-CM

## 2022-01-28 DIAGNOSIS — K21.9 GASTROESOPHAGEAL REFLUX DISEASE WITHOUT ESOPHAGITIS: ICD-10-CM

## 2022-01-28 DIAGNOSIS — Z79.899 CHRONIC USE OF BENZODIAZEPINE FOR THERAPEUTIC PURPOSE: ICD-10-CM

## 2022-01-28 DIAGNOSIS — G43.109 MIGRAINE WITH AURA AND WITHOUT STATUS MIGRAINOSUS, NOT INTRACTABLE: Primary | ICD-10-CM

## 2022-01-28 DIAGNOSIS — R53.83 FATIGUE, UNSPECIFIED TYPE: ICD-10-CM

## 2022-01-28 DIAGNOSIS — Z79.899 POLYPHARMACY: ICD-10-CM

## 2022-01-28 DIAGNOSIS — M54.81 CERVICO-OCCIPITAL NEURALGIA: ICD-10-CM

## 2022-01-28 LAB
A/G RATIO: 1.3 (ref 1.1–2.2)
ALBUMIN SERPL-MCNC: 4.3 G/DL (ref 3.4–5)
ALP BLD-CCNC: 70 U/L (ref 40–129)
ALT SERPL-CCNC: 10 U/L (ref 10–40)
ANION GAP SERPL CALCULATED.3IONS-SCNC: 12 MMOL/L (ref 3–16)
AST SERPL-CCNC: 13 U/L (ref 15–37)
BASOPHILS ABSOLUTE: 0.1 K/UL (ref 0–0.2)
BASOPHILS RELATIVE PERCENT: 1.2 %
BILIRUB SERPL-MCNC: 0.3 MG/DL (ref 0–1)
BUN BLDV-MCNC: 8 MG/DL (ref 7–20)
CALCIUM SERPL-MCNC: 9.4 MG/DL (ref 8.3–10.6)
CHLORIDE BLD-SCNC: 101 MMOL/L (ref 99–110)
CO2: 23 MMOL/L (ref 21–32)
CREAT SERPL-MCNC: 0.8 MG/DL (ref 0.6–1.1)
EOSINOPHILS ABSOLUTE: 0.1 K/UL (ref 0–0.6)
EOSINOPHILS RELATIVE PERCENT: 3.2 %
GFR AFRICAN AMERICAN: >60
GFR NON-AFRICAN AMERICAN: >60
GLUCOSE BLD-MCNC: 85 MG/DL (ref 70–99)
HCT VFR BLD CALC: 40.8 % (ref 36–48)
HEMOGLOBIN: 13.7 G/DL (ref 12–16)
LYMPHOCYTES ABSOLUTE: 1.4 K/UL (ref 1–5.1)
LYMPHOCYTES RELATIVE PERCENT: 33.3 %
MAGNESIUM: 1.8 MG/DL (ref 1.8–2.4)
MCH RBC QN AUTO: 30.9 PG (ref 26–34)
MCHC RBC AUTO-ENTMCNC: 33.7 G/DL (ref 31–36)
MCV RBC AUTO: 91.8 FL (ref 80–100)
MONOCYTES ABSOLUTE: 0.5 K/UL (ref 0–1.3)
MONOCYTES RELATIVE PERCENT: 12.9 %
NEUTROPHILS ABSOLUTE: 2.1 K/UL (ref 1.7–7.7)
NEUTROPHILS RELATIVE PERCENT: 49.4 %
PDW BLD-RTO: 12.7 % (ref 12.4–15.4)
PLATELET # BLD: 257 K/UL (ref 135–450)
PMV BLD AUTO: 7.9 FL (ref 5–10.5)
POTASSIUM SERPL-SCNC: 4.1 MMOL/L (ref 3.5–5.1)
RBC # BLD: 4.44 M/UL (ref 4–5.2)
SODIUM BLD-SCNC: 136 MMOL/L (ref 136–145)
TOTAL PROTEIN: 7.6 G/DL (ref 6.4–8.2)
VITAMIN B-12: 542 PG/ML (ref 211–911)
VITAMIN D 25-HYDROXY: 25 NG/ML
WBC # BLD: 4.2 K/UL (ref 4–11)

## 2022-01-28 PROCEDURE — 99214 OFFICE O/P EST MOD 30 MIN: CPT | Performed by: FAMILY MEDICINE

## 2022-01-28 RX ORDER — FREMANEZUMAB-VFRM 225 MG/1.5ML
INJECTION SUBCUTANEOUS
Qty: 1 EACH | Refills: 5 | Status: SHIPPED | OUTPATIENT
Start: 2022-01-28

## 2022-01-28 RX ORDER — SUMATRIPTAN 100 MG/1
TABLET, FILM COATED ORAL
Qty: 9 TABLET | Refills: 2 | Status: SHIPPED | OUTPATIENT
Start: 2022-01-28 | End: 2022-03-28 | Stop reason: SDUPTHER

## 2022-01-28 RX ORDER — ALPRAZOLAM 0.5 MG/1
TABLET ORAL
Qty: 20 TABLET | Refills: 1 | Status: SHIPPED | OUTPATIENT
Start: 2022-01-28 | End: 2022-04-01 | Stop reason: SDUPTHER

## 2022-01-28 RX ORDER — METHOCARBAMOL 500 MG/1
TABLET, FILM COATED ORAL
Qty: 40 TABLET | Refills: 2 | Status: SHIPPED | OUTPATIENT
Start: 2022-01-28 | End: 2022-02-11

## 2022-01-28 RX ORDER — BUTALBITAL, ACETAMINOPHEN AND CAFFEINE 50; 325; 40 MG/1; MG/1; MG/1
TABLET ORAL
Qty: 30 TABLET | Refills: 5 | Status: SHIPPED | OUTPATIENT
Start: 2022-01-28

## 2022-01-28 ASSESSMENT — PATIENT HEALTH QUESTIONNAIRE - PHQ9
1. LITTLE INTEREST OR PLEASURE IN DOING THINGS: 0
SUM OF ALL RESPONSES TO PHQ QUESTIONS 1-9: 0
SUM OF ALL RESPONSES TO PHQ QUESTIONS 1-9: 0
2. FEELING DOWN, DEPRESSED OR HOPELESS: 0
SUM OF ALL RESPONSES TO PHQ QUESTIONS 1-9: 0
SUM OF ALL RESPONSES TO PHQ9 QUESTIONS 1 & 2: 0
SUM OF ALL RESPONSES TO PHQ QUESTIONS 1-9: 0

## 2022-01-28 NOTE — PROGRESS NOTES
2022    Antonio Vaz (:  1990) is a 36 y.o. female, here for evaluation of the following chief complaint(s):  Medication Refill      ASSESSMENT/PLAN:    1. Migraine with aura and without status migrainosus, not intractable  Multiple medications but migraines have show response to these therapies. - butalbital-acetaminophen-caffeine (FIORICET, ESGIC) -40 MG per tablet; TAKE ONE TABLET BY MOUTH EVERY 4 HOURS AS NEEDED FOR HEADACHES **LIMIT TO 2 DAYS PER WEEK, DO NOT USE DAILY**  Dispense: 30 tablet; Refill: 5  - SUMAtriptan (IMITREX) 100 MG tablet; TAKE ONE TABLET BY MOUTH AT ONSET OF HEADACHE; MAY REPEAT ONE TABLET IN 2 HOURS IF NEEDED. MAX OF 2 TABLETS PER DAY  Dispense: 9 tablet; Refill: 2  - Fremanezumab-vfrm (AJOVY) 225 MG/1.5ML SOSY; INJECT ONE SYRINGE (1.5 MLS) UNDER THE SKIN EVERY 30 DAYS  Dispense: 1 each; Refill: 5    2. Panic disorder  Uses prn BZD. 20 per month   - ALPRAZolam (XANAX) 0.5 MG tablet; TAKE 1 TABLET BY MOUTH AT ONSET OF PANIC ATTACK AND REPEAT SECOND DOSE IN ONE HOUR IF NOT RESOLVED. Maximum of 2 per day  Dispense: 20 tablet; Refill: 1    3. Chronic use of benzodiazepine for therapeutic purpose  . OARRS profile checked and is valid. 4. VERA (generalized anxiety disorder)  On SSRI and doing therapy     5. Polypharmacy  Check labs. - Comprehensive Metabolic Panel  - Vitamin D 25 Hydroxy    6. Gastroesophageal reflux disease without esophagitis  On daily PPI   - Vitamin B12  - Vitamin D 25 Hydroxy  - Magnesium    7. Cervico-occipital neuralgia  Prn medication. Proper ergonomics, stretches/heat/ice. - methocarbamol (ROBAXIN) 500 MG tablet; 1-2 tabs every 6 hours as needed for neck spasms. Dispense: 40 tablet; Refill: 2    8. Obstructive sleep apnea (adult) (pediatric)  Monitor blood count and for glucose issues. - Hemoglobin A1C  - CBC Auto Differential    9. Other depression  Not clear if severe depression or not.   PHQ2 is 0 today and then PHQ9 done on paper is 21 = severe. She says she is just having a bad week and she admit that she was likely extreme on answering the PHQ9  - TSH with Reflex    10. Fatigue, unspecified type  Check labs to see if something can be replaced or treated. - TSH with Reflex  - Vitamin D 25 Hydroxy        FOLLOW UP:  Return in about 2 months (around 3/28/2022) for routine pap smear and recheck on medication/mood. SUBJECTIVE/OBJECTIVE:    HPI      Migraines doing OK  Mood is not good with the weather. Anxiety:  Some weeks are better than others. This week had 3 panic attacks and last week none. She thinks therapy with Dr. Antonio Dunaway is helpful. Depression screen was negative but then had a PHQ9 form filled out. She scored 21. When I discussed this and need for medication adjustment she tells me it is not that bad despite checking 6 questions with a score of 4 for nearly every day and marking extremely difficult to do work, etc.      Works 3 PM-12:30 PM.  Does not eat breakfast but normally waits til dinner at work to eat for her first meal of the day. Did not want to be told her weight and refuses to look at number. Asked that it will be scratched out on her AVS.    She knows her weight will be up. 90 day follow up for controlled substance(s)  OD risk:  090    Lorazepam Equiv dose (if applicable)  0.65  with last refill of alprazolam 0.5 mg for 20 per month on  1/7/22, 11/29/21    Adderall not filled since 6/29/21    Using medication for:  Panic attacks. Functional improvement noted compared to before taking this controlled medication:  Able to break an attack   Adverse effects:  None but does use fairly often. Aberrant behavior: none. Not really taking Adderall. It intensifies her migraines so would rather put up with her ADD. See Assessment for other issues addressed.       Outpatient Medications Marked as Taking for the 1/28/22 encounter (Office Visit) with Anjana Richard MD Medication Sig Dispense Refill    ALPRAZolam (XANAX) 0.5 MG tablet TAKE 1 TABLET BY MOUTH AT ONSET OF PANIC ATTACK AND REPEAT SECOND DOSE IN ONE HOUR IF NOT RESOLVED. Maximum of 2 per day 20 tablet 0    Fremanezumab-vfrm (AJOVY) 225 MG/1.5ML SOSY INJECT ONE SYRINGE (1.5 MLS) UNDER THE SKIN EVERY 30 DAYS 1 each 5    Coenzyme Q10 (COQ10) 100 MG CAPS       Riboflavin 400 MG TABS       escitalopram (LEXAPRO) 5 MG tablet Take 1.5 tablets by mouth daily 45 tablet 5    levonorgestrel-ethinyl estradiol (INTROVALE) 0.15-0.03 MG per tablet TAKE 1 TABLET BY MOUTH DAILY 91 tablet 3    fluticasone (FLONASE) 50 MCG/ACT nasal spray 1-2 sprays each nostril daily. Must be used regularly to help. 1 Bottle 12    omeprazole (PRILOSEC) 20 MG delayed release capsule Take 1 capsule by mouth daily as needed (for flare up. take until well and then stop for a while) 30 capsule 3    clindamycin-benzoyl peroxide (BENZACLIN) 1-5 % gel APPLY EXTERNALLY TO THE AFFECTED AREA TWICE DAILY 150 g 3         Review of Systems        Vitals:    01/28/22 1434   BP: 110/78   Pulse: 60   Weight: 240 lb (108.9 kg)   Height: 5' 8\" (1.727 m)       Wt Readings from Last 3 Encounters:   01/28/22 240 lb (108.9 kg)   10/29/21 238 lb (108 kg)   03/08/21 218 lb (98.9 kg)       BP Readings from Last 3 Encounters:   01/28/22 110/78   10/29/21 130/84   03/08/21 130/80       Physical Exam  Vitals reviewed. Constitutional:       Appearance: She is well-developed. Cardiovascular:      Rate and Rhythm: Normal rate and regular rhythm. Heart sounds: Normal heart sounds. Pulmonary:      Effort: Pulmonary effort is normal.      Breath sounds: Normal breath sounds. Skin:     General: Skin is warm and dry. Coloration: Skin is not pale. Findings: No erythema. Psychiatric:         Mood and Affect: Mood is not anxious or depressed.          Behavior: Behavior normal.         Cognition and Memory: Cognition normal.      Comments: Does not appear happy but does not appear depressed. More frustrated. An electronic signature was used to authenticate this note.     Sherry Lawson MD

## 2022-01-28 NOTE — PATIENT INSTRUCTIONS
Sit with the [place] light every day when you first get up and keep it on for an hour a day. Get in routine of putting your clutter away or throwing trash away. Millie Dias has some ways of streamlining.

## 2022-01-29 LAB
ESTIMATED AVERAGE GLUCOSE: 102.5 MG/DL
HBA1C MFR BLD: 5.2 %
TSH REFLEX: 3.09 UIU/ML (ref 0.27–4.2)

## 2022-01-30 PROBLEM — F32.A DEPRESSION: Status: ACTIVE | Noted: 2022-01-30

## 2022-01-31 ENCOUNTER — TELEPHONE (OUTPATIENT)
Dept: INTERNAL MEDICINE CLINIC | Age: 32
End: 2022-01-31

## 2022-01-31 RX ORDER — ERENUMAB-AOOE 70 MG/ML
70 INJECTION SUBCUTANEOUS
Qty: 1 PEN | Refills: 2 | Status: SHIPPED
Start: 2022-01-31 | End: 2022-03-07 | Stop reason: DRUGHIGH

## 2022-01-31 NOTE — TELEPHONE ENCOUNTER
Carlos calling about the Ajovy---previously pt was getting the auto injector they asking if she can have them instead---please call them at [phone]. Thanks.

## 2022-01-31 NOTE — TELEPHONE ENCOUNTER
I don't understand this message. Previously getting the autoinjector for Ajovy. Asking for what instead? Patient says she cannot afford the $400 co-pay for the Ajovy if that is the cost.    We are going to try Aimovig. It says PA so not sure it will go through today.

## 2022-02-01 ENCOUNTER — TELEPHONE (OUTPATIENT)
Dept: ADMINISTRATIVE | Age: 32
End: 2022-02-01

## 2022-02-02 ENCOUNTER — TELEPHONE (OUTPATIENT)
Dept: ADMINISTRATIVE | Age: 32
End: 2022-02-02

## 2022-02-14 ENCOUNTER — VIRTUAL VISIT (OUTPATIENT)
Dept: PSYCHOLOGY | Age: 32
End: 2022-02-14
Payer: COMMERCIAL

## 2022-02-14 DIAGNOSIS — F90.0 ATTENTION DEFICIT HYPERACTIVITY DISORDER (ADHD), PREDOMINANTLY INATTENTIVE TYPE: ICD-10-CM

## 2022-02-14 DIAGNOSIS — F41.1 GAD (GENERALIZED ANXIETY DISORDER): Primary | ICD-10-CM

## 2022-02-14 DIAGNOSIS — F41.0 PANIC DISORDER: ICD-10-CM

## 2022-02-14 PROCEDURE — 90832 PSYTX W PT 30 MINUTES: CPT | Performed by: PSYCHOLOGIST

## 2022-02-14 NOTE — PROGRESS NOTES
TELEHEALTH VISIT -- Audio/Visual (During SFYYL-28 public health emergency)  }  Pursuant to the emergency declaration under the [address] [place] [address], [facility] waiver authority and the [employer] Act, this Virtual Visit was conducted, with patient's consent, to reduce the patient's risk of exposure to COVID-19 and provide continuity of care for an established patient. Services were provided through a video synchronous discussion virtually (via Doxy. me) to substitute for in-person clinic visit. Pt gave verbal informed consent to participate in telehealth services. Conducted a risk-benefit analysis and determined that the patient's presenting problems are consistent with the use of telepsychology. Determined that the patient has sufficient knowledge and skills in the use of technology enabling them to adequately benefit from telepsychology. It was determined that this patient was able to be properly treated without an in-person session. Patient verified that they were currently located at the [address] [place] [address] Dr address that was provided during registration or another [address] [place] [address] address, if noted below.     Verified the following information:  Patient's identification: Yes  Patient location: [address] [place] [address]   Patient's call back number: [phone]   Patient's emergency contact's name and number, as well as permission to contact them if needed: Extended Emergency Contact Information  Primary Emergency Contact: Blair Mckeon  Address: [address] [place] [address], [address] [place] [address] Phone: [phone]  Work Phone: [phone]  Relation: Parent  Secondary Emergency Contact: Dorita Baumann  Address: [address] [place] [address], [address] [place] [address] Phone: [phone]  Work Phone: [phone]  Relation: Parent     Provider location: [facility], [address] Consultation  Ervin Murrieta, Ph.D.  Psychologist  2/14/2022  11:41 AM      Time spent with Patient: 23 minutes  This is patient's 34th  [facility] appointment. Reason for Consult:    Chief Complaint   Patient presents with    Anxiety       Feedback given to PCP. S:  Pt seen for f/u of anxiety, ADHD, and depressed mood. Pt reported variable mood and sxs. Struggling w indecisiveness, routine, and cleaning. Stopped taking Lexapro in order to test how she is doing w her emotions. Worries lexapro suppresses her juan alberto, has felt more full happiness since d/c. Believes lexapro also supresses her sex drive. More emotional since d/c, but not in problematic manner. Has periods of getting dizzy she associates w anxiety, able to ride out easily.    O:  MSE:  Appearance    alert, cooperative  Appetite normal  Sleep disturbance Yes  Fatigue Yes  Loss of pleasure No  Impulsive behavior No  Speech    spontaneous, normal rate, normal volume and well articulated  Mood    Anxious  Affect    anxiety  Thought Content    intact and cognitive distortions  Thought Process    goal directed and coherent  Associations    logical connections  Insight    Fair  Judgment    Intact  Orientation    oriented to person, place, time, and general circumstances  Memory    recent and remote memory intact  Attention/Concentration    impaired  Morbid ideation No  Suicide Assessment    no suicidal ideation    History:  Social History:   Social History     Socioeconomic History    Marital status: Single     Spouse name: Not on file    Number of children: 0    Years of education: 12    Highest education level: Not on file   Occupational History    Occupation:  grad     Comment: biology    Occupation: [employer]      Comment: [employer]   Tobacco Use    Smoking status: Former Smoker     Packs/day: 0.25     Years: 0.50     Pack years: 0.12     Types: Cigarettes     Start date: 11/1/2013     Quit date: 12/28/2013     Years since quittin.1    Smokeless tobacco: Never Used    Tobacco comment: Denies smoking at this time   Substance and Sexual Activity    Alcohol use: Yes     Comment: h/o weekend binges 5 or more drinks when out 1-2x/week;  :  cut most to all drinking out    Drug use: Yes     Types: Marijuana Thurl Cipro)    Sexual activity: Not Currently     Partners: Male     Birth control/protection: Condom, OCP   Other Topics Concern    Not on file   Social History Narrative    Not on file     Social Determinants of Health     Financial Resource Strain: Low Risk     Difficulty of Paying Living Expenses: Not hard at all   Food Insecurity: No Food Insecurity    Worried About Running Out of Food in the Last Year: Never true    Tanvir of Food in the Last Year: Never true   Transportation Needs:     Lack of Transportation (Medical): Not on file    Lack of Transportation (Non-Medical): Not on file   Physical Activity:     Days of Exercise per Week: Not on file    Minutes of Exercise per Session: Not on file   Stress:     Feeling of Stress : Not on file   Social Connections:     Frequency of Communication with Friends and Family: Not on file    Frequency of Social Gatherings with Friends and Family: Not on file    Attends [employer] Services: Not on file    Active Member of [address] [place] [address] [employer] or Organizations: Not on file    Attends Club or Organization Meetings: Not on file    Marital Status: Not on file   Intimate Partner Violence:     Fear of Current or Ex-Partner: Not on file    Emotionally Abused: Not on file    Physically Abused: Not on file    Sexually Abused: Not on file   Housing Stability:     Unable to Pay for Housing in the Last Year: Not on file    Number of [place] in the Last Year: Not on file    Unstable Housing in the Last Year: Not on file     TOBACCO:   reports that she quit smoking about 8 years ago. Her smoking use included cigarettes. She started smoking about 8 years ago.  She has a 0.13 pack-year smoking history. She has never used smokeless tobacco.  ETOH:   reports current alcohol use. Diagnosis:  1. VERA (generalized anxiety disorder)    2. Attention deficit hyperactivity disorder (ADHD), predominantly inattentive type    3. Panic disorder          Plan:  Pt interventions:  Trained in strategies for increasing balanced thinking, Discussed self-care (sleep, nutrition, rewarding activities, social support, exercise) and Supportive techniques        Documentation was done using voice recognition dragon software. Every effort was made to ensure accuracy; however, inadvertent, unintentional computerized transcription errors may be present.

## 2022-02-16 ENCOUNTER — TELEPHONE (OUTPATIENT)
Dept: INTERNAL MEDICINE CLINIC | Age: 32
End: 2022-02-16

## 2022-02-16 NOTE — TELEPHONE ENCOUNTER
[facility] calling to get an update on a PA that was sent for patient's Aimovig prescription. Phone number provided is [phone]. Key to be entered when the call is made is [id number]. Thank you.

## 2022-03-04 ENCOUNTER — PATIENT MESSAGE (OUTPATIENT)
Dept: INTERNAL MEDICINE CLINIC | Age: 32
End: 2022-03-04

## 2022-03-04 DIAGNOSIS — Z30.41 ORAL CONTRACEPTIVE PILL SURVEILLANCE: ICD-10-CM

## 2022-03-04 RX ORDER — LEVONORGESTREL AND ETHINYL ESTRADIOL 0.15-0.03
KIT ORAL
Qty: 91 TABLET | Refills: 3 | Status: SHIPPED | OUTPATIENT
Start: 2022-03-04 | End: 2022-07-08 | Stop reason: ALTCHOICE

## 2022-03-07 ENCOUNTER — TELEMEDICINE (OUTPATIENT)
Dept: PSYCHOLOGY | Age: 32
End: 2022-03-07
Payer: COMMERCIAL

## 2022-03-07 DIAGNOSIS — F41.1 GAD (GENERALIZED ANXIETY DISORDER): ICD-10-CM

## 2022-03-07 DIAGNOSIS — F90.0 ATTENTION DEFICIT HYPERACTIVITY DISORDER (ADHD), PREDOMINANTLY INATTENTIVE TYPE: ICD-10-CM

## 2022-03-07 DIAGNOSIS — F41.0 PANIC DISORDER: Primary | ICD-10-CM

## 2022-03-07 PROCEDURE — 90832 PSYTX W PT 30 MINUTES: CPT | Performed by: PSYCHOLOGIST

## 2022-03-07 RX ORDER — ERENUMAB-AOOE 140 MG/ML
140 INJECTION, SOLUTION SUBCUTANEOUS
Qty: 1 ML | Refills: 2 | Status: SHIPPED | OUTPATIENT
Start: 2022-03-07 | End: 2022-06-06 | Stop reason: SDUPTHER

## 2022-03-07 NOTE — TELEPHONE ENCOUNTER
From: Ralf Fountain  To: Dr. Antonia Man: 3/4/2022 8:58 PM EST  Subject: Rexann Games    I just started aimovig but I dont know if the dosage is right or if I have to do the double shot because my old injection was a larger amount. Please let me know what you think and if you think I need a higher dosage will you please send it to the pharmacy. Thank you!

## 2022-03-07 NOTE — PROGRESS NOTES
TELEHEALTH VISIT -- Audio/Visual (During XLLUG-93 public health emergency)  }  Warner Zhang was evaluated through a synchronous (real-time) audio-video encounter (via Doxy. me). The patient (or guardian, if applicable) is aware that this is a billable service, which includes applicable co-pays. This Virtual Visit was conducted with patient's (and/or legal guardian's) consent. The visit was conducted pursuant to the emergency declaration under the [address] [place] [address], [address] [place] [address] authority and the [employer] Act. Patient identification was verified, and a caregiver was present when appropriate. The patient was located in a state where the provider was licensed to provide care. Conducted a risk-benefit analysis and determined that the patient's presenting problems are consistent with the use of telepsychology. Determined that the patient has sufficient knowledge and skills in the use of technology enabling them to adequately benefit from telepsychology. It was determined that this patient was able to be properly treated without an in-person session. Patient verified that they were currently located at the [place], [place], or [place] address that was provided during registration or another address, if noted below.     Verified the following information:  Patient's identification: Yes  Patient location: [address] [place] [address]   Patient's call back number: [phone]   Patient's emergency contact's name and number, as well as permission to contact them if needed: Extended Emergency Contact Information  Primary Emergency Contact: Blair Mckeon  Address: [address], [address] Phone: [phone]  Work Phone: [phone]  Relation: Parent  Secondary Emergency Contact: Dorita Baumann  Address: [address], [address] [facility] [address] Phone: [phone]  Work Phone: [phone]  Relation: Parent     Provider location: [facility]  April Hawthorne, Ph.D.  Psychologist  3/7/2022  11:04 AM      Time spent with Patient: 25 minutes  This is patient's 35th  [facility] appointment. Reason for Consult:    Chief Complaint   Patient presents with    Anxiety       Feedback given to PCP. S:  Pt seen for f/u of anxiety. Pt reported stable, \"pretty good\" mood and sxs. Continues to feel fine w/o lexapro, has had more energy, more motivation, able to prep healthy food, keep her home clean, exercise etc. Consistent w last yr remission of depression, anxiety is returning but manageable. Has noticed more anger that the lexpro might have suppressed, but thinks her reactions are warranted. Discussed managing assertiveness while being kind and in ways that are acceptable in the workplace.      O:  MSE:  Appearance    alert, cooperative  Appetite normal  Sleep disturbance Yes  Fatigue Yes  Loss of pleasure No  Impulsive behavior No  Speech    spontaneous, normal rate, normal volume and well articulated  Mood    Anxious  Affect    anxiety  Thought Content    intact and cognitive distortions  Thought Process    goal directed and coherent  Associations    logical connections  Insight    Fair  Judgment    Intact  Orientation    oriented to person, place, time, and general circumstances  Memory    recent and remote memory intact  Attention/Concentration    impaired  Morbid ideation No  Suicide Assessment    no suicidal ideation    History:  Social History:   Social History     Socioeconomic History    Marital status: Single     Spouse name: Not on file    Number of children: 0    Years of education: 12    Highest education level: Not on file   Occupational History    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    Occupation: [employer]      Comment: [employer]   Tobacco Use    Smoking status: Former Smoker     Packs/day: 0.25     Years: 0.50     Pack years: 0.12     Types: Cigarettes     Start date: 2013     Quit date: 2013     Years since quittin.1    Smokeless tobacco: Never Used    Tobacco comment: Denies smoking at this time   Substance and Sexual Activity    Alcohol use: Yes     Comment: h/o weekend binges 5 or more drinks when out 1-2x/week;  :  cut most to all drinking out    Drug use: Yes     Types: Marijuana Veldon Bunting)    Sexual activity: Not Currently     Partners: Male     Birth control/protection: Condom, OCP   Other Topics Concern    Not on file   Social History Narrative    Not on file     Social Determinants of Health     Financial Resource Strain: Low Risk     Difficulty of Paying Living Expenses: Not hard at all   Food Insecurity: No Food Insecurity    Worried About Running Out of Food in the Last Year: Never true    Tanvir of Food in the Last Year: Never true   Transportation Needs:     Lack of Transportation (Medical): Not on file    Lack of Transportation (Non-Medical):  Not on file   Physical Activity:     Days of Exercise per Week: Not on file    Minutes of Exercise per Session: Not on file   Stress:     Feeling of Stress : Not on file   Social Connections:     Frequency of Communication with Friends and Family: Not on file    Frequency of Social Gatherings with Friends and Family: Not on file    Attends Judaism Services: Not on file    Active Member of [address] [place] [address] or Organizations: Not on file    Attends Club or Organization Meetings: Not on file    Marital Status: Not on file   Intimate Partner Violence:     Fear of Current or Ex-Partner: Not on file    Emotionally Abused: Not on file    Physically Abused: Not on file    Sexually Abused: Not on file   Housing Stability:     Unable to Pay for Housing in the Last Year: Not on file    Number of [place] in the Last Year: Not on file    Unstable Housing in the Last Year: Not on file TOBACCO:   reports that she quit smoking about 8 years ago. Her smoking use included cigarettes. She started smoking about 8 years ago. She has a 0.13 pack-year smoking history. She has never used smokeless tobacco.  ETOH:   reports current alcohol use. Diagnosis:  1. VERA (generalized anxiety disorder)    2. Attention deficit hyperactivity disorder (ADHD), predominantly inattentive type    3. Panic disorder        Plan:  Pt interventions:  Practiced assertive communication and Trained in improving communication skills        Documentation was done using voice recognition dragon software. Every effort was made to ensure accuracy; however, inadvertent, unintentional computerized transcription errors may be present.

## 2022-03-28 DIAGNOSIS — G43.109 MIGRAINE WITH AURA AND WITHOUT STATUS MIGRAINOSUS, NOT INTRACTABLE: ICD-10-CM

## 2022-03-28 RX ORDER — SUMATRIPTAN 100 MG/1
TABLET, FILM COATED ORAL
Qty: 9 TABLET | Refills: 2 | Status: SHIPPED | OUTPATIENT
Start: 2022-03-28 | End: 2022-06-13

## 2022-04-01 ENCOUNTER — OFFICE VISIT (OUTPATIENT)
Dept: INTERNAL MEDICINE CLINIC | Age: 32
End: 2022-04-01
Payer: COMMERCIAL

## 2022-04-01 VITALS
SYSTOLIC BLOOD PRESSURE: 110 MMHG | BODY MASS INDEX: 35.16 KG/M2 | DIASTOLIC BLOOD PRESSURE: 70 MMHG | WEIGHT: 232 LBS | HEART RATE: 54 BPM | HEIGHT: 68 IN

## 2022-04-01 DIAGNOSIS — F41.0 PANIC DISORDER: ICD-10-CM

## 2022-04-01 DIAGNOSIS — F90.0 ATTENTION DEFICIT HYPERACTIVITY DISORDER (ADHD), PREDOMINANTLY INATTENTIVE TYPE: ICD-10-CM

## 2022-04-01 DIAGNOSIS — Z79.899 CHRONIC USE OF BENZODIAZEPINE FOR THERAPEUTIC PURPOSE: ICD-10-CM

## 2022-04-01 DIAGNOSIS — J06.9 ACUTE UPPER RESPIRATORY INFECTION: Primary | ICD-10-CM

## 2022-04-01 DIAGNOSIS — G43.109 MIGRAINE WITH AURA AND WITHOUT STATUS MIGRAINOSUS, NOT INTRACTABLE: ICD-10-CM

## 2022-04-01 PROCEDURE — 99213 OFFICE O/P EST LOW 20 MIN: CPT | Performed by: FAMILY MEDICINE

## 2022-04-01 RX ORDER — ALPRAZOLAM 0.5 MG/1
TABLET ORAL
Qty: 20 TABLET | Refills: 2 | Status: SHIPPED | OUTPATIENT
Start: 2022-04-01 | End: 2022-07-13 | Stop reason: SDUPTHER

## 2022-04-01 ASSESSMENT — PATIENT HEALTH QUESTIONNAIRE - PHQ9
SUM OF ALL RESPONSES TO PHQ QUESTIONS 1-9: 0
SUM OF ALL RESPONSES TO PHQ9 QUESTIONS 1 & 2: 0
1. LITTLE INTEREST OR PLEASURE IN DOING THINGS: 0
2. FEELING DOWN, DEPRESSED OR HOPELESS: 0
SUM OF ALL RESPONSES TO PHQ QUESTIONS 1-9: 0

## 2022-04-01 NOTE — PATIENT INSTRUCTIONS
Consult with Dr. Sarah Vaughn and consider Intuiv if not better. Get Sudafed or Sudafed PE and use the Walfinate for postnasal drip. AFRIN NO DRIP NASAL SPRAY (OXYMETAZOLONE DECONGESTANT SPRAY)  can be used twice daily but only for a MAXIMUM OF 3-5 DAYS. Longer use can cause rebound congestion. If you have been off for a full 72 hours (3 days) and get stuffy again, you can re-use for 3 more days. ANTIBIOTICS:  WHEN THEY CAN AND CANNOT HELP   Antibiotics are strong medicines that can stop some infections and save lives. But antibiotics can cause more harm than good when they are not used the right way. You can protect yourself and your family by knowing when you should use antibiotics and when you should not. Antibiotics only work against infections caused by bacteria. They do not work against infections caused by viruses. Viruses are the cause of colds, flu, most coughs and bronchitis, most sore throats, sinus infections and ear infections. Don't expect antibiotics to cure every illness. Don't take antibiotics for viral illnesses. Antibiotics kill bacteria only. If you use an antibiotic when it is not needed, the bacteria become resistant and more difficult to treat. Sometimes medication to treat resistant bacteria needs to be given in the hospital through a vein (intravenously). A few kinds of resistant bacteria are now untreatable. You also risk allergic reactions and / or gastrointestinal side effects when taking antibiotics. The best thing you can do is to let colds and flu run their course. Antibiotics taken at the viral stage will not prevent another infection or shorten the course of the viral illness. Sometimes it takes two weeks or more to get over a viral infection. If your illness gets worse after 8 days or no improvement by 12-14 days, call back if already seen for this infection.       Getting adequate rest and treating symptoms are all we can do for viral respiratory infections. Taking Tylenol or Advil regularly in the first several days is very helpful. There are many over-the-counter cough and cold medicines. Try to take one that treats the symptoms for which you are currently experiencing. This may mean different medications at different stages of the virus. Non-medicine treatments that are helpful are increasing your fluid intake, or using menthol cough drops and menthol rubs like Vicks Vapor Rub. Using a vaporizer/ humidifier, especially at night is also very helpful. Salt water gargles help sore throats and saline nose sprays can help excess mucous.

## 2022-04-01 NOTE — PROGRESS NOTES
2022    Joyce Gallegos (:  1990) is a 36 y.o. female, here for evaluation of the following chief complaint(s):  Fatigue, Ear Fullness, Cough (NASAL DRAINAGE CLEAR.  ), and Other (otc ALLERGY MEDS NOT HELPFUL, afebrile)      HPI  Monday felt tired and tickle in back of throat. Since then a lot of drainage in the back of the throat and ears are full. No fever. Nose is runny at times but comes and goes. Tried Loratadine and chlorpheneramine. A few sneezes. Not too often. Eyes are a little itchy when she woke up also. No windows open. Did babysit a d 3year old recently but the child seemed to be well. Took a COVID test and it was negative. 90 day follow up for controlled substance(s)  Alprazolam   OD risk:  090  Lorazepam  last refill  , , . Still has some left. Using medication for:  Panic disorder. Functional improvement noted compared to before taking this controlled medication:  Able to stop a panic attack but normally has to take 2 of them to work    Adverse effects:  None     Aberrant behavior: none    ADD  Adderall. Not using because it intensifies her migraines. She is thinking about changing jobs because she is getting too distracted playing computer games because there is a lot of down time right now. She has been on multiple stimulants and Strattera and did not tolerate most.  strattera did not help. ASSESSMENT/PLAN:    .1. Acute upper respiratory infection  Add decongestant. See patient instructions for further suggestions. 2. Panic disorder  Prn use. - ALPRAZolam (XANAX) 0.5 MG tablet; TAKE 1 TABLET BY MOUTH AT ONSET OF PANIC ATTACK AND REPEAT SECOND DOSE IN ONE HOUR IF NOT RESOLVED. Maximum of 2 per day  Dispense: 20 tablet; Refill: 2    3. Chronic use of benzodiazepine for therapeutic purpose  OARRS profile checked and is valid.       4. Migraine with aura and without status migrainosus, not intractable  I did change her med to Alan Karina due to formulary issues and dose adjusted per My Chart msg.      5. Attention deficit hyperactivity disorder (ADHD), predominantly inattentive type  Does not tolerate most amphetamines. Did not get relief with Strattera. She wants to work with therapist before seeing Dr. Yasmine Duvall for other treatment options. She does not want to try Intuiv at this time. Return in about 12 weeks (around 6/24/2022) for pap smear and Xanax refill. OBJECTIVE:    Vitals:    04/01/22 1423   BP: 110/70   Pulse: 54   Weight: 232 lb (105.2 kg)   Height: 5' 8\" (1.727 m)       Physical Exam  Vitals reviewed. Constitutional:       General: She is not in acute distress. Appearance: She is well-developed. She is not ill-appearing or diaphoretic. HENT:      Head:      Jaw: No trismus. Right Ear: Tympanic membrane, ear canal and external ear normal.      Left Ear: Tympanic membrane, ear canal and external ear normal.      Nose: Congestion and rhinorrhea present. Right Sinus: No maxillary sinus tenderness or frontal sinus tenderness. Left Sinus: No maxillary sinus tenderness or frontal sinus tenderness. Mouth/Throat:      Pharynx: Uvula midline. No oropharyngeal exudate, posterior oropharyngeal erythema or uvula swelling. Eyes:      General:         Right eye: No discharge. Left eye: No discharge. Conjunctiva/sclera: Conjunctivae normal.      Right eye: Right conjunctiva is not injected. Left eye: Left conjunctiva is not injected. Neck:      Thyroid: No thyromegaly. Trachea: Phonation normal.   Cardiovascular:      Rate and Rhythm: Normal rate and regular rhythm. Heart sounds: Normal heart sounds. Pulmonary:      Effort: Pulmonary effort is normal. No respiratory distress. Breath sounds: Normal breath sounds. No wheezing, rhonchi or rales. Musculoskeletal:      Cervical back: Neck supple. Lymphadenopathy:      Cervical: No cervical adenopathy. Skin:     General: Skin is warm and dry. Nails: There is no clubbing. Additional History:   Outpatient Medications Marked as Taking for the 4/1/22 encounter (Office Visit) with Teri Moncada MD   Medication Sig Dispense Refill    SUMAtriptan (IMITREX) 100 MG tablet TAKE ONE TABLET BY MOUTH AT ONSET OF HEADACHE; MAY REPEAT ONE TABLET IN 2 HOURS IF NEEDED. MAX OF 2 TABLETS PER DAY 9 tablet 2    Erenumab-aooe (AIMOVIG) 140 MG/ML SOAJ Inject 140 mg into the skin every 30 days 1 mL 2    levonorgestrel-ethinyl estradiol (JOLESSA) 0.15-0.03 MG per tablet TAKE ONE TABLET BY MOUTH DAILY 91 tablet 3    butalbital-acetaminophen-caffeine (FIORICET, ESGIC) -40 MG per tablet TAKE ONE TABLET BY MOUTH EVERY 4 HOURS AS NEEDED FOR HEADACHES **LIMIT TO 2 DAYS PER WEEK, DO NOT USE DAILY** 30 tablet 5    Fremanezumab-vfrm (AJOVY) 225 MG/1.5ML SOSY INJECT ONE SYRINGE (1.5 MLS) UNDER THE SKIN EVERY 30 DAYS 1 each 5    metoclopramide (REGLAN) 10 MG tablet 1/2 - 1 tab at beginning of meals and if needed 1 at bedtime. Do not use more than 3 weeks in a row without breaking for a week. 90 tablet 0    amphetamine-dextroamphetamine (ADDERALL XR) 10 MG extended release capsule Take 1 capsule by mouth daily as needed (need for ADD treatment to get work done.) for up to 30 days. 30 capsule 0    Coenzyme Q10 (COQ10) 100 MG CAPS       Riboflavin 400 MG TABS       escitalopram (LEXAPRO) 5 MG tablet Take 1.5 tablets by mouth daily 45 tablet 5    hydrOXYzine (VISTARIL) 25 MG capsule 25 mg at bedtime for 3 nights to break the cycle of headache then PRN 30 capsule 3    fluticasone (FLONASE) 50 MCG/ACT nasal spray 1-2 sprays each nostril daily. Must be used regularly to help. 1 Bottle 12    clindamycin-benzoyl peroxide (BENZACLIN) 1-5 % gel APPLY EXTERNALLY TO THE AFFECTED AREA TWICE DAILY 150 g 3    cetirizine (ZYRTEC) 10 MG tablet Take 1 tablet by mouth daily Works best to take at bedtime.  90 tablet 3 Review of Systems    An electronic signature was used to authenticate this note.     Trinidad Puga MD

## 2022-04-04 ENCOUNTER — TELEMEDICINE (OUTPATIENT)
Dept: PSYCHOLOGY | Age: 32
End: 2022-04-04
Payer: COMMERCIAL

## 2022-04-04 DIAGNOSIS — F41.1 GAD (GENERALIZED ANXIETY DISORDER): Primary | ICD-10-CM

## 2022-04-04 DIAGNOSIS — F41.0 PANIC DISORDER: ICD-10-CM

## 2022-04-04 DIAGNOSIS — F90.0 ATTENTION DEFICIT HYPERACTIVITY DISORDER (ADHD), PREDOMINANTLY INATTENTIVE TYPE: ICD-10-CM

## 2022-04-04 PROCEDURE — 90832 PSYTX W PT 30 MINUTES: CPT | Performed by: PSYCHOLOGIST

## 2022-04-04 NOTE — PROGRESS NOTES
TELEHEALTH VISIT -- Audio/Visual (During WCNZT-18 public health emergency)  }  Gerardo Sharif was evaluated through a synchronous (real-time) audio-video encounter (via Doxy. me). The patient (or guardian, if applicable) is aware that this is a billable service, which includes applicable co-pays. This Virtual Visit was conducted with patient's (and/or legal guardian's) consent. The visit was conducted pursuant to the emergency declaration under the [address] [place] [address], [address] [place] [address] authority and the Explore Engage Act. Patient identification was verified, and a caregiver was present when appropriate. The patient was located in a state where the provider was licensed to provide care. Conducted a risk-benefit analysis and determined that the patient's presenting problems are consistent with the use of telepsychology. Determined that the patient has sufficient knowledge and skills in the use of technology enabling them to adequately benefit from telepsychology. It was determined that this patient was able to be properly treated without an in-person session. Patient verified that they were currently located at the [place], [place], or [place] address that was provided during registration or another address, if noted below.     Verified the following information:  Patient's identification: Yes  Patient location: [address] [place] [address]   Patient's call back number: [phone]   Patient's emergency contact's name and number, as well as permission to contact them if needed: Extended Emergency Contact Information  Primary Emergency Contact: Blair Mckeon  Address: [address] [facility], [address] Phone: [phone]  Work Phone: [phone]  Relation: Parent  Secondary Emergency Contact: Dorita Baumann  Address: [address] [facility], [address] [facility] [address] [facility] Phone: [phone]  Work Phone: [phone]  Relation: Parent     Provider location: [facility] [address] Consultation  Rae Mrash, Ph.D.  Psychologist  4/4/2022  11:30 AM      Time spent with Patient: 28 minutes  This is patient's 36th  [facility] appointment. Reason for Consult:    Chief Complaint   Patient presents with    Anxiety       Feedback given to PCP. S:  Pt seen for f/u of anxiety and depression, ADHD. Pt reported improved mood and sxs overall, anxiety is currently exacerbated, associates partially w drinking over weekend. For 6 wks has been logging food intake and walking daily, feeling proud about it. Wasn't feeling comfortable at what was her highest weight, feeling fatigued and wanted a change. Has lost 8 lbs. Consuming about 1700/1800 franklin/day, shooting for 10,000 steps/day. Shift has changed from 12-10:30, 4 d/wk. Irritated by having to adjust her sleep cycle, got recurrent migraine last week. She is finding herself attracted to [employer] again. Discussed social anxiety in certain situations. For example, stopped going to walks in neighborhood.      O:  MSE:  Appearance    alert, cooperative  Appetite normal  Sleep disturbance Yes  Fatigue Yes  Loss of pleasure No  Impulsive behavior No  Speech    spontaneous, normal rate, normal volume and well articulated  Mood    Anxious  Affect    anxiety  Thought Content    intact and cognitive distortions  Thought Process    goal directed and coherent  Associations    logical connections  Insight    Fair  Judgment    Intact  Orientation    oriented to person, place, time, and general circumstances  Memory    recent and remote memory intact  Attention/Concentration    impaired  Morbid ideation No  Suicide Assessment    no suicidal ideation    History:  Social History:   Social History     Socioeconomic History    Marital status: Single     Spouse name: Not on file    Number of children: 0    Years of education: 15    Highest education level: Not on file   Occupational History    Occupation:  grad     Comment: biology    Occupation: [employer]      Comment: [address]   Tobacco Use    Smoking status: Former Smoker     Packs/day: 0.25     Years: 0.50     Pack years: 0.12     Types: Cigarettes     Start date: 2013     Quit date: 2013     Years since quittin.2    Smokeless tobacco: Never Used    Tobacco comment: Denies smoking at this time   Substance and Sexual Activity    Alcohol use: Yes     Comment: h/o weekend binges 5 or more drinks when out 1-2x/week;  :  cut most to all drinking out    Drug use: Yes     Types: Marijuana Amaury Bilberry)    Sexual activity: Not Currently     Partners: Male     Birth control/protection: Condom, OCP   Other Topics Concern    Not on file   Social History Narrative    Not on file     Social Determinants of Health     Financial Resource Strain: Low Risk     Difficulty of Paying Living Expenses: Not hard at all   Food Insecurity: No Food Insecurity    Worried About Running Out of Food in the Last Year: Never true    Tanvir of Food in the Last Year: Never true   Transportation Needs:     Lack of Transportation (Medical): Not on file    Lack of Transportation (Non-Medical):  Not on file   Physical Activity:     Days of Exercise per Week: Not on file    Minutes of Exercise per Session: Not on file   Stress:     Feeling of Stress : Not on file   Social Connections:     Frequency of Communication with Friends and Family: Not on file    Frequency of Social Gatherings with Friends and Family: Not on file    Attends Yazidism Services: Not on file    Active Member of Clubs or Organizations: Not on file    Attends Club or Organization Meetings: Not on file    Marital Status: Not on file   Intimate Partner Violence:     Fear of Current or Ex-Partner: Not on file    Emotionally Abused: Not on file    Physically Abused: Not on file   Paty Me Sexually Abused: Not on file   Housing Stability:     Unable to Pay for Housing in the Last Year: Not on file    Number of Miya in the Last Year: Not on file    Unstable Housing in the Last Year: Not on file     TOBACCO:   reports that she quit smoking about 8 years ago. Her smoking use included cigarettes. She started smoking about 8 years ago. She has a 0.13 pack-year smoking history. She has never used smokeless tobacco.  ETOH:   reports current alcohol use. Diagnosis:  1. VERA (generalized anxiety disorder)    2. Attention deficit hyperactivity disorder (ADHD), predominantly inattentive type    3. Panic disorder          Plan:  Pt interventions:  Trained in strategies for increasing balanced thinking, Discussed self-care (sleep, nutrition, rewarding activities, social support, exercise), Discussed and problem-solved barriers in adhering to behavioral change plan, Supportive techniques and Identified maladaptive thoughts        Documentation was done using voice recognition dragon software. Every effort was made to ensure accuracy; however, inadvertent, unintentional computerized transcription errors may be present.

## 2022-05-05 ENCOUNTER — TELEMEDICINE (OUTPATIENT)
Dept: PSYCHOLOGY | Age: 32
End: 2022-05-05
Payer: COMMERCIAL

## 2022-05-05 DIAGNOSIS — F90.0 ATTENTION DEFICIT HYPERACTIVITY DISORDER (ADHD), PREDOMINANTLY INATTENTIVE TYPE: ICD-10-CM

## 2022-05-05 DIAGNOSIS — F41.1 GAD (GENERALIZED ANXIETY DISORDER): Primary | ICD-10-CM

## 2022-05-05 DIAGNOSIS — F41.0 PANIC DISORDER: ICD-10-CM

## 2022-05-05 PROCEDURE — 90832 PSYTX W PT 30 MINUTES: CPT | Performed by: PSYCHOLOGIST

## 2022-05-05 NOTE — PROGRESS NOTES
TELEHEALTH VISIT -- Audio/Visual (During QCDBW-42 public health emergency)  }  Jana Oneill was evaluated through a synchronous (real-time) audio-video encounter (via Doxy. me). The patient (or guardian, if applicable) is aware that this is a billable service, which includes applicable co-pays. This Virtual Visit was conducted with patient's (and/or legal guardian's) consent. The visit was conducted pursuant to the emergency declaration under the [address] [place] [address], [address] [place] [address] authority and the mySugr Act. Patient identification was verified, and a caregiver was present when appropriate. The patient was located in a state where the provider was licensed to provide care. Conducted a risk-benefit analysis and determined that the patient's presenting problems are consistent with the use of telepsychology. Determined that the patient has sufficient knowledge and skills in the use of technology enabling them to adequately benefit from telepsychology. It was determined that this patient was able to be properly treated without an in-person session. Patient verified that they were currently located at the [place], [place], or [place] address that was provided during registration or another address, if noted below.     Verified the following information:  Patient's identification: Yes  Patient location: [address] [place] [address]   Patient's call back number: [phone]   Patient's emergency contact's name and number, as well as permission to contact them if needed: Extended Emergency Contact Information  Primary Emergency Contact: Blair Mckeon  Address: [address] [facility], [address] Phone: [phone]  Work Phone: [phone]  Relation: Parent  Secondary Emergency Contact: Dorita aBumann  Address: [address] [facility], [address] [facility] [address] [facility] Phone: [phone]  Work Phone: [phone]  Relation: Parent     Provider location: [facility]  Satish Cole, Ph.D.  Psychologist  5/5/2022  10:10 AM      Time spent with Patient: 23 minutes  This is patient's 37th  [facility] appointment. Reason for Consult:    Chief Complaint   Patient presents with    Anxiety       Feedback given to PCP. S:  Pt seen for f/u of depression and anxiety. Pt reported variable mood and sxs. She is considering leaving her job d/t feeling overlooked for training. Hoping to get on URI and STI team to have more stability, doesn't feel liked by managers. Cites the managers who come off as \"overly nice\" don't like her. Applied for a job at [employer]. As such, she is anxious every day at work d/t instability. 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 Discussed increasing adaptive coping skills before and during work.      O:  MSE:  Appearance    alert, cooperative  Appetite normal  Sleep disturbance Yes  Fatigue Yes  Loss of pleasure No  Impulsive behavior No  Speech    spontaneous, normal rate, normal volume and well articulated  Mood    Anxious  Affect    anxiety  Thought Content    intact and cognitive distortions  Thought Process    goal directed and coherent  Associations    logical connections  Insight    Fair  Judgment    Intact  Orientation    oriented to person, place, time, and general circumstances  Memory    recent and remote memory intact  Attention/Concentration    impaired  Morbid ideation No  Suicide Assessment    no suicidal ideation    History:  Social History:   Social History     Socioeconomic History    Marital status: Single     Spouse name: Not on file    Number of children: 0    Years of education: 12    Highest education level: Not on file   Occupational History    Occupation:  grad     Comment: biology    Occupation: [employer]      Comment: Housing in the Last Year: Not on file     TOBACCO:   reports that she quit smoking about 8 years ago. Her smoking use included cigarettes. She started smoking about 8 years ago. She has a 0.13 pack-year smoking history. She has never used smokeless tobacco.  ETOH:   reports current alcohol use. Diagnosis:  1. VERA (generalized anxiety disorder)    2. Attention deficit hyperactivity disorder (ADHD), predominantly inattentive type    3. Panic disorder          Plan:  Pt interventions:  Trained in strategies for increasing balanced thinking, Supportive techniques and Identified maladaptive thoughts        Documentation was done using voice recognition dragon software. Every effort was made to ensure accuracy; however, inadvertent, unintentional computerized transcription errors may be present.

## 2022-06-02 ENCOUNTER — TELEMEDICINE (OUTPATIENT)
Dept: PSYCHOLOGY | Age: 32
End: 2022-06-02
Payer: COMMERCIAL

## 2022-06-02 DIAGNOSIS — F41.1 GAD (GENERALIZED ANXIETY DISORDER): Primary | ICD-10-CM

## 2022-06-02 DIAGNOSIS — F90.0 ATTENTION DEFICIT HYPERACTIVITY DISORDER (ADHD), PREDOMINANTLY INATTENTIVE TYPE: ICD-10-CM

## 2022-06-02 DIAGNOSIS — F41.0 PANIC DISORDER: ICD-10-CM

## 2022-06-02 PROCEDURE — 90832 PSYTX W PT 30 MINUTES: CPT | Performed by: PSYCHOLOGIST

## 2022-06-02 NOTE — PROGRESS NOTES
TELEHEALTH VISIT -- Audio/Visual (During JPLTG-10 public health emergency)  }  Leann Patel was evaluated through a synchronous (real-time) audio-video encounter (via Doxy. me). The patient (or guardian, if applicable) is aware that this is a billable service, which includes applicable co-pays. This Virtual Visit was conducted with patient's (and/or legal guardian's) consent. The visit was conducted pursuant to the emergency declaration under the [address] [place] [address], [address] [place] [address] authority and the Plaid Act. Patient identification was verified, and a caregiver was present when appropriate. The patient was located in a state where the provider was licensed to provide care. Conducted a risk-benefit analysis and determined that the patient's presenting problems are consistent with the use of telepsychology. Determined that the patient has sufficient knowledge and skills in the use of technology enabling them to adequately benefit from telepsychology. It was determined that this patient was able to be properly treated without an in-person session. Patient verified that they were currently located at the [place], [place], or [place] address that was provided during registration or another address, if noted below.     Verified the following information:  Patient's identification: Yes  Patient location: [address] [place] [address]   Patient's call back number: [phone]   Patient's emergency contact's name and number, as well as permission to contact them if needed: Extended Emergency Contact Information  Primary Emergency Contact: Blair Mckeon  Address: [address] [facility], [address] Phone: [phone]  Work Phone: [phone]  Relation: Parent  Secondary Emergency Contact: Dorita Baumann  Address: [address] [facility], [address] [facility] [address] [facility] Phone: [phone]  Work Phone: [phone]  Relation: Parent     Provider location: [facility]  Jil Mckinnon, Ph.D.  Psychologist  6/2/2022  10:35 AM      Time spent with Patient: 27 minutes  This is patient's 38th  [facility] appointment. Reason for Consult:    Chief Complaint   Patient presents with    Anxiety       Feedback given to PCP. S:  Pt seen for f/u of anxiety. Pt reported worsened mood and sxs. Reported she has been having racing thoughts and \"sick\" feeling. Feels better after doing a meditation on Calm kelsea, but doesn't work all the time. Occurring at random times, starts w a feeling of unease. Gets worse if she doesn't walk for a few days. She applied for 7 positions for new company, hasn't heard anything. Reviewed options: Talking to certain safe people, walking, sometimes using Calm kelsea,     Recommended using ice, humming, progressive muscle relaxation, diaphragmatic breathing, talking to someone, going for a walk or other exercise, putting face in front of a fan.      O:  MSE:  Appearance    alert, cooperative  Appetite normal  Sleep disturbance Yes  Fatigue Yes  Loss of pleasure No  Impulsive behavior No  Speech    spontaneous, normal rate, normal volume and well articulated  Mood    Anxious  Affect    anxiety  Thought Content    intact and cognitive distortions  Thought Process    goal directed and coherent  Associations    logical connections  Insight    Fair  Judgment    Intact  Orientation    oriented to person, place, time, and general circumstances  Memory    recent and remote memory intact  Attention/Concentration    impaired  Morbid ideation No  Suicide Assessment    no suicidal ideation    History:  Social History:   Social History     Socioeconomic History    Marital status: Single     Spouse name: Not on file    Number of children: 0    Years of education: 12    Highest education level: Not on file   Occupational History    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    Occupation: [employer]      Comment: [address]   Tobacco Use    Smoking status: Former Smoker     Packs/day: 0.25     Years: 0.50     Pack years: 0.12     Types: Cigarettes     Start date: 2013     Quit date: 2013     Years since quittin.4    Smokeless tobacco: Never Used    Tobacco comment: Denies smoking at this time   Substance and Sexual Activity    Alcohol use: Yes     Comment: h/o weekend binges 5 or more drinks when out 1-2x/week;  :  cut most to all drinking out    Drug use: Yes     Types: Marijuana Destiny Pereira)    Sexual activity: Not Currently     Partners: Male     Birth control/protection: Condom, OCP   Other Topics Concern    Not on file   Social History Narrative    Not on file     Social Determinants of Health     Financial Resource Strain: Low Risk     Difficulty of Paying Living Expenses: Not hard at all   Food Insecurity: No Food Insecurity    Worried About Running Out of Food in the Last Year: Never true    [address] in the Last Year: Never true   Transportation Needs:     Lack of Transportation (Medical): Not on file    Lack of Transportation (Non-Medical):  Not on file   Physical Activity:     Days of Exercise per Week: Not on file    Minutes of Exercise per Session: Not on file   Stress:     Feeling of Stress : Not on file   Social Connections:     Frequency of Communication with Friends and Family: Not on file    Frequency of Social Gatherings with Friends and Family: Not on file    Attends Methodist Services: Not on file    Active Member of Clubs or Organizations: Not on file    Attends Club or Organization Meetings: Not on file    Marital Status: Not on file   Intimate Partner Violence:     Fear of Current or Ex-Partner: Not on file    Emotionally Abused: Not on file    Physically Abused: Not on file    Sexually Abused: Not on file   Housing Stability:     Unable to Pay for Housing in the Last Year: Not on file    Number of Places Lived in the Last Year: Not on file    Unstable Housing in the Last Year: Not on file     TOBACCO:   reports that she quit smoking about 8 years ago. Her smoking use included cigarettes. She started smoking about 8 years ago. She has a 0.13 pack-year smoking history. She has never used smokeless tobacco.  ETOH:   reports current alcohol use. Diagnosis:  1. VERA (generalized anxiety disorder)    2. Panic disorder    3. Attention deficit hyperactivity disorder (ADHD), predominantly inattentive type          Plan:  Pt interventions:  Trained in strategies for increasing balanced thinking and Provided Psychoeducation re: opinions to reducing distress        Documentation was done using voice recognition dragon software. Every effort was made to ensure accuracy; however, inadvertent, unintentional computerized transcription errors may be present.

## 2022-06-06 ENCOUNTER — PATIENT MESSAGE (OUTPATIENT)
Dept: INTERNAL MEDICINE CLINIC | Age: 32
End: 2022-06-06

## 2022-06-06 RX ORDER — ERENUMAB-AOOE 140 MG/ML
140 INJECTION, SOLUTION SUBCUTANEOUS
Qty: 3 ML | Refills: 2 | Status: SHIPPED | OUTPATIENT
Start: 2022-06-06

## 2022-06-06 NOTE — TELEPHONE ENCOUNTER
From: Jaime Fernandez  To: Dr. Simi Harris: 6/6/2022 1:26 PM EDT  Subject: Alex Jonas    I did get approval for a 90 day supply of aimovig! If you could send that over to the pharmacy that would be amazing. I might fill the single prescription once more and then use the 90 day.  Thank you!!

## 2022-06-12 DIAGNOSIS — G43.109 MIGRAINE WITH AURA AND WITHOUT STATUS MIGRAINOSUS, NOT INTRACTABLE: ICD-10-CM

## 2022-06-13 ENCOUNTER — TELEPHONE (OUTPATIENT)
Dept: ADMINISTRATIVE | Age: 32
End: 2022-06-13

## 2022-06-13 RX ORDER — SUMATRIPTAN 100 MG/1
TABLET, FILM COATED ORAL
Qty: 9 TABLET | Refills: 2 | Status: SHIPPED | OUTPATIENT
Start: 2022-06-13 | End: 2022-08-17

## 2022-06-13 NOTE — TELEPHONE ENCOUNTER
Submitted PA for Aimovig 140MG/ML auto-injectors. Key: [id number]. Via CMM STATUS: APPROVED. Will scan letter when received. If this requires a response please respond to the pool ( P [facility] [address]). Thank you please advise patient.

## 2022-06-26 ENCOUNTER — PATIENT MESSAGE (OUTPATIENT)
Dept: INTERNAL MEDICINE CLINIC | Age: 32
End: 2022-06-26

## 2022-06-26 DIAGNOSIS — G43.109 MIGRAINE WITH AURA AND WITHOUT STATUS MIGRAINOSUS, NOT INTRACTABLE: Primary | ICD-10-CM

## 2022-06-27 ENCOUNTER — TELEMEDICINE (OUTPATIENT)
Dept: PSYCHOLOGY | Age: 32
End: 2022-06-27
Payer: COMMERCIAL

## 2022-06-27 DIAGNOSIS — F90.0 ATTENTION DEFICIT HYPERACTIVITY DISORDER (ADHD), PREDOMINANTLY INATTENTIVE TYPE: ICD-10-CM

## 2022-06-27 DIAGNOSIS — F41.1 GAD (GENERALIZED ANXIETY DISORDER): Primary | ICD-10-CM

## 2022-06-27 DIAGNOSIS — F41.0 PANIC DISORDER: ICD-10-CM

## 2022-06-27 PROCEDURE — 90832 PSYTX W PT 30 MINUTES: CPT | Performed by: PSYCHOLOGIST

## 2022-06-27 RX ORDER — HYDROXYZINE PAMOATE 25 MG/1
CAPSULE ORAL
Qty: 30 CAPSULE | Refills: 3 | Status: SHIPPED | OUTPATIENT
Start: 2022-06-27

## 2022-06-27 NOTE — TELEPHONE ENCOUNTER
From: Charly Bobby  To: Dr. Mcghee Person: 6/26/2022 10:23 AM EDT  Subject: Summer migraines    Hi Dr. Savannah Spivey,  As expected, with warmer weather my migraines are getting worse. They were tolerable last summer using ajovy and the Adi Rhoades has been working really well so far so Im not sure, maybe Ill ask insurance about ajovy again? ? Would it be possible to switch between the two or start and stop the injections? It seems they are both very effective for 4-6 months then are a little less effective unless I stop and start like I did when I switched from ajovy to Adi Rhoades. Anyway the main reason I reached out it because I want to see if I can try nurtec alongside imitrex (not taken at the same time). Chele Yusuf been hearing really good things and I just want to give it a try, but not if that means stopping imitrex because I know that works and it can save me when Im at work and get an attack. Lastly can you send me a referral to [facility] neurology I think it was? I want to finally talk to them about getting Botox. I just want to not be in pain, its exhausting.

## 2022-06-27 NOTE — PROGRESS NOTES
TELEHEALTH VISIT -- Audio/Visual (During RXMUG-77 public health emergency)  }  Charly Bobby was evaluated through a synchronous (real-time) audio-video encounter (via Doxy. me). The patient (or guardian, if applicable) is aware that this is a billable service, which includes applicable co-pays. This Virtual Visit was conducted with patient's (and/or legal guardian's) consent. The visit was conducted pursuant to the emergency declaration under the [address] [place] [address], [address] [place] [address] authority and the [employer] Act. Patient identification was verified, and a caregiver was present when appropriate. The patient was located in a state where the provider was licensed to provide care. Conducted a risk-benefit analysis and determined that the patient's presenting problems are consistent with the use of telepsychology. Determined that the patient has sufficient knowledge and skills in the use of technology enabling them to adequately benefit from telepsychology. It was determined that this patient was able to be properly treated without an in-person session. Patient verified that they were currently located at the [place], [place], or [place] address that was provided during registration or another address, if noted below.     Verified the following information:  Patient's identification: Yes  Patient location: [address] [place] [address]   Patient's call back number: [phone]   Patient's emergency contact's name and number, as well as permission to contact them if needed: Extended Emergency Contact Information  Primary Emergency Contact: Blair Mckeon  Address: [address], [address] [place] [address] Phone: [phone]  Work Phone: [phone]  Relation: Parent  Secondary Emergency Contact: Dorita Baumann  Address: [address] [facility]           [address] [facility], [address] [facility] [address] [facility] Phone: [phone]  Work Phone: [phone]  Relation: Parent     Provider location: [facility]  Travon Euceda, Ph.D.  Psychologist  2022  9:12 AM      Time spent with Patient: 25 minutes  This is patient's  39th   [facility] appointment. Reason for Consult:    Chief Complaint   Patient presents with    Anxiety       Feedback given to PCP. S:  Pt seen for f/u of depression and. Pt reported variable mood and sxs. Ghulam's mom sent her a text saying his cat  and he wanted her to know he is devastated. Then she started getting 10-15 unknown number phone calls a day and would hang up immediately if she had a male coworker answer. Then learned he was on 72 hr hold for SI; the phone calls stopped the day he would have been discharged. Went to new friend's home and it was the same house pt lived w [place], on a different floor. She felt fine while there, but couldn't stop crying when she got home. Processed these two triggering events and grounding techniques    Topics for future appts: anxiety, self-confidence issues.      O:  MSE:  Appearance    alert, cooperative  Appetite normal  Sleep disturbance Yes  Fatigue Yes  Loss of pleasure No  Impulsive behavior No  Speech    spontaneous, normal rate, normal volume and well articulated  Mood    Anxious  Affect    anxiety  Thought Content    intact and cognitive distortions  Thought Process    goal directed and coherent  Associations    logical connections  Insight    Fair  Judgment    Intact  Orientation    oriented to person, place, time, and general circumstances  Memory    recent and remote memory intact  Attention/Concentration    impaired  Morbid ideation No  Suicide Assessment    no suicidal ideation    History:  Social History:   Social History     Socioeconomic History    Marital status: Single     Spouse name: Not on file    Number of children: 0    Years of education: 12    Highest education level: Not on file Occupational History    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    Occupation: [employer]      Comment: [address]   Tobacco Use    Smoking status: Former Smoker     Packs/day: 0.25     Years: 0.50     Pack years: 0.12     Types: Cigarettes     Start date: 2013     Quit date: 2013     Years since quittin.5    Smokeless tobacco: Never Used    Tobacco comment: Denies smoking at this time   Substance and Sexual Activity    Alcohol use: Yes     Comment: h/o weekend binges 5 or more drinks when out 1-2x/week;  :  cut most to all drinking out    Drug use: Yes     Types: Marijuana Marti Gouty)    Sexual activity: Not Currently     Partners: Male     Birth control/protection: Condom, OCP   Other Topics Concern    Not on file   Social History Narrative    Not on file     Social Determinants of Health     Financial Resource Strain: Low Risk     Difficulty of Paying Living Expenses: Not hard at all   Food Insecurity: No Food Insecurity    Worried About [address] in the Last Year: Never true    [address] in the Last Year: Never true   Transportation Needs:     Lack of Transportation (Medical): Not on file    Lack of Transportation (Non-Medical):  Not on file   Physical Activity:     Days of Exercise per Week: Not on file    Minutes of Exercise per Session: Not on file   Stress:     Feeling of Stress : Not on file   Social Connections:     Frequency of Communication with Friends and Family: Not on file    Frequency of Social Gatherings with Friends and Family: Not on file    Attends Gnosticism Services: Not on file    Active Member of Clubs or Organizations: Not on file    Attends Club or Organization Meetings: Not on file    Marital Status: Not on file   Intimate Partner Violence:     Fear of Current or Ex-Partner: Not on file    Emotionally Abused: Not on file    Physically Abused: Not on file    Sexually Abused: Not on file   Housing Stability:     Unable to Pay for Housing in the Last Year: Not on file    Number of Places Lived in the Last Year: Not on file    Unstable Housing in the Last Year: Not on file     TOBACCO:   reports that she quit smoking about 8 years ago. Her smoking use included cigarettes. She started smoking about 8 years ago. She has a 0.13 pack-year smoking history. She has never used smokeless tobacco.  ETOH:   reports current alcohol use. Diagnosis:  1. VERA (generalized anxiety disorder)    2. Attention deficit hyperactivity disorder (ADHD), predominantly inattentive type    3. Panic disorder          Plan:  Pt interventions:  Trained in strategies for increasing balanced thinking, Trained in relaxation strategies, and Discussed self-care (sleep, nutrition, rewarding activities, social support, exercise)        Documentation was done using voice recognition dragon software. Every effort was made to ensure accuracy; however, inadvertent, unintentional computerized transcription errors may be present.

## 2022-06-29 ENCOUNTER — TELEMEDICINE (OUTPATIENT)
Dept: PSYCHOLOGY | Age: 32
End: 2022-06-29
Payer: COMMERCIAL

## 2022-06-29 DIAGNOSIS — F90.0 ATTENTION DEFICIT HYPERACTIVITY DISORDER (ADHD), PREDOMINANTLY INATTENTIVE TYPE: ICD-10-CM

## 2022-06-29 DIAGNOSIS — F41.0 PANIC DISORDER: ICD-10-CM

## 2022-06-29 DIAGNOSIS — F41.1 GAD (GENERALIZED ANXIETY DISORDER): Primary | ICD-10-CM

## 2022-06-29 PROCEDURE — 90832 PSYTX W PT 30 MINUTES: CPT | Performed by: PSYCHOLOGIST

## 2022-06-29 NOTE — PROGRESS NOTES
TELEHEALTH VISIT -- Audio/Visual (During FEEOF-20 public health emergency)  }  Mazin Colon was evaluated through a synchronous (real-time) audio-video encounter (via Doxy. me). The patient (or guardian, if applicable) is aware that this is a billable service, which includes applicable co-pays. This Virtual Visit was conducted with patient's (and/or legal guardian's) consent. The visit was conducted pursuant to the emergency declaration under the [address] [place] [address], [address] [place] [address] authority and the [employer] Act. Patient identification was verified, and a caregiver was present when appropriate. The patient was located in a state where the provider was licensed to provide care. Conducted a risk-benefit analysis and determined that the patient's presenting problems are consistent with the use of telepsychology. Determined that the patient has sufficient knowledge and skills in the use of technology enabling them to adequately benefit from telepsychology. It was determined that this patient was able to be properly treated without an in-person session. Patient verified that they were currently located at the [place], [place], or [place] address that was provided during registration or another address, if noted below.     Verified the following information:  Patient's identification: Yes  Patient location: [address] [place] [address]   Patient's call back number: [phone]   Patient's emergency contact's name and number, as well as permission to contact them if needed: Extended Emergency Contact Information  Primary Emergency Contact: Blair Mckeon  Address: [address] [facility], [address] Phone: [phone]  Work Phone: [phone]  Relation: Parent  Secondary Emergency Contact: Dorita Baumann  Address: [address] [facility], [address] [facility] Tobacco Use    Smoking status: Former Smoker     Packs/day: 0.25     Years: 0.50     Pack years: 0.12     Types: Cigarettes     Start date: 2013     Quit date: 2013     Years since quittin.5    Smokeless tobacco: Never Used    Tobacco comment: Denies smoking at this time   Substance and Sexual Activity    Alcohol use: Yes     Comment: h/o weekend binges 5 or more drinks when out 1-2x/week;  :  cut most to all drinking out    Drug use: Yes     Types: Marijuana Gerson Perico)    Sexual activity: Not Currently     Partners: Male     Birth control/protection: Condom, OCP   Other Topics Concern    Not on file   Social History Narrative    Not on file     Social Determinants of Health     Financial Resource Strain: Low Risk     Difficulty of Paying Living Expenses: Not hard at all   Food Insecurity: No Food Insecurity    Worried About Running Out of Food in the Last Year: Never true    [address] in the Last Year: Never true   Transportation Needs:     Lack of Transportation (Medical): Not on file    Lack of Transportation (Non-Medical):  Not on file   Physical Activity:     Days of Exercise per Week: Not on file    Minutes of Exercise per Session: Not on file   Stress:     Feeling of Stress : Not on file   Social Connections:     Frequency of Communication with Friends and Family: Not on file    Frequency of Social Gatherings with Friends and Family: Not on file    Attends Congregational Services: Not on file    Active Member of Clubs or Organizations: Not on file    Attends Club or Organization Meetings: Not on file    Marital Status: Not on file   Intimate Partner Violence:     Fear of Current or Ex-Partner: Not on file    Emotionally Abused: Not on file    Physically Abused: Not on file    Sexually Abused: Not on file   Housing Stability:     Unable to Pay for Housing in the Last Year: Not on file    Number of [place] in the Last Year: Not on file    Unstable Housing in the Last Year: Not on file TOBACCO:   reports that she quit smoking about 8 years ago. Her smoking use included cigarettes. She started smoking about 8 years ago. She has a 0.13 pack-year smoking history. She has never used smokeless tobacco.  ETOH:   reports current alcohol use. Diagnosis:  1. VERA (generalized anxiety disorder)    2. Attention deficit hyperactivity disorder (ADHD), predominantly inattentive type    3. Panic disorder          Plan:  Pt interventions:  Trained in strategies for increasing balanced thinking and Discussed self-care (sleep, nutrition, rewarding activities, social support, exercise)        Documentation was done using voice recognition dragon software. Every effort was made to ensure accuracy; however, inadvertent, unintentional computerized transcription errors may be present.

## 2022-07-03 RX ORDER — RIMEGEPANT SULFATE 75 MG/75MG
TABLET, ORALLY DISINTEGRATING ORAL
Qty: 8 TABLET | Refills: 2 | Status: SHIPPED | OUTPATIENT
Start: 2022-07-03

## 2022-07-05 ENCOUNTER — TELEPHONE (OUTPATIENT)
Dept: ADMINISTRATIVE | Age: 32
End: 2022-07-05

## 2022-07-05 NOTE — TELEPHONE ENCOUNTER
PA COVER MY MEDS  Medication:Nurtec 75MG dispersible tablets  Key:[id number]  Status:PENDING    PA Case: [id number], Status: Approved, Coverage Starts on: 7/5/2022 12:00:00 AM, Coverage Ends on: 7/5/2023 12:00:00 AM.

## 2022-07-08 ENCOUNTER — TELEMEDICINE (OUTPATIENT)
Dept: INTERNAL MEDICINE CLINIC | Age: 32
End: 2022-07-08
Payer: COMMERCIAL

## 2022-07-08 DIAGNOSIS — E66.9 OBESITY, CLASS II, BMI 35-39.9: ICD-10-CM

## 2022-07-08 DIAGNOSIS — G47.33 OBSTRUCTIVE SLEEP APNEA (ADULT) (PEDIATRIC): ICD-10-CM

## 2022-07-08 DIAGNOSIS — U07.1 ACUTE COVID-19: Primary | ICD-10-CM

## 2022-07-08 PROCEDURE — 99213 OFFICE O/P EST LOW 20 MIN: CPT | Performed by: FAMILY MEDICINE

## 2022-07-08 RX ORDER — OXYMETAZOLINE HYDROCHLORIDE 0.05 G/100ML
SPRAY NASAL
COMMUNITY
Start: 2022-07-08

## 2022-07-08 RX ORDER — BENZONATATE 100 MG/1
100 CAPSULE ORAL 3 TIMES DAILY PRN
Qty: 30 CAPSULE | Refills: 1 | Status: SHIPPED | OUTPATIENT
Start: 2022-07-08 | End: 2022-07-22

## 2022-07-08 NOTE — PATIENT INSTRUCTIONS
RECOMMENDATIONS FOR COVID SYMPTOM RELIEF. We suggest treating Covid19 similarly to influenza and other upper respiratory viruses. Antibiotics do not kill or treat viruses. The antiviral Paxlovid can reduce risk and speed recovery a little bit. We encourage you to purchase a a pulse oximeter if you do not already have one. (over the counter at pharmacies or [facility] for about $15-$30):  monitor 3 or more times per day. If oxygen sat is 91% or lower, go to the emergency room. Even if oxygen levels are OK, If you cannot catch your breath, go to the emergency room. For cough, consider Mucinex DM or Delsym. We can consider a prescription cough gelcap if these do not help. If you wheeze, then let office know and I can prescribe an asthma inhaler. If nasal congestion, AFRIN NO DRIP NASAL SPRAY (OXYMETAZOLONE DECONGESTANT SPRAY)  can be used twice daily but only for a MAXIMUM OF 7 DAYS. Longer use can cause rebound congestion. If you have been off for a full 72 hours (3 days) and get stuffy again, you can re-use for 3 more days. You can consider Zyrtec D or Sudafed decongestant instead or in addition if really stuffy or you have active allergies. If fever, aches and pains:  Tylenol 1000 mg every 6 hours as needed. If Ibuprofen is allowed based on your underlying health conditions (not wise for diabetics, chronic kidney disease, ulcer patients, elderly), then you can also take 400 mg every 6 hours as needed with or without the Tylenol. If sore throat:  gargle with salt water, drink plenty of water and avoid acid-y drinks like citrus and coffee, use a sore throat lozenge like Cepacol or a honey and lemon cough drop. Take Tylenol or Ibuprofen if helps with the pain. Cold liquids and foods are better tolerated when sore. There is a tea called Throat Coat tea that could be helpful.     If Diarrhea:  Imodium as needed and try to eat a low fat, low fiber diet    If Stomach or Upper Abdominal distress, you can take Pepcid 20 mg twice a day,  If nausea unrelieved with Pepcid, contact office and I can send in prescription nausea medication. There is no treatment if you have a change in smell or taste and these symptoms can linger for a long time, even when you are well and no longer under quarantine. Try to keep your legs moving even if you are lying in bed to prevent blood clots.     You can look up COVID isolation suggestions at the [facility] web site:  www.cdc.gov

## 2022-07-08 NOTE — PROGRESS NOTES
Jeanette Lorenzo (:  1990) is a Established patient, here for evaluation of the following:  Chief Complaint   Patient presents with    Other     +Covid on          Assessment & Plan   Below is the assessment and plan developed based on review of pertinent history, physical exam, labs, studies, and medications. 1. Acute COVID-19  Due to TETE and obesity and no 3rd dose Covid vaccine, will put on antiviral.  Symptomatic treatment and see AVS.    - nirmatrelvir/ritonavir (PAXLOVID) 20 x 150 MG & 10 x 100MG; Take 3 tablets (two 150 mg nirmatrelvir and one 100 mg ritonavir tablets) by mouth every 12 hours for 5 days. Dispense: 30 tablet; Refill: 0  - oxymetazoline (AFRIN NODRIP EXTRA MOISTURE) 0.05 % nasal spray; Use 2 sprays each nostril twice a day for up to 7 days  - benzonatate (TESSALON PERLES) 100 MG capsule; Take 1 capsule by mouth 3 times daily as needed for Cough  Dispense: 30 capsule; Refill: 1    2. Obstructive sleep apnea (adult) (pediatric)  - nirmatrelvir/ritonavir (PAXLOVID) 20 x 150 MG & 10 x 100MG; Take 3 tablets (two 150 mg nirmatrelvir and one 100 mg ritonavir tablets) by mouth every 12 hours for 5 days. Dispense: 30 tablet; Refill: 0    3. Obesity, Class II, BMI 35-39.9  - nirmatrelvir/ritonavir (PAXLOVID) 20 x 150 MG & 10 x 100MG; Take 3 tablets (two 150 mg nirmatrelvir and one 100 mg ritonavir tablets) by mouth every 12 hours for 5 days. Dispense: 30 tablet; Refill: 0        Call or return to clinic prn if these symptoms worsen or fail to improve as anticipated. Subjective   HPI   She started with symptoms yesterday 22 but headache a really bad migraine or headache. She took home test and it was +. Went to work and got PCR and it was also +. Started getting chills and hot flashes yesterday. Congested and coughing. Took IBU and was going to use a heating pad but feels too hot right now. Her mother will give her money to order stuff.         Current Outpatient Medications on File Prior to Visit   Medication Sig Dispense Refill    Rimegepant Sulfate (NURTEC) 75 MG TBDP Dissolve one under tongue at earliest onset of migraine. Maximum of 1 per 24 hours. 8 tablet 2    hydrOXYzine pamoate (VISTARIL) 25 MG capsule 25 mg at bedtime for 3 nights to break the cycle of headache then PRN 30 capsule 3    SUMAtriptan (IMITREX) 100 MG tablet TAKE ONE TABLET BY MOUTH AT ONSET OF HEADACHE; MAY REPEAT ONE TABLET IN 2 HOURS IF NEEDED. MAX 2 TABLETS PER DAY 9 tablet 2    Erenumab-aooe (AIMOVIG) 140 MG/ML SOAJ Inject 140 mg into the skin every 30 days 3 mL 2    levonorgestrel-ethinyl estradiol (JOLESSA) 0.15-0.03 MG per tablet TAKE ONE TABLET BY MOUTH DAILY 91 tablet 3    butalbital-acetaminophen-caffeine (FIORICET, ESGIC) -40 MG per tablet TAKE ONE TABLET BY MOUTH EVERY 4 HOURS AS NEEDED FOR HEADACHES **LIMIT TO 2 DAYS PER WEEK, DO NOT USE DAILY** 30 tablet 5    Fremanezumab-vfrm (AJOVY) 225 MG/1.5ML SOSY INJECT ONE SYRINGE (1.5 MLS) UNDER THE SKIN EVERY 30 DAYS 1 each 5    metoclopramide (REGLAN) 10 MG tablet 1/2 - 1 tab at beginning of meals and if needed 1 at bedtime. Do not use more than 3 weeks in a row without breaking for a week. 90 tablet 0    amphetamine-dextroamphetamine (ADDERALL XR) 10 MG extended release capsule Take 1 capsule by mouth daily as needed (need for ADD treatment to get work done.) for up to 30 days.  30 capsule 0    Coenzyme Q10 (COQ10) 100 MG CAPS       Riboflavin 400 MG TABS       escitalopram (LEXAPRO) 5 MG tablet Take 1.5 tablets by mouth daily 45 tablet 5    propranolol (INDERAL) 10 MG tablet TAKE 1-2 TABLETS BY MOUTH THREE TIMES DAILY AS NEEDED(ANXIETY, PANIC ATTACK) (Patient not taking: Reported on 1/28/2022) 30 tablet 5    ondansetron (ZOFRAN ODT) 4 MG disintegrating tablet Take 1 tablet by mouth every 4-6 hours as needed for Nausea (Patient not taking: Reported on 4/1/2022) 12 tablet 0    fluticasone (FLONASE) 50 MCG/ACT nasal spray 1-2 sprays each nostril daily. Must be used regularly to help. 1 Bottle 12    omeprazole (PRILOSEC) 20 MG delayed release capsule Take 1 capsule by mouth daily as needed (for flare up. take until well and then stop for a while) (Patient not taking: Reported on 4/1/2022) 30 capsule 3    naproxen sodium (ANAPROX) 550 MG tablet Take one at onset of migraine or musculoskeletal pain and repeat in 6 hours or more but maximum of 2 doses per day. (Patient not taking: Reported on 4/1/2022) 60 tablet 2    clindamycin-benzoyl peroxide (BENZACLIN) 1-5 % gel APPLY EXTERNALLY TO THE AFFECTED AREA TWICE DAILY 150 g 3    cetirizine (ZYRTEC) 10 MG tablet Take 1 tablet by mouth daily Works best to take at bedtime. 90 tablet 3     No current facility-administered medications on file prior to visit. Review of Systems       Objective   Patient-Reported Vitals  Patient-Reported Pulse: 101  Patient-Reported Temperature: 102  Patient-Reported Weight: 217 lb       Physical Exam  Constitutional:       Appearance: Normal appearance. She is ill-appearing. HENT:      Nose: Congestion (based on stuffy speech) present. Eyes:      General: No scleral icterus. Pulmonary:      Effort: Pulmonary effort is normal. No respiratory distress. Skin:     Coloration: Skin is not pale. Neurological:      General: No focal deficit present. Mental Status: She is alert and oriented to person, place, and time. Psychiatric:         Mood and Affect: Mood normal.         Behavior: Behavior normal.              On this date 7/8/2022 I have spent 15 minutes reviewing previous notes, test results and face to face (virtual) with the patient discussing the diagnosis and importance of compliance with the treatment plan as well as documenting on the day of the visit. Tamiko Farfan, was evaluated through a synchronous (real-time) audio-video encounter.  The patient (or guardian if applicable) is aware that this is a billable service, which includes applicable co-pays. This Virtual Visit was conducted with patient's (and/or legal guardian's) consent. The visit was conducted pursuant to the emergency declaration under the [facility] [address] authority and the [employer] Act. Patient identification was verified, and a caregiver was present when appropriate. The patient was located at Home: [address] [place] [address] [address] Counts include 234 beds at the [facility] Provider was located at [facility] (Appt Dept): [address] Leopoldo Memos,  [address] [facility].         --Masha Vieyra MD

## 2022-07-12 ENCOUNTER — PATIENT MESSAGE (OUTPATIENT)
Dept: INTERNAL MEDICINE CLINIC | Age: 32
End: 2022-07-12

## 2022-07-12 DIAGNOSIS — F41.0 PANIC DISORDER: ICD-10-CM

## 2022-07-13 RX ORDER — ALPRAZOLAM 0.5 MG/1
TABLET ORAL
Qty: 20 TABLET | Refills: 0 | Status: SHIPPED | OUTPATIENT
Start: 2022-07-13 | End: 2022-08-09 | Stop reason: SDUPTHER

## 2022-07-20 ENCOUNTER — TELEMEDICINE (OUTPATIENT)
Dept: PSYCHOLOGY | Age: 32
End: 2022-07-20
Payer: COMMERCIAL

## 2022-07-20 DIAGNOSIS — F41.0 PANIC DISORDER: ICD-10-CM

## 2022-07-20 DIAGNOSIS — F41.1 GAD (GENERALIZED ANXIETY DISORDER): Primary | ICD-10-CM

## 2022-07-20 DIAGNOSIS — F90.0 ATTENTION DEFICIT HYPERACTIVITY DISORDER (ADHD), PREDOMINANTLY INATTENTIVE TYPE: ICD-10-CM

## 2022-07-20 PROCEDURE — 90832 PSYTX W PT 30 MINUTES: CPT | Performed by: PSYCHOLOGIST

## 2022-07-20 NOTE — PROGRESS NOTES
Tobacco Use    Smoking status: Former     Packs/day: 0.25     Years: 0.50     Pack years: 0.13     Types: Cigarettes     Start date: 2013     Quit date: 2013     Years since quittin.5    Smokeless tobacco: Never    Tobacco comments:     Denies smoking at this time   Substance and Sexual Activity    Alcohol use: Yes     Comment: h/o weekend binges 5 or more drinks when out 1-2x/week;  :  cut most to all drinking out    Drug use: Yes     Types: Marijuana Ross Marta)    Sexual activity: Not Currently     Partners: Male     Birth control/protection: Condom, OCP   Other Topics Concern    Not on file   Social History Narrative    Not on file     Social Determinants of Health     Financial Resource Strain: Low Risk     Difficulty of Paying Living Expenses: Not hard at all   Food Insecurity: No Food Insecurity    Worried About Running Out of Food in the Last Year: Never true    Ran Out of Food in the Last Year: Never true   Transportation Needs: Not on file   Physical Activity: Not on file   Stress: Not on file   Social Connections: Not on file   Intimate Partner Violence: Not on file   Housing Stability: Not on file     TOBACCO:   reports that she quit smoking about 8 years ago. Her smoking use included cigarettes. She started smoking about 8 years ago. She has a 0.13 pack-year smoking history. She has never used smokeless tobacco.  ETOH:   reports current alcohol use. Diagnosis:  1. VERA (generalized anxiety disorder)    2. Panic disorder    3. Attention deficit hyperactivity disorder (ADHD), predominantly inattentive type          Plan:  Pt interventions:  Trained in strategies for increasing balanced thinking, Provided Psychoeducation re: recovering mentally from illness, and Identified maladaptive thoughts        Documentation was done using voice recognition dragon software.   Every effort was made to ensure accuracy; however, inadvertent, unintentional computerized transcription errors may be present.

## 2022-07-26 RX ORDER — ONDANSETRON 4 MG/1
4 TABLET, ORALLY DISINTEGRATING ORAL
Qty: 12 TABLET | Refills: 0 | Status: SHIPPED | OUTPATIENT
Start: 2022-07-26

## 2022-08-09 ENCOUNTER — OFFICE VISIT (OUTPATIENT)
Dept: INTERNAL MEDICINE CLINIC | Age: 32
End: 2022-08-09
Payer: COMMERCIAL

## 2022-08-09 VITALS
BODY MASS INDEX: 33.75 KG/M2 | DIASTOLIC BLOOD PRESSURE: 80 MMHG | WEIGHT: 222 LBS | HEART RATE: 72 BPM | OXYGEN SATURATION: 97 % | SYSTOLIC BLOOD PRESSURE: 108 MMHG

## 2022-08-09 DIAGNOSIS — Z11.59 ENCOUNTER FOR HCV SCREENING TEST FOR LOW RISK PATIENT: ICD-10-CM

## 2022-08-09 DIAGNOSIS — Z11.4 SCREENING FOR HIV (HUMAN IMMUNODEFICIENCY VIRUS): ICD-10-CM

## 2022-08-09 DIAGNOSIS — F41.1 GAD (GENERALIZED ANXIETY DISORDER): Primary | ICD-10-CM

## 2022-08-09 DIAGNOSIS — Z11.3 SCREEN FOR SEXUALLY TRANSMITTED DISEASES: ICD-10-CM

## 2022-08-09 DIAGNOSIS — F41.0 PANIC DISORDER: ICD-10-CM

## 2022-08-09 PROCEDURE — 99214 OFFICE O/P EST MOD 30 MIN: CPT | Performed by: FAMILY MEDICINE

## 2022-08-09 RX ORDER — ALPRAZOLAM 0.5 MG/1
TABLET ORAL
Qty: 20 TABLET | Refills: 2 | Status: SHIPPED | OUTPATIENT
Start: 2022-08-09 | End: 2022-09-08

## 2022-08-09 RX ORDER — BUSPIRONE HYDROCHLORIDE 5 MG/1
5 TABLET ORAL 2 TIMES DAILY
Qty: 60 TABLET | Refills: 2 | Status: SHIPPED | OUTPATIENT
Start: 2022-08-09

## 2022-08-09 RX ORDER — TRETINOIN 0.5 MG/G
CREAM TOPICAL
Qty: 20 G | Refills: 5 | Status: SHIPPED | OUTPATIENT
Start: 2022-08-09 | End: 2022-09-08

## 2022-08-09 NOTE — PROGRESS NOTES
2022    Jae Sosa (:  1990) is a 36 y.o. female, here for evaluation of the following chief complaint(s):  Medication Check        ASSESSMENT/PLAN:    1. VERA (generalized anxiety disorder)  Discussed that need to see if can find something else to help her panic and anxiety. Having more than 10 panic attacks per month   - busPIRone (BUSPAR) 5 MG tablet; Take 1 tablet by mouth in the morning and 1 tablet before bedtime. Dispense: 60 tablet; Refill: 2    2. Panic disorder  She declines to use SSRI or SNRI except in the winter.   - busPIRone (BUSPAR) 5 MG tablet; Take 1 tablet by mouth in the morning and 1 tablet before bedtime. Dispense: 60 tablet; Refill: 2  - ALPRAZolam (XANAX) 0.5 MG tablet; TAKE 1 TABLET BY MOUTH AT ONSET OF PANIC ATTACK AND REPEAT SECOND DOSE IN ONE HOUR IF NOT RESOLVED. Maximum of 2 per day;  20 must last min of 30 days  Dispense: 20 tablet; Refill: 2    3. Screen for sexually transmitted diseases  Counseled on safer sex but no such \"safe\" sex   - C.trachomatis N.gonorrhoeae DNA, Urine    4. Screening for HIV (human immunodeficiency virus)  Discussed risk of intimate contact with know drug user. - HIV Screen    5. Encounter for HCV screening test for low risk patient  Same as #4.    - Hepatitis C Antibody      FOLLOW UP:  Schedule for pap smear. Will do HPV then. Since no symptoms, doubt trichomonas. HPI    90 day follow up for controlled substance(s)  alprazolam 0.5 mg #20 pills every 30 days. OD risk:  090  Lorazepam Equiv dose:  0.62 last refill  22, 22, 5/15/22    Using medication for:  panic disorder   Functional improvement noted compared to before taking this controlled medication:  able to prevent having a panic attack severe enough to go to ER. Adverse effects:  none     Aberrant behavior: none    She tells me today that she is not taking Lexapro.   It most definitely helps her anxiety but she feels it makes her less joyful about butalbital-acetaminophen-caffeine (FIORICET, ESGIC) -40 MG per tablet TAKE ONE TABLET BY MOUTH EVERY 4 HOURS AS NEEDED FOR HEADACHES **LIMIT TO 2 DAYS PER WEEK, DO NOT USE DAILY** 30 tablet 5    Fremanezumab-vfrm (AJOVY) 225 MG/1.5ML SOSY INJECT ONE SYRINGE (1.5 MLS) UNDER THE SKIN EVERY 30 DAYS 1 each 5    metoclopramide (REGLAN) 10 MG tablet 1/2 - 1 tab at beginning of meals and if needed 1 at bedtime. Do not use more than 3 weeks in a row without breaking for a week. 90 tablet 0    Coenzyme Q10 (COQ10) 100 MG CAPS       Riboflavin 400 MG TABS       escitalopram (LEXAPRO) 5 MG tablet Take 1.5 tablets by mouth daily 45 tablet 5    fluticasone (FLONASE) 50 MCG/ACT nasal spray 1-2 sprays each nostril daily. Must be used regularly to help. 1 Bottle 12    clindamycin-benzoyl peroxide (BENZACLIN) 1-5 % gel APPLY EXTERNALLY TO THE AFFECTED AREA TWICE DAILY 150 g 3       Review of Systems    OBJECTIVE:    Vitals:    08/09/22 1047   BP: 108/80   Pulse: 72   SpO2: 97%   Weight: 222 lb (100.7 kg)       Physical Exam  Constitutional:       Appearance: Normal appearance. Eyes:      General: No scleral icterus. Pulmonary:      Effort: Pulmonary effort is normal. No respiratory distress. Skin:     Coloration: Skin is not pale. Neurological:      General: No focal deficit present. Mental Status: She is alert and oriented to person, place, and time. Psychiatric:         Mood and Affect: Mood normal.         Behavior: Behavior normal.         An electronic signature was used to authenticate this note.     Filiberto Meade MD

## 2022-08-09 NOTE — PATIENT INSTRUCTIONS
If RetinA is the brand name. It can be very drying so use it at night. If too strong, try Differen over the counter. Start 1 Buspar at night for a few days. Then take one in the morning with breakfast or take with lunch ---taking it with food IF dizziness after taking it. The 10 mg a day is still a very low dose. Plan is use during Daylight Savings time and use the Lexapro when on standard time.       Xxxxxxxxxxxxxxxxxxxxxxxxxxxxxxxxxxxxxxxxxxxxxxxxxxxxx    [email or web address]

## 2022-08-17 DIAGNOSIS — G43.109 MIGRAINE WITH AURA AND WITHOUT STATUS MIGRAINOSUS, NOT INTRACTABLE: ICD-10-CM

## 2022-08-17 RX ORDER — SUMATRIPTAN 100 MG/1
TABLET, FILM COATED ORAL
Qty: 9 TABLET | Refills: 2 | Status: SHIPPED | OUTPATIENT
Start: 2022-08-17

## 2022-08-18 ENCOUNTER — TELEPHONE (OUTPATIENT)
Dept: ADMINISTRATIVE | Age: 32
End: 2022-08-18

## 2022-08-22 ENCOUNTER — OFFICE VISIT (OUTPATIENT)
Dept: PSYCHOLOGY | Age: 32
End: 2022-08-22
Payer: COMMERCIAL

## 2022-08-22 DIAGNOSIS — F41.0 PANIC DISORDER: ICD-10-CM

## 2022-08-22 DIAGNOSIS — F41.1 GAD (GENERALIZED ANXIETY DISORDER): Primary | ICD-10-CM

## 2022-08-22 DIAGNOSIS — F90.0 ATTENTION DEFICIT HYPERACTIVITY DISORDER (ADHD), PREDOMINANTLY INATTENTIVE TYPE: ICD-10-CM

## 2022-08-22 PROCEDURE — 90832 PSYTX W PT 30 MINUTES: CPT | Performed by: PSYCHOLOGIST

## 2022-08-22 NOTE — TELEPHONE ENCOUNTER
PLEASE contact patient to verify pharmacy benefit with demographic information. BCBS states patient TERMED. Bhavesh states it doesn't go through them. Please advise. Thank you. If this requires a response please respond to the pool. [address]).

## 2022-08-22 NOTE — PROGRESS NOTES
TELEHEALTH VISIT -- Audio/Visual (During HVSTD-73 public health emergency)  }  Tequila Rodríguez was evaluated through a synchronous (real-time) audio-video encounter (via Doxy. me). The patient (or guardian, if applicable) is aware that this is a billable service, which includes applicable co-pays. This Virtual Visit was conducted with patient's (and/or legal guardian's) consent. The visit was conducted pursuant to the emergency declaration under the [address] [place] [address], [address] [place] [address] authority and the [employer] Act. Patient identification was verified, and a caregiver was present when appropriate. The patient was located in a state where the provider was licensed to provide care. Conducted a risk-benefit analysis and determined that the patient's presenting problems are consistent with the use of telepsychology. Determined that the patient has sufficient knowledge and skills in the use of technology enabling them to adequately benefit from telepsychology. It was determined that this patient was able to be properly treated without an in-person session. Patient verified that they were currently located at the [facility] address that was provided during registration or another address, if noted below.     Verified the following information:  Patient's identification: Yes  Patient location: [address] [place], [place] [address] [address]   Patient's call back number: [phone]   Patient's emergency contact's name and number, as well as permission to contact them if needed: Extended Emergency Contact Information  Primary Emergency Contact: Blair Mckeon  Address: [address] [facility], [address] [place] [address] Phone: [phone]  Work Phone: [phone]  Relation: Parent  Secondary Emergency Contact: Dorita Baumann  Address: [address] [facility]           [address], [address] Phone: [phone]  Work Phone: [phone]  Relation: Parent     Provider location: [facility]  Reilly November, Ph.D.  Psychologist  8/22/2022  9:36 AM      Time spent with Patient: 25 minutes  This is patient's  42nd   [facility] appointment. Reason for Consult:    Chief Complaint   Patient presents with    Anxiety       Feedback given to PCP. S:  Pt seen for f/u of anxiety, panic. Pt reported variable mood and sxs. She was fired for allegedly not saving a file, pt reported she saved it multiple times and they didn't check the camera to confirm. Reported she became understandably angry and shared many of her grievances. She is annoyed about all of this, but in good spirits that she doesn't have to work there anymore and was able \"to say everything I ever wanted to say. \" She is on unemployment now, has applied for 50 jobs (clinical , some jobs at [employer], [facility] [employer] jobs at [employer]). Leaves Friday for vacation, isn't particularly stressed financially. Has been cleaning and organizing her home, going on regular walks, spending time w friends she hasn't been able to see d/t her shift. Praised adaptive coping response.      O:  MSE:    Appearance    alert, cooperative  Appetite normal  Sleep disturbance No  Fatigue No  Loss of pleasure No  Impulsive behavior No  Speech    spontaneous, normal rate, normal volume, and well articulated  Mood    euthymic  Affect    normal affect  Thought Content    intact  Thought Process    linear, goal directed, and coherent  Associations    logical connections  Insight    Fair  Judgment    Intact  Orientation    oriented to person, place, time, and general circumstances  Memory    recent and remote memory intact  Attention/Concentration    impaired  Morbid ideation No  Suicide Assessment    no suicidal ideation    History:  Social History:   Social History     Socioeconomic History    Marital status: Single     Spouse name: Not on file Number of children: 0    Years of education: 12    Highest education level: Not on file   Occupational History    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    Occupation: [employer]      Comment: [employer] [place]   Tobacco Use    Smoking status: Former     Packs/day: 0.25     Years: 0.50     Pack years: 0.13     Types: Cigarettes     Start date: 2013     Quit date: 2013     Years since quittin.6    Smokeless tobacco: Never    Tobacco comments:     Denies smoking at this time   Substance and Sexual Activity    Alcohol use: Yes     Comment: h/o weekend binges 5 or more drinks when out 1-2x/week;  :  cut most to all drinking out    Drug use: Yes     Types: Marijuana Fiorella Amble)    Sexual activity: Not Currently     Partners: Male     Birth control/protection: Condom, OCP   Other Topics Concern    Not on file   Social History Narrative    Not on file     Social Determinants of Health     Financial Resource Strain: Low Risk     Difficulty of Paying Living Expenses: Not hard at all   Food Insecurity: No Food Insecurity    Worried About Running Out of Food in the Last Year: Never true    Ran Out of Food in the Last Year: Never true   Transportation Needs: Not on file   Physical Activity: Not on file   Stress: Not on file   Social Connections: Not on file   Intimate Partner Violence: Not on file   Housing Stability: Not on file     TOBACCO:   reports that she quit smoking about 8 years ago. Her smoking use included cigarettes. She started smoking about 8 years ago. She has a 0.13 pack-year smoking history. She has never used smokeless tobacco.  ETOH:   reports current alcohol use. Diagnosis:  1. VERA (generalized anxiety disorder)    2. Attention deficit hyperactivity disorder (ADHD), predominantly inattentive type    3.  Panic disorder          Plan:  Pt interventions:  Discussed self-care (sleep, nutrition, rewarding activities, social support, exercise), Supportive techniques, and Identified maladaptive thoughts        Documentation was done using voice recognition dragon software. Every effort was made to ensure accuracy; however, inadvertent, unintentional computerized transcription errors may be present.

## 2022-09-30 ENCOUNTER — TELEMEDICINE (OUTPATIENT)
Dept: PSYCHOLOGY | Age: 32
End: 2022-09-30
Payer: COMMERCIAL

## 2022-09-30 DIAGNOSIS — F41.1 GAD (GENERALIZED ANXIETY DISORDER): Primary | ICD-10-CM

## 2022-09-30 DIAGNOSIS — F41.0 PANIC DISORDER: ICD-10-CM

## 2022-09-30 DIAGNOSIS — F90.0 ATTENTION DEFICIT HYPERACTIVITY DISORDER (ADHD), PREDOMINANTLY INATTENTIVE TYPE: ICD-10-CM

## 2022-09-30 PROCEDURE — 90837 PSYTX W PT 60 MINUTES: CPT | Performed by: PSYCHOLOGIST
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
## 2022-10-26 ENCOUNTER — TELEMEDICINE (OUTPATIENT)
Dept: PSYCHOLOGY | Age: 32
End: 2022-10-26

## 2022-10-26 DIAGNOSIS — F90.0 ATTENTION DEFICIT HYPERACTIVITY DISORDER (ADHD), PREDOMINANTLY INATTENTIVE TYPE: ICD-10-CM

## 2022-10-26 DIAGNOSIS — F41.0 PANIC DISORDER: ICD-10-CM

## 2022-10-26 DIAGNOSIS — F41.1 GAD (GENERALIZED ANXIETY DISORDER): Primary | ICD-10-CM

## 2022-10-26 PROCEDURE — 90832 PSYTX W PT 30 MINUTES: CPT | Performed by: PSYCHOLOGIST

## 2022-10-26 NOTE — PROGRESS NOTES
TELEHEALTH VISIT -- Audio/Visual (During QOCDR-64 public health emergency)  }  Tarsha Paz was evaluated through a synchronous (real-time) audio-video encounter (via Doxy. me). The patient (or guardian, if applicable) is aware that this is a billable service, which includes applicable co-pays. This Virtual Visit was conducted with patient's (and/or legal guardian's) consent. The visit was conducted pursuant to the emergency declaration under the [address] [place] [address], [address] [place] [address] authority and the [employer] Act. Patient identification was verified, and a caregiver was present when appropriate. The patient was located in a state where the provider was licensed to provide care. Conducted a risk-benefit analysis and determined that the patient's presenting problems are consistent with the use of telepsychology. Determined that the patient has sufficient knowledge and skills in the use of technology enabling them to adequately benefit from telepsychology. It was determined that this patient was able to be properly treated without an in-person session. Patient verified that they were currently located at the [facility] address that was provided during registration or another address, if noted below.     Verified the following information:  Patient's identification: Yes  Patient location: [address] [place], [place] [address] [address]   Patient's call back number: [phone]   Patient's emergency contact's name and number, as well as permission to contact them if needed: Extended Emergency Contact Information  Primary Emergency Contact: Blair Mckeon  Address: [address] [facility], [address] [place]  Phone: [phone]  Work Phone: [phone]  Relation: Parent  Secondary Emergency Contact: Dorita Baumann  Address: [address] [facility] [address], [address] [employer] of [address] Phone: [phone]  Work Phone: [phone]  Relation: Parent     Provider location: [facility]  Shea Acosta, Ph.D.  Psychologist  10/26/2022  10:02 AM      Time spent with Patient: 28 minutes  This is patient's  44th   [facility] appointment. Reason for Consult:    Chief Complaint   Patient presents with    Anxiety       Feedback given to PCP. S:  Pt seen for f/u of depression and anxiety w panic. Pt reported stable mood and sxs despite struggling again w messiness and inconsistency w cleaning. Showed room w clothes and baskets everywhere. Reported she can deep clean a room, but gets overstimulated in messy rooms and needs to decompress. Discussed limiting these breaks to the time needed to decompress. Self-defeating language from \"I can't\" to \"it's difficult for me. \"     Lidia Mendoza doing enough house/ to pay her bills and have some extra.     O:  MSE:  Appearance    alert, cooperative  Appetite normal  Sleep disturbance No  Fatigue No  Loss of pleasure No  Impulsive behavior No  Speech    spontaneous, normal rate, normal volume, and well articulated  Mood    euthymic  Affect    normal affect  Thought Content    intact  Thought Process    linear, goal directed, and coherent  Associations    logical connections  Insight    Fair  Judgment    Intact  Orientation    oriented to person, place, time, and general circumstances  Memory    recent and remote memory intact  Attention/Concentration    impaired  Morbid ideation No  Suicide Assessment    no suicidal ideation    History:  Social History:   Social History     Socioeconomic History    Marital status: Single     Spouse name: Not on file    Number of children: 0    Years of education: 12    Highest education level: Not on file   Occupational History    Occupation:  grad     Comment: biology    Occupation: [employer]      Comment: [employer]   Tobacco Use    Smoking status: Former     Packs/day: 0.25 Years: 0.50     Pack years: 0.13     Types: Cigarettes     Start date: 2013     Quit date: 2013     Years since quittin.8    Smokeless tobacco: Never    Tobacco comments:     Denies smoking at this time   Substance and Sexual Activity    Alcohol use: Yes     Comment: h/o weekend binges 5 or more drinks when out 1-2x/week;  :  cut most to all drinking out    Drug use: Yes     Types: Marijuana Brittani Bryant)    Sexual activity: Not Currently     Partners: Male     Birth control/protection: Condom, OCP   Other Topics Concern    Not on file   Social History Narrative    Not on file     Social Determinants of Health     Financial Resource Strain: Low Risk     Difficulty of Paying Living Expenses: Not hard at all   Food Insecurity: No Food Insecurity    Worried About Running Out of Food in the Last Year: Never true    Ran Out of Food in the Last Year: Never true   Transportation Needs: Not on file   Physical Activity: Not on file   Stress: Not on file   Social Connections: Not on file   Intimate Partner Violence: Not on file   Housing Stability: Not on file     TOBACCO:   reports that she quit smoking about 8 years ago. Her smoking use included cigarettes. She started smoking about 8 years ago. She has a 0.13 pack-year smoking history. She has never used smokeless tobacco.  ETOH:   reports current alcohol use. Diagnosis:  1. VERA (generalized anxiety disorder)    2. Attention deficit hyperactivity disorder (ADHD), predominantly inattentive type    3. Panic disorder          Plan:  Pt interventions:  Trained in strategies for increasing balanced thinking, Supportive techniques, CBT to target maladaptive thoughts, and Identified maladaptive thoughts        Documentation was done using voice recognition dragon software. Every effort was made to ensure accuracy; however, inadvertent, unintentional computerized transcription errors may be present.

## 2022-11-04 ENCOUNTER — OFFICE VISIT (OUTPATIENT)
Dept: INTERNAL MEDICINE CLINIC | Age: 32
End: 2022-11-04

## 2022-11-04 VITALS
BODY MASS INDEX: 33.59 KG/M2 | HEART RATE: 76 BPM | SYSTOLIC BLOOD PRESSURE: 110 MMHG | OXYGEN SATURATION: 99 % | WEIGHT: 221.6 LBS | HEIGHT: 68 IN | DIASTOLIC BLOOD PRESSURE: 70 MMHG

## 2022-11-04 DIAGNOSIS — F32.89 OTHER DEPRESSION: ICD-10-CM

## 2022-11-04 DIAGNOSIS — G43.109 MIGRAINE WITH AURA AND WITHOUT STATUS MIGRAINOSUS, NOT INTRACTABLE: ICD-10-CM

## 2022-11-04 DIAGNOSIS — Z79.899 CHRONIC USE OF BENZODIAZEPINE FOR THERAPEUTIC PURPOSE: ICD-10-CM

## 2022-11-04 DIAGNOSIS — F41.0 PANIC DISORDER: Primary | ICD-10-CM

## 2022-11-04 PROCEDURE — 99213 OFFICE O/P EST LOW 20 MIN: CPT | Performed by: FAMILY MEDICINE

## 2022-11-04 RX ORDER — ESCITALOPRAM OXALATE 5 MG/1
5 TABLET ORAL DAILY
Qty: 90 TABLET | Refills: 1 | Status: SHIPPED | OUTPATIENT
Start: 2022-11-04

## 2022-11-04 RX ORDER — FLUTICASONE PROPIONATE 50 MCG
SPRAY, SUSPENSION (ML) NASAL
Qty: 16 G | Refills: 12 | Status: SHIPPED | OUTPATIENT
Start: 2022-11-04

## 2022-11-04 NOTE — PATIENT INSTRUCTIONS
Suspect yellow sac spider bite. They can really cause swelling and more bumps. EMDR or other similar therapies. Can use propranolol AS needed. It can also help reduce or prevent the migraine, especially premenstrually. Otherwise you can try hydroxyzine if at bedtime.

## 2022-11-04 NOTE — PROGRESS NOTES
2022    Martín Cross (:  1990) is a 36 y.o. female, here for evaluation of the following chief complaint(s):  Follow-up (Med check)        ASSESSMENT/PLAN:    1. Panic disorder  Continue propranolol prn and then go to alprazolam if the propranolol is not helpful.    - escitalopram (LEXAPRO) 5 MG tablet; Take 1 tablet by mouth daily  Dispense: 90 tablet; Refill: 1    2. Other depression  PHQ Scores 2022 2022 2021 3/9/2020 2020 2019 10/28/2019   PHQ2 Score 0 0 0 4 0 4 2   PHQ9 Score 0 0 0 17 0 18 18     Interpretation of Total Score Depression Severity: 1-4 = Minimal depression, 5-9 = Mild depression, 10-14 = Moderate depression, 15-19 = Moderately severe depression, 20-27 = Severe depression    Tends to get worse in the winter so will start back on medication. - escitalopram (LEXAPRO) 5 MG tablet; Take 1 tablet by mouth daily  Dispense: 90 tablet; Refill: 1    3. Chronic use of benzodiazepine for therapeutic purpose  OARRS profile checked and is valid. 4. Migraine with aura and without status migrainosus, not intractable  Use propranolol premenstrually. She is getting decent response to  [address]. She is hoping it will be on Medicaid formulary. She will be on Medicaid next month   Prior to [address] or Ajovy use, she would get 15-20 migraines per month. With use of these medications, she is down to 3 per month. FOLLOW UP:  Return in about 12 weeks (around 2023). HPI  FU of Alprazolam 0.5 mg tablet:  use for panic disorder and FU of migraines. Left ankle got some bites. She did stay at her friends home Halloween in spare bedroom. The next day she noted a bite or cluster of spots down by the left ankle and then it got very very swollen. The swelling is starting to finally go down and the bite spots are fading. This was noted 3 days ago.         90 day follow up for controlled substance(s)  Panic attacks  OD risk:  not elevated at 0  LME 0.6 mg per day average. LR on 10/24, 9/22, 8/18. Uses 20 pills per month. She would like to restart Lexapro. She gets bad or worse in the winter. Neeraj Hidalgo shows she needs low dose for this medication. She is doing calm kelsea and brown noise and trying to breathe and calm herself down. It helps some. Migraines:  active today. Her period just started today and this could have triggered a bad night last night. Outpatient Medications Marked as Taking for the 11/4/22 encounter (Office Visit) with Toi Walker MD   Medication Sig Dispense Refill    SUMAtriptan (IMITREX) 100 MG tablet TAKE ONE TABLET BY MOUTH AT ONSET OF HEADACHE; MAY REPEAT ONE TABLET IN 2 HOURS IF NEEDED (MAX OF 2 TABLETS PER DAY) 9 tablet 2    ondansetron (ZOFRAN ODT) 4 MG disintegrating tablet Take 1 tablet by mouth every 4-6 hours as needed for Nausea 12 tablet 0    oxymetazoline (AFRIN NODRIP EXTRA MOISTURE) 0.05 % nasal spray Use 2 sprays each nostril twice a day for up to 7 days      Rimegepant Sulfate (NURTEC) 75 MG TBDP Dissolve one under tongue at earliest onset of migraine. Maximum of 1 per 24 hours. 8 tablet 2    hydrOXYzine pamoate (VISTARIL) 25 MG capsule 25 mg at bedtime for 3 nights to break the cycle of headache then PRN 30 capsule 3    Erenumab-aooe (AIMOVIG) 140 MG/ML SOAJ Inject 140 mg into the skin every 30 days 3 mL 2    butalbital-acetaminophen-caffeine (FIORICET, ESGIC) -40 MG per tablet TAKE ONE TABLET BY MOUTH EVERY 4 HOURS AS NEEDED FOR HEADACHES **LIMIT TO 2 DAYS PER WEEK, DO NOT USE DAILY** 30 tablet 5    Fremanezumab-vfrm (AJOVY) 225 MG/1.5ML SOSY INJECT ONE SYRINGE (1.5 MLS) UNDER THE SKIN EVERY 30 DAYS 1 each 5    metoclopramide (REGLAN) 10 MG tablet 1/2 - 1 tab at beginning of meals and if needed 1 at bedtime. Do not use more than 3 weeks in a row without breaking for a week.  90 tablet 0    Coenzyme Q10 (COQ10) 100 MG CAPS       Riboflavin 400 MG TABS       propranolol (INDERAL) 10 MG tablet TAKE 1-2 TABLETS BY MOUTH THREE TIMES DAILY AS NEEDED(ANXIETY, PANIC ATTACK) 30 tablet 5    fluticasone (FLONASE) 50 MCG/ACT nasal spray 1-2 sprays each nostril daily. Must be used regularly to help. 1 Bottle 12    omeprazole (PRILOSEC) 20 MG delayed release capsule Take 1 capsule by mouth daily as needed (for flare up. take until well and then stop for a while) 30 capsule 3    naproxen sodium (ANAPROX) 550 MG tablet Take one at onset of migraine or musculoskeletal pain and repeat in 6 hours or more but maximum of 2 doses per day. 60 tablet 2    clindamycin-benzoyl peroxide (BENZACLIN) 1-5 % gel APPLY EXTERNALLY TO THE AFFECTED AREA TWICE DAILY 150 g 3    cetirizine (ZYRTEC) 10 MG tablet Take 1 tablet by mouth daily Works best to take at bedtime. 90 tablet 3       Review of Systems    OBJECTIVE:    Vitals:    11/04/22 0929   BP: 110/70   Pulse: 76   SpO2: 99%   Weight: 221 lb 9.6 oz (100.5 kg)   Height: 5' 8\" (1.727 m)       Physical Exam  Vitals reviewed. Constitutional:       Appearance: She is well-developed. Cardiovascular:      Rate and Rhythm: Normal rate and regular rhythm. Heart sounds: Normal heart sounds. Pulmonary:      Effort: Pulmonary effort is normal.      Breath sounds: Normal breath sounds. Skin:     General: Skin is warm and dry. Coloration: Skin is not pale. Findings: Rash present. No erythema. Comments: Left dorsolateral ankle with fading pink papules c/w small spider bites. Psychiatric:         Behavior: Behavior normal.         An electronic signature was used to authenticate this note.     Miguel Armijo MD

## 2022-11-23 ENCOUNTER — TELEMEDICINE (OUTPATIENT)
Dept: PSYCHOLOGY | Age: 32
End: 2022-11-23

## 2022-11-23 DIAGNOSIS — F41.1 GAD (GENERALIZED ANXIETY DISORDER): Primary | ICD-10-CM

## 2022-11-23 DIAGNOSIS — F41.0 PANIC DISORDER: ICD-10-CM

## 2022-11-23 DIAGNOSIS — F90.0 ATTENTION DEFICIT HYPERACTIVITY DISORDER (ADHD), PREDOMINANTLY INATTENTIVE TYPE: ICD-10-CM

## 2022-11-23 NOTE — PROGRESS NOTES
[phone]  Work Phone: [phone]  Relation: Parent     Provider location: [facility]  Kimberly Sims, Ph.D.  Psychologist  11/23/2022  11:44 AM      Time spent with Patient: 27 minutes  This is patient's  45th   [facility] appointment. Reason for Consult:    Chief Complaint   Patient presents with    Anxiety       Feedback given to PCP. S:  Pt seen for f/u of anxiety, depression, panic. Pt reported improved mood and sxs. Friend helped her deep clean her apartment and he done well maintaining for 2 wks. Is using a sustainable routine of picking up 5 things when she wakes up and 5 things before bed, usually does more bc this manages her initiation motivation. Continues to have periodic panic attacks, but rare. Is taking ashwaganda gummies, using happy light, and daily walks \"this is the happiest winter I've had. \" Picked up lexapro, but hasn't felt need to take it yet. Has had only 1 dream about ex recently. Only has 2 more mos of unemployment, continues to apply to at least 3 jobs/wk and hasn't gotten an interview. Is feeling disheartened.      O:  MSE:  Appearance    alert, cooperative  Appetite normal  Sleep disturbance no  Fatigue No  Loss of pleasure No  Impulsive behavior No  Speech    spontaneous, normal rate, normal volume, and well articulated  Mood    euthymic  Affect    normal affect  Thought Content    intact  Thought Process    linear, goal directed, and coherent  Associations    logical connections  Insight    Fair  Judgment    Intact  Orientation    oriented to person, place, time, and general circumstances  Memory    recent and remote memory intact  Attention/Concentration    impaired  Morbid ideation No  Suicide Assessment    no suicidal ideation    History:  Social History:   Social History     Socioeconomic History    Marital status: Single     Spouse name: Not on file    Number of children: 0    Years of education: 12    Highest education level: Not on file   Occupational History    Occupation:  grad     Comment: biology    Occupation: [employer]      Comment: [address]   Tobacco Use    Smoking status: Former     Packs/day: 0.25     Years: 0.50     Pack years: 0.13     Types: Cigarettes     Start date: 2013     Quit date: 2013     Years since quittin.9    Smokeless tobacco: Never    Tobacco comments:     Denies smoking at this time   Substance and Sexual Activity    Alcohol use: Yes     Comment: h/o weekend binges 5 or more drinks when out 1-2x/week;  :  cut most to all drinking out    Drug use: Yes     Types: Marijuana [place])    Sexual activity: Not Currently     Partners: Male     Birth control/protection: Condom, OCP   Other Topics Concern    Not on file   Social History Narrative    Not on file     Social Determinants of Health     Financial Resource Strain: Not on file   Food Insecurity: Not on file   Transportation Needs: Not on file   Physical Activity: Not on file   Stress: Not on file   Social Connections: Not on file   Intimate Partner Violence: Not on file   Housing Stability: Not on file     TOBACCO:   reports that she quit smoking about 8 years ago. Her smoking use included cigarettes. She started smoking about 9 years ago. She has a 0.13 pack-year smoking history. She has never used smokeless tobacco.  ETOH:   reports current alcohol use. Diagnosis:  1. VERA (generalized anxiety disorder)    2. Attention deficit hyperactivity disorder (ADHD), predominantly inattentive type    3.  Panic disorder          Plan:  Pt interventions:  Discussed and problem-solved barriers in adhering to behavioral change plan, Motivational Interviewing to increase patient confidence and compliance with adhering to behavioral change plan, Motivational Interviewing to determine importance and readiness for change, Supportive techniques, and Identified maladaptive thoughts        Documentation was done using voice recognition dragon software. Every effort was made to ensure accuracy; however, inadvertent, unintentional computerized transcription errors may be present.

## 2022-11-25 DIAGNOSIS — F41.0 PANIC DISORDER: ICD-10-CM

## 2022-11-26 RX ORDER — ALPRAZOLAM 0.5 MG/1
TABLET ORAL
Qty: 20 TABLET | Refills: 0 | Status: SHIPPED | OUTPATIENT
Start: 2022-11-26 | End: 2022-12-26

## 2022-11-27 ENCOUNTER — E-VISIT (OUTPATIENT)
Dept: INTERNAL MEDICINE CLINIC | Age: 32
End: 2022-11-27

## 2022-11-27 DIAGNOSIS — J01.90 ACUTE RHINOSINUSITIS: Primary | ICD-10-CM

## 2022-11-27 PROCEDURE — 99421 OL DIG E/M SVC 5-10 MIN: CPT | Performed by: FAMILY MEDICINE

## 2022-11-27 ASSESSMENT — LIFESTYLE VARIABLES
SMOKING_YEARS: 1
PACKS_PER_DAY: 0
SMOKING_STATUS: NO, I'M A FORMER SMOKER

## 2022-11-28 RX ORDER — AMOXICILLIN AND CLAVULANATE POTASSIUM 875; 125 MG/1; MG/1
TABLET, FILM COATED ORAL
Qty: 20 TABLET | Refills: 0 | Status: SHIPPED | OUTPATIENT
Start: 2022-11-28 | End: 2022-12-08

## 2022-11-28 NOTE — PROGRESS NOTES
SUBJECTIVE:  See e-visit questionnaire. Medications, allergies reviewed. Current Outpatient Medications on File Prior to Visit   Medication Sig Dispense Refill    ALPRAZolam (XANAX) 0.5 MG tablet TAKE 1 TABLET BY MOUTH AT ONSET OF PANIC ATTACK AND REPEAT SECOND DOSE IN ONE HOUR IF NOT RESOLVED. MAXIMUM DAILY DOSE OF 2 PER DAY. THE 20 TABLETS MUST LAST 30 DAYS 20 tablet 0    escitalopram (LEXAPRO) 5 MG tablet Take 1 tablet by mouth daily 90 tablet 1    fluticasone (FLONASE) 50 MCG/ACT nasal spray 1-2 sprays each nostril daily. 16 g 12    SUMAtriptan (IMITREX) 100 MG tablet TAKE ONE TABLET BY MOUTH AT ONSET OF HEADACHE; MAY REPEAT ONE TABLET IN 2 HOURS IF NEEDED (MAX OF 2 TABLETS PER DAY) 9 tablet 2    busPIRone (BUSPAR) 5 MG tablet Take 1 tablet by mouth in the morning and 1 tablet before bedtime. (Patient not taking: Reported on 11/4/2022) 60 tablet 2    ondansetron (ZOFRAN ODT) 4 MG disintegrating tablet Take 1 tablet by mouth every 4-6 hours as needed for Nausea 12 tablet 0    nirmatrelvir/ritonavir (PAXLOVID) 20 x 150 MG & 10 x 100MG Take 3 tablets (two 150 mg nirmatrelvir and one 100 mg ritonavir tablets) by mouth every 12 hours for 5 days. (Patient not taking: Reported on 11/4/2022) 30 tablet 0    oxymetazoline (AFRIN NODRIP EXTRA MOISTURE) 0.05 % nasal spray Use 2 sprays each nostril twice a day for up to 7 days      Rimegepant Sulfate (NURTEC) 75 MG TBDP Dissolve one under tongue at earliest onset of migraine. Maximum of 1 per 24 hours.  8 tablet 2    hydrOXYzine pamoate (VISTARIL) 25 MG capsule 25 mg at bedtime for 3 nights to break the cycle of headache then PRN 30 capsule 3    Erenumab-aooe (AIMOVIG) 140 MG/ML SOAJ Inject 140 mg into the skin every 30 days 3 mL 2    butalbital-acetaminophen-caffeine (FIORICET, ESGIC) -40 MG per tablet TAKE ONE TABLET BY MOUTH EVERY 4 HOURS AS NEEDED FOR HEADACHES **LIMIT TO 2 DAYS PER WEEK, DO NOT USE DAILY** 30 tablet 5    Fremanezumab-vfrm (AJOVY) 225 MG/1.5ML SOSY INJECT ONE SYRINGE (1.5 MLS) UNDER THE SKIN EVERY 30 DAYS 1 each 5    metoclopramide (REGLAN) 10 MG tablet 1/2 - 1 tab at beginning of meals and if needed 1 at bedtime. Do not use more than 3 weeks in a row without breaking for a week. 90 tablet 0    amphetamine-dextroamphetamine (ADDERALL XR) 10 MG extended release capsule Take 1 capsule by mouth daily as needed (need for ADD treatment to get work done.) for up to 30 days. 30 capsule 0    Coenzyme Q10 (COQ10) 100 MG CAPS       Riboflavin 400 MG TABS       propranolol (INDERAL) 10 MG tablet TAKE 1-2 TABLETS BY MOUTH THREE TIMES DAILY AS NEEDED(ANXIETY, PANIC ATTACK) 30 tablet 5    omeprazole (PRILOSEC) 20 MG delayed release capsule Take 1 capsule by mouth daily as needed (for flare up. take until well and then stop for a while) 30 capsule 3    naproxen sodium (ANAPROX) 550 MG tablet Take one at onset of migraine or musculoskeletal pain and repeat in 6 hours or more but maximum of 2 doses per day. 60 tablet 2    clindamycin-benzoyl peroxide (BENZACLIN) 1-5 % gel APPLY EXTERNALLY TO THE AFFECTED AREA TWICE DAILY 150 g 3    cetirizine (ZYRTEC) 10 MG tablet Take 1 tablet by mouth daily Works best to take at bedtime. 90 tablet 3     No current facility-administered medications on file prior to visit. OBJECTIVE:  NA    ASSESSMENT:  1. Acute rhinosinusitis  May still be viral but she says mucous was yellow on day #1.    - amoxicillin-clavulanate (AUGMENTIN) 875-125 MG per tablet; 1 twice daily:  Take with breakfast and supper and at least 8 ounces of liquid to prevent side effects. Dispense: 20 tablet; Refill: 0      PLAN:  See orders and patient message for further instructions. 10 minutes were spent on digital evaluation and management.

## 2022-12-08 ENCOUNTER — TELEPHONE (OUTPATIENT)
Dept: ADMINISTRATIVE | Age: 32
End: 2022-12-08

## 2022-12-08 NOTE — TELEPHONE ENCOUNTER
Submitted PA for Aimovig 140MG/ML auto-injectors. Olivarez: Hernandez Manzano. Via CMM STATUS: NOT SENT    CMM is asking for the frequency of migraine headaches at baseline (before Aimovig) and now. Form scanned in. Please advise. If this requires a response please respond to the pool ( P [facility] [address]).

## 2022-12-13 ENCOUNTER — TELEPHONE (OUTPATIENT)
Dept: INTERNAL MEDICINE CLINIC | Age: 32
End: 2022-12-13

## 2022-12-13 NOTE — TELEPHONE ENCOUNTER
Patient reports she had 15-20 migraines per month prior to medication and since taking medication, she only has about 3 per month.

## 2022-12-13 NOTE — TELEPHONE ENCOUNTER
I did an addendum to her office visit from November 2022. I have the paperwork at home.    Will try to have someone bring to office by Friday so the prior authorization can be finalized

## 2022-12-13 NOTE — TELEPHONE ENCOUNTER
Aimovig prior authorization. Your insurance needs to know how many migraines you had per month before taking this medication or similar medication and how many you have per month now on the medication. They will not approve this without this information. I do not have an migraine diaries or calenders in your chart.

## 2022-12-19 DIAGNOSIS — G43.109 MIGRAINE WITH AURA AND WITHOUT STATUS MIGRAINOSUS, NOT INTRACTABLE: ICD-10-CM

## 2022-12-19 RX ORDER — FLUTICASONE PROPIONATE 50 MCG
SPRAY, SUSPENSION (ML) NASAL
Qty: 16 G | Refills: 11 | Status: SHIPPED | OUTPATIENT
Start: 2022-12-19

## 2022-12-19 RX ORDER — BUTALBITAL, ACETAMINOPHEN AND CAFFEINE 50; 325; 40 MG/1; MG/1; MG/1
TABLET ORAL
Qty: 30 TABLET | Refills: 5 | Status: SHIPPED | OUTPATIENT
Start: 2022-12-19

## 2022-12-19 RX ORDER — ONDANSETRON 4 MG/1
4 TABLET, ORALLY DISINTEGRATING ORAL
Qty: 12 TABLET | Refills: 0 | Status: SHIPPED | OUTPATIENT
Start: 2022-12-19 | End: 2023-01-14

## 2022-12-20 DIAGNOSIS — F41.0 PANIC DISORDER: ICD-10-CM

## 2022-12-21 RX ORDER — PROPRANOLOL HYDROCHLORIDE 10 MG/1
TABLET ORAL
Qty: 30 TABLET | Refills: 5 | Status: SHIPPED | OUTPATIENT
Start: 2022-12-21

## 2022-12-21 NOTE — TELEPHONE ENCOUNTER
Received an APPROVAL for Aimovig 140MG/ML auto-injectors from 12/8/2022-12/8/2023. Letter attached. If this requires a response please respond to the pool ( P [facility] [address] [facility]). Thank you please advise patient.

## 2022-12-22 ENCOUNTER — TELEMEDICINE (OUTPATIENT)
Dept: PSYCHOLOGY | Age: 32
End: 2022-12-22
Payer: MEDICAID

## 2022-12-22 DIAGNOSIS — F90.0 ATTENTION DEFICIT HYPERACTIVITY DISORDER (ADHD), PREDOMINANTLY INATTENTIVE TYPE: ICD-10-CM

## 2022-12-22 DIAGNOSIS — F41.1 GAD (GENERALIZED ANXIETY DISORDER): Primary | ICD-10-CM

## 2022-12-22 DIAGNOSIS — F41.0 PANIC DISORDER: ICD-10-CM

## 2022-12-22 PROCEDURE — 90832 PSYTX W PT 30 MINUTES: CPT | Performed by: PSYCHOLOGIST

## 2022-12-22 NOTE — PROGRESS NOTES
TELEHEALTH VISIT -- Audio/Visual (During QSOAD-76 public health emergency)  }  Rachel Delarosa was evaluated through a synchronous (real-time) audio-video encounter (via [employer]). The patient (or guardian, if applicable) is aware that this is a billable service, which includes applicable co-pays. This Virtual Visit was conducted with patient's (and/or legal guardian's) consent. The visit was conducted pursuant to the emergency declaration under the [address] [place] [address], [address] [place] [address] authority and the [employer] General Act. Patient identification was verified, and a caregiver was present when appropriate. The patient was located in a state where the provider was licensed to provide care. Conducted a risk-benefit analysis and determined that the patient's presenting problems are consistent with the use of telepsychology. Determined that the patient has sufficient knowledge and skills in the use of technology enabling them to adequately benefit from telepsychology. It was determined that this patient was able to be properly treated without an in-person session. Patient verified that they were currently located at the [facility] address that was provided during registration or another address, if noted below.     Verified the following information:  Patient's identification: Yes  Patient location[place] [address] [place], [place] [address] [address]   Patient's call back number: [phone]   Patient's emergency contact's name and number, as well as permission to contact them if needed: Extended Emergency Contact Information  Primary Emergency Contact: Dorita Baumann  Address: [address] [place] [address], [address] [place] [address] Phone: [phone]  Work Phone: [phone]  Relation: Parent  Secondary Emergency Contact: [address] [place] [address] Phone: [phone]  Relation: Parent     Provider location: [facility]  Michi oRsa, Ph.D.  Psychologist  12/22/2022  2:58 PM      Time spent with Patient: 28 minutes  This is patient's  46th   [facility] appointment. Reason for Consult:    Chief Complaint   Patient presents with    Anxiety       Feedback given to PCP. S:  Pt seen for f/u of depression, anxiety, and ADHD. Pt reported variable mood and sxs. Did engage in sexual intercourse w man mentioned last few visits (Ving). Disappointed she becomes \"obsessed\" in any new dating situation. Discussed difference btwn thought and action. Wanted to address comparison issues. Insecurities when someone is coming over, feeling like everything has to be perfect. Noted her mom will often talk negatively about weight, clothing choices, etc.     Wants to revisit anxiety re: meeting new people. \"Do it for the plot\" to power through.      O:  MSE:  Appearance    alert, cooperative  Appetite normal  Sleep disturbance no  Fatigue No  Loss of pleasure No  Impulsive behavior No  Speech    spontaneous, normal rate, normal volume, and well articulated  Mood    anxious  Affect    anxious affect  Thought Content    intact  Thought Process    linear, goal directed, and coherent  Associations    logical connections  Insight    Fair  Judgment    Intact  Orientation    oriented to person, place, time, and general circumstances  Memory    recent and remote memory intact  Attention/Concentration    impaired  Morbid ideation No  Suicide Assessment    no suicidal ideation    History:  Social History:   Social History     Socioeconomic History    Marital status: Single     Spouse name: Not on file    Number of children: 0    Years of education: 12    Highest education level: Not on file   Occupational History    Occupation: [employer]     Comment: biology    Occupation: [employer]      Comment: [employer]   Tobacco Use    Smoking status: Former     Packs/day: 0.25     Years: 0.50     Pack years: 0.13     Types: Cigarettes     Start date: 2013     Quit date: 2013     Years since quittin.9    Smokeless tobacco: Never    Tobacco comments:     Denies smoking at this time   Substance and Sexual Activity    Alcohol use: Yes     Comment: h/o weekend binges 5 or more drinks when out 1-2x/week;  :  cut most to all drinking out    Drug use: Yes     Types: Marijuana [place])    Sexual activity: Not Currently     Partners: Male     Birth control/protection: Condom, OCP   Other Topics Concern    Not on file   Social History Narrative    Not on file     Social Determinants of Health     Financial Resource Strain: Not on file   Food Insecurity: Not on file   Transportation Needs: Not on file   Physical Activity: Not on file   Stress: Not on file   Social Connections: Not on file   Intimate Partner Violence: Not on file   Housing Stability: Not on file     TOBACCO:   reports that she quit smoking about 8 years ago. Her smoking use included cigarettes. She started smoking about 9 years ago. She has a 0.13 pack-year smoking history. She has never used smokeless tobacco.  ETOH:   reports current alcohol use. Diagnosis:  1. VERA (generalized anxiety disorder)    2. Attention deficit hyperactivity disorder (ADHD), predominantly inattentive type    3. Panic disorder          Plan:  Pt interventions:  Trained in strategies for increasing balanced thinking, Discussed self-care (sleep, nutrition, rewarding activities, social support, exercise), Supportive techniques, and Identified maladaptive thoughts        Documentation was done using voice recognition dragon software. Every effort was made to ensure accuracy; however, inadvertent, unintentional computerized transcription errors may be present.

## 2022-12-27 ENCOUNTER — TELEPHONE (OUTPATIENT)
Dept: ORTHOPEDIC SURGERY | Age: 32
End: 2022-12-27

## 2022-12-27 NOTE — TELEPHONE ENCOUNTER
Submitted PA for Nurtec 75MG dispersible tablets  [facility]ia [facility] Key: [id number] STATUS: PENDING

## 2022-12-28 NOTE — TELEPHONE ENCOUNTER
Received an APPROVAL for Nurtec 75MG dispersible tablets from 12/27/2022-6/27/2023.  Letter attached.    If this requires a response please respond to the pool ( P [facility] PRE-AUTH).      Thank you please advise patient.

## 2022-12-29 DIAGNOSIS — F41.0 PANIC DISORDER: ICD-10-CM

## 2022-12-29 RX ORDER — ALPRAZOLAM 0.5 MG/1
TABLET ORAL
Qty: 20 TABLET | OUTPATIENT
Start: 2022-12-29

## 2022-12-29 RX ORDER — ALPRAZOLAM 0.5 MG/1
TABLET ORAL
Qty: 20 TABLET | Refills: 0 | Status: SHIPPED | OUTPATIENT
Start: 2022-12-29 | End: 2023-01-28

## 2022-12-29 NOTE — TELEPHONE ENCOUNTER
Future Appointments    Encounter Information    Provider Department Appt Notes   1/26/2023 Ronaldo Kohler, PhD [facility] IM Psychology f/u - vv   1/27/2023 Deric Arriaza MD [facility] Internal Medicine 12 we f/u     Past Visits    Date Provider Specialty Visit Type Primary Dx   12/22/2022 Ronaldo Kohler, PhD Psychology Telemedicine VERA (generalized anxiety disorder)   11/23/2022 Ronaldo Kohler, PhD Psychology Telemedicine VERA (generalized anxiety disorder)   11/04/2022 Deric Arriaza MD Internal Medicine Office Visit Panic disorder

## 2023-01-14 RX ORDER — ONDANSETRON 4 MG/1
TABLET, ORALLY DISINTEGRATING ORAL
Qty: 12 TABLET | Refills: 5 | Status: SHIPPED | OUTPATIENT
Start: 2023-01-14

## 2023-01-18 ENCOUNTER — OFFICE VISIT (OUTPATIENT)
Dept: INTERNAL MEDICINE CLINIC | Age: 33
End: 2023-01-18
Payer: MEDICAID

## 2023-01-18 VITALS
DIASTOLIC BLOOD PRESSURE: 80 MMHG | TEMPERATURE: 99.3 F | SYSTOLIC BLOOD PRESSURE: 118 MMHG | HEIGHT: 68 IN | BODY MASS INDEX: 33.69 KG/M2 | HEART RATE: 76 BPM

## 2023-01-18 DIAGNOSIS — L92.3 TATTOO REACTION: ICD-10-CM

## 2023-01-18 DIAGNOSIS — R59.1 LYMPHADENOPATHY: Primary | ICD-10-CM

## 2023-01-18 PROCEDURE — 99213 OFFICE O/P EST LOW 20 MIN: CPT | Performed by: FAMILY MEDICINE

## 2023-01-18 RX ORDER — CEPHALEXIN 500 MG/1
500 CAPSULE ORAL 2 TIMES DAILY
Qty: 14 CAPSULE | Refills: 0 | Status: SHIPPED | OUTPATIENT
Start: 2023-01-18 | End: 2023-01-25

## 2023-01-18 ASSESSMENT — PATIENT HEALTH QUESTIONNAIRE - PHQ9: DEPRESSION UNABLE TO ASSESS: URGENT/EMERGENT SITUATION

## 2023-01-18 NOTE — PROGRESS NOTES
2023    Gerardo Sharif (:  1990) is a 29 y.o. female, here for evaluation of the following chief complaint(s):  Neck Swelling (Swollen lymph nodes in her neck, and under her arms. Started feeling bad end of December. Fever, possible infection in both arms with Tattoos that are new. Worried about MONO) and Wound Infection (Possible infection in new tattoos)        ASSESSMENT/PLAN:    1. Lymphadenopathy  I do not think she has mono. Does not appear sick. Voice is not muffles and throat is clear. LNs are small and likely due to tattoos. May or many not be infected. Will cover with antibiotic to make sure. Monitor lymph nodes. If sore throat returns, will consider monospot or CBC and throat culture   - cephALEXin (KEFLEX) 500 MG capsule; Take 1 capsule by mouth 2 times daily for 7 days  Dispense: 14 capsule; Refill: 0    2. Tattoo reaction  Suspect reaction to red dye. Discouraged from getting red, orange or purple inks IF plans on more tattoos       FOLLOW UP:  Call or return to clinic prn if these symptoms worsen or fail to improve as anticipated. HPI  She has new tattoos. Has had tattos before and never had this reaction. Got 2 separate tattoos on right arm and 2 days later 2 more on the left arm or vice versa. Went to same tattoo parlor. Tats are black and red inks. She recently noted that the arms are more sore and peeling where she got these tats and she now notices that she has swollen lymph  nodes under both arms and in the left neck. She does not have a sore throat now but wonders if she could have mono. Has new boyfriend and every time after they kiss, she has a sore throat. Outpatient Medications Marked as Taking for the 23 encounter (Office Visit) with Ramonia Severance, MD   Medication Sig Dispense Refill    ALPRAZolam (XANAX) 0.5 MG tablet TAKE 1 TABLET BY MOUTH AT ONSET OF PANIC ATTACK AND REPEAT SECOND DOSE IN ONE HOUR IF NOT RESOLVED.   MAXIMUM DAILY DOSE OF 2 PER DAY. THE 20 TABLETS MUST LAST 30 DAYS 20 tablet 0    fluticasone (FLONASE) 50 MCG/ACT nasal spray 1-2 sprays each nostril daily. 16 g 11    Erenumab-aooe (AIMOVIG) 140 MG/ML SOAJ Inject 140 mg into the skin every 30 days 3 mL 2       Review of Systems    OBJECTIVE:    Vitals:    01/18/23 1612   BP: 118/80   Pulse: 76   Temp: 99.3 °F (37.4 °C)   TempSrc: Oral   Height: 5' 8\" (1.727 m)       Physical Exam  Constitutional:       Appearance: Normal appearance. HENT:      Mouth/Throat:      Pharynx: Oropharynx is clear. No pharyngeal swelling, oropharyngeal exudate, posterior oropharyngeal erythema or uvula swelling. Eyes:      General: No scleral icterus. Pulmonary:      Effort: Pulmonary effort is normal. No respiratory distress. Lymphadenopathy:      Cervical: Cervical adenopathy present. Left cervical: Superficial cervical adenopathy present. Upper Body:      Right upper body: Axillary adenopathy present. No pectoral adenopathy. Left upper body: Axillary adenopathy present. No pectoral adenopathy. Comments: Small lymph node in each axilla and 1 in left neck. Skin:     Coloration: Skin is not pale. Comments: New tattoos on both upper arms with some skin reaction -- redness outside of the tattoo ink and slightly peeling over the ink. Neurological:      General: No focal deficit present. Mental Status: She is alert and oriented to person, place, and time. Psychiatric:         Mood and Affect: Mood normal.         Behavior: Behavior normal.         An electronic signature was used to authenticate this note.     Allyson Uribe MD

## 2023-01-22 PROBLEM — R59.1 LYMPHADENOPATHY: Status: ACTIVE | Noted: 2023-01-22

## 2023-01-26 ENCOUNTER — TELEMEDICINE (OUTPATIENT)
Dept: PSYCHOLOGY | Age: 33
End: 2023-01-26
Payer: MEDICAID

## 2023-01-26 DIAGNOSIS — F90.0 ATTENTION DEFICIT HYPERACTIVITY DISORDER (ADHD), PREDOMINANTLY INATTENTIVE TYPE: ICD-10-CM

## 2023-01-26 DIAGNOSIS — F41.0 PANIC DISORDER: ICD-10-CM

## 2023-01-26 DIAGNOSIS — F41.1 GAD (GENERALIZED ANXIETY DISORDER): Primary | ICD-10-CM

## 2023-01-26 PROCEDURE — 90832 PSYTX W PT 30 MINUTES: CPT | Performed by: PSYCHOLOGIST

## 2023-01-26 NOTE — PROGRESS NOTES
TELEHEALTH VISIT -- Audio/Visual (During SWZKC-19 public health emergency)  }  Ellis Mohs was evaluated through a synchronous (real-time) audio-video encounter (via [employer] me). The patient (or guardian, if applicable) is aware that this is a billable service, which includes applicable co-pays. This Virtual Visit was conducted with patient's (and/or legal guardian's) consent. The visit was conducted pursuant to the emergency declaration under the [address] [place] [address], [address] [place] [address] authority and the [employer] Act. Patient identification was verified, and a caregiver was present when appropriate. The patient was located in a state where the provider was licensed to provide care. Conducted a risk-benefit analysis and determined that the patient's presenting problems are consistent with the use of telepsychology. Determined that the patient has sufficient knowledge and skills in the use of technology enabling them to adequately benefit from telepsychology. It was determined that this patient was able to be properly treated without an in-person session. Patient verified that they were currently located at the [facility] address that was provided during registration or another address, if noted below.     Verified the following information:  Patient's identification: Yes  Patient location[place] [address] [place] [address] [facility]    Patient's call back number: [phone]   Patient's emergency contact's name and number, as well as permission to contact them if needed: Extended Emergency Contact Information  Primary Emergency Contact: PastorDorita  Address: [address] [place] [address], [address] [place] [address] Phone: [phone]  Work Phone: [phone]  Relation: Parent  Secondary Emergency Contact: [address] [place] [address] Phone: [phone]  Relation: Parent     Provider location: [facility]  Clement Ortiz, Ph.D.  Psychologist  1/26/2023  2:56 PM      Time spent with Patient: 30 minutes  This is patient's  47th   [facility] appointment. Reason for Consult:    Chief Complaint   Patient presents with    Anxiety       Feedback given to PCP. S:  Pt seen for f/u of anxiety, ADHD, and panic attack. Pt reported poor mood and sxs since friend completed suicide. Aaron Howard had come over the day before, spent the night, and then the next day he went on [employer] and told everyone he was going to kill himself. Pt saw this and attempted to call him 30x. He was clear on Facebook and in other ways that he was doing this to punish his wife for not communicating w him about getting back together. Pt's off and on sexual relationship w him was a secret, so pt feels like she cannot be transparent about her grief. For 4 yrs, they called e/o when they were lonely. \"I was finally getting it together and I was so excited. \" Stated he knows there was nothing she could do, but she continue to analyze for signs and feeling angry. Stated the last month has been a \"blur\" and she has had waves of grief. Reported she cried for the first 15 days wo stopping. Been trying to go to the gym daily for about 30 mins. Hasn't been able to sleep in her bed, sleeping on the couch, when she tries she has nightmares. Focused on grief, support.      O:  MSE:  Appearance    alert, cooperative, tearful  Appetite normal  Sleep disturbance no  Fatigue No  Loss of pleasure No  Impulsive behavior No  Speech    spontaneous, normal rate, normal volume, and well articulated  Mood    depressed  Affect    depressed affect  Thought Content    intact  Thought Process    linear, goal directed, and coherent  Associations    logical connections  Insight    Fair  Judgment    Intact  Orientation    oriented to person, place, time, and general circumstances  Memory    recent and remote memory intact  Attention/Concentration    impaired  Morbid ideation No  Suicide Assessment    no suicidal ideation    History:  Social History:   Social History     Socioeconomic History    Marital status: Single     Spouse name: Not on file    Number of children: 0    Years of education: 12    Highest education level: Not on file   Occupational History    Occupation:  grad     Comment: biology    Occupation: [employer]      Comment: [employer] [place]   Tobacco Use    Smoking status: Former     Packs/day: 0.25     Years: 0.50     Pack years: 0.13     Types: Cigarettes     Start date: 2013     Quit date: 2013     Years since quittin.0    Smokeless tobacco: Never    Tobacco comments:     Denies smoking at this time   Substance and Sexual Activity    Alcohol use: Yes     Comment: h/o weekend binges 5 or more drinks when out 1-2x/week;  :  cut most to all drinking out    Drug use: Yes     Types: Marijuana Linda Pereira)    Sexual activity: Not Currently     Partners: Male     Birth control/protection: Condom, OCP   Other Topics Concern    Not on file   Social History Narrative    Not on file     Social Determinants of Health     Financial Resource Strain: Not on file   Food Insecurity: Not on file   Transportation Needs: Not on file   Physical Activity: Not on file   Stress: Not on file   Social Connections: Not on file   Intimate Partner Violence: Not on file   Housing Stability: Not on file     TOBACCO:   reports that she quit smoking about 9 years ago. Her smoking use included cigarettes. She started smoking about 9 years ago. She has a 0.13 pack-year smoking history. She has never used smokeless tobacco.  ETOH:   reports current alcohol use. Diagnosis:  1. VERA (generalized anxiety disorder)    2. Panic disorder    3.  Attention deficit hyperactivity disorder (ADHD), predominantly inattentive type          Plan:  Pt interventions:  Trained in strategies for increasing balanced thinking, Discussed self-care (sleep, nutrition, rewarding activities, social support, exercise), and Supportive techniques        Documentation was done using voice recognition dragon software. Every effort was made to ensure accuracy; however, inadvertent, unintentional computerized transcription errors may be present.

## 2023-01-27 ENCOUNTER — OFFICE VISIT (OUTPATIENT)
Dept: INTERNAL MEDICINE CLINIC | Age: 33
End: 2023-01-27

## 2023-01-27 VITALS
HEART RATE: 68 BPM | HEIGHT: 68 IN | WEIGHT: 214 LBS | BODY MASS INDEX: 32.43 KG/M2 | DIASTOLIC BLOOD PRESSURE: 64 MMHG | SYSTOLIC BLOOD PRESSURE: 100 MMHG

## 2023-01-27 DIAGNOSIS — F41.0 PANIC DISORDER: ICD-10-CM

## 2023-01-27 DIAGNOSIS — Z79.899 CHRONIC USE OF BENZODIAZEPINE FOR THERAPEUTIC PURPOSE: Primary | ICD-10-CM

## 2023-01-27 DIAGNOSIS — L04.9 ACUTE LYMPHADENITIS: ICD-10-CM

## 2023-01-27 RX ORDER — ALPRAZOLAM 0.5 MG/1
TABLET ORAL
Qty: 20 TABLET | Refills: 2 | Status: SHIPPED | OUTPATIENT
Start: 2023-01-27 | End: 2023-04-27

## 2023-01-27 SDOH — ECONOMIC STABILITY: FOOD INSECURITY: WITHIN THE PAST 12 MONTHS, THE FOOD YOU BOUGHT JUST DIDN'T LAST AND YOU DIDN'T HAVE MONEY TO GET MORE.: NEVER TRUE

## 2023-01-27 SDOH — ECONOMIC STABILITY: FOOD INSECURITY: WITHIN THE PAST 12 MONTHS, YOU WORRIED THAT YOUR FOOD WOULD RUN OUT BEFORE YOU GOT MONEY TO BUY MORE.: NEVER TRUE

## 2023-01-27 ASSESSMENT — SOCIAL DETERMINANTS OF HEALTH (SDOH): HOW HARD IS IT FOR YOU TO PAY FOR THE VERY BASICS LIKE FOOD, HOUSING, MEDICAL CARE, AND HEATING?: NOT HARD AT ALL

## 2023-01-27 NOTE — PROGRESS NOTES
2023    Ellis Mohs (:  1990) is a 29 y.o. female, here for evaluation of the following chief complaint(s):  3 Month Follow-Up      ASSESSMENT/PLAN:    1. Chronic use of benzodiazepine for therapeutic purpose  OARRS profile checked and is valid. 2. Panic disorder  Encouraged to continue to work with therapist and try to minimize use of BZD. She does not tolerate SSRIs well. Encouraged to try propranolol prn   - ALPRAZolam (XANAX) 0.5 MG tablet; TAKE 1 TABLET BY MOUTH AT ONSET OF PANIC ATTACK AND REPEAT SECOND DOSE IN ONE HOUR IF NOT RESOLVED. MAXIMUM DAILY DOSE OF 2 PER DAY. THE 20 TABLETS MUST LAST 30 DAYS  Dispense: 20 tablet; Refill: 2    3. Acute lymphadenitis  Seems to be improving. I am not concerned. It appears related to recent tattoos. Notify office if not resolved in 1 month       FOLLOW UP:  Return in about 12 weeks (around 2023). HPI  Anxiety was good until her friend . He did a face book live video and then committed suicide. She is grieving his death. Grief support group was recommended. She has not pursued this yet   still sees our behavioral therapist Dr. Gale De La Garza. Taking walks helps with her mood but has been too cold and snowy lately to do this. 90 day follow up for controlled substance(s)  alprazolam  OD risk:  not elevated  Lorazepam Equiv dose:  0.64 mg last refill  22  Using medication for:  anxiety gets severe enough to cause a panic attack. Functional improvement noted compared to before taking this controlled medication:  can prevent need to go to ER   Adverse effects:  none     Aberrant behavior: none    She would like me to check the lymph node under the left arm. It got bigger recently but now is starting to go back down. The other nodes resolved. Her skin from the tattoos is now healed and OK        Still unemployed. Hard time finding a job. Had some phone interviews.   Applies for at least  5 jobs  per week and just getting calls back. 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 Some jobs she might like do not pay enough for her to live independently. Outpatient Medications Marked as Taking for the 1/27/23 encounter (Office Visit) with Brandy Wagner MD   Medication Sig Dispense Refill    ALPRAZolam (XANAX) 0.5 MG tablet TAKE 1 TABLET BY MOUTH AT ONSET OF PANIC ATTACK AND REPEAT SECOND DOSE IN ONE HOUR IF NOT RESOLVED. MAXIMUM DAILY DOSE OF 2 PER DAY. THE 20 TABLETS MUST LAST 30 DAYS 20 tablet 0    fluticasone (FLONASE) 50 MCG/ACT nasal spray 1-2 sprays each nostril daily. 16 g 11    Erenumab-aooe (AIMOVIG) 140 MG/ML SOAJ Inject 140 mg into the skin every 30 days 3 mL 2    Riboflavin 400 MG TABS          Review of Systems    OBJECTIVE:    Vitals:    01/27/23 0957   BP: 100/64   Pulse: 68   Weight: 214 lb (97.1 kg)   Height: 5' 8\" (1.727 m)       Physical Exam  Constitutional:       Appearance: Normal appearance. Eyes:      General: No scleral icterus. Pulmonary:      Effort: Pulmonary effort is normal. No respiratory distress. Lymphadenopathy:      Cervical:      Left cervical: No superficial, deep or posterior cervical adenopathy. Upper Body:      Left upper body: Axillary adenopathy present. No pectoral adenopathy. Comments: Small non matted node of about  0.5  x 1 cm    Skin:     Coloration: Skin is not pale. Neurological:      General: No focal deficit present. Mental Status: She is alert and oriented to person, place, and time. Psychiatric:         Attention and Perception: Attention normal.         Mood and Affect: Mood and affect normal.         Behavior: Behavior normal.         An electronic signature was used to authenticate this note.     Manan Samson MD

## 2023-01-28 ASSESSMENT — PATIENT HEALTH QUESTIONNAIRE - PHQ9
1. LITTLE INTEREST OR PLEASURE IN DOING THINGS: 1
4. FEELING TIRED OR HAVING LITTLE ENERGY: 1
3. TROUBLE FALLING OR STAYING ASLEEP: 1
SUM OF ALL RESPONSES TO PHQ QUESTIONS 1-9: 6
5. POOR APPETITE OR OVEREATING: 1
SUM OF ALL RESPONSES TO PHQ QUESTIONS 1-9: 6
10. IF YOU CHECKED OFF ANY PROBLEMS, HOW DIFFICULT HAVE THESE PROBLEMS MADE IT FOR YOU TO DO YOUR WORK, TAKE CARE OF THINGS AT HOME, OR GET ALONG WITH OTHER PEOPLE: 1
SUM OF ALL RESPONSES TO PHQ9 QUESTIONS 1 & 2: 2
7. TROUBLE CONCENTRATING ON THINGS, SUCH AS READING THE NEWSPAPER OR WATCHING TELEVISION: 1
2. FEELING DOWN, DEPRESSED OR HOPELESS: 1
6. FEELING BAD ABOUT YOURSELF - OR THAT YOU ARE A FAILURE OR HAVE LET YOURSELF OR YOUR FAMILY DOWN: 0
9. THOUGHTS THAT YOU WOULD BE BETTER OFF DEAD, OR OF HURTING YOURSELF: 0
8. MOVING OR SPEAKING SO SLOWLY THAT OTHER PEOPLE COULD HAVE NOTICED. OR THE OPPOSITE, BEING SO FIGETY OR RESTLESS THAT YOU HAVE BEEN MOVING AROUND A LOT MORE THAN USUAL: 0

## 2023-02-02 ENCOUNTER — TELEMEDICINE (OUTPATIENT)
Dept: PSYCHOLOGY | Age: 33
End: 2023-02-02
Payer: MEDICAID

## 2023-02-02 DIAGNOSIS — F41.1 GAD (GENERALIZED ANXIETY DISORDER): Primary | ICD-10-CM

## 2023-02-02 DIAGNOSIS — F90.0 ATTENTION DEFICIT HYPERACTIVITY DISORDER (ADHD), PREDOMINANTLY INATTENTIVE TYPE: ICD-10-CM

## 2023-02-02 PROCEDURE — 90832 PSYTX W PT 30 MINUTES: CPT | Performed by: PSYCHOLOGIST

## 2023-02-02 NOTE — PROGRESS NOTES
TELEHEALTH VISIT -- Audio/Visual (During W[place]DXW-09 public health emergency)  }  Esther Zarco was evaluated through a synchronous (real-time) audio-video encounter (via D[employer]. me). The patient (or guardian, if applicable) is aware that this is a billable service, which includes applicable co-pays. This Virtual Visit was conducted with patient's (and/or legal guardian's) consent. The visit was conducted pursuant to the emergency declaration under the [address] [place] [address], [address] [place] [address] authority and the [employer] Act. Patient identification was verified, and a caregiver was present when appropriate. The patient was located in a state where the provider was licensed to provide care. Conducted a risk-benefit analysis and determined that the patient's presenting problems are consistent with the use of telepsychology. Determined that the patient has sufficient knowledge and skills in the use of technology enabling them to adequately benefit from telepsychology. It was determined that this patient was able to be properly treated without an in-person session. Patient verified that they were currently located at the [facility] address that was provided during registration or another address, if noted below. Verified the following information:  Patient's identification: Yes  Patient location: [address] [place], [place] [address] [address]   Patient's call back number: [phone]   Patient's emergency contact's name and number, as well as permission to contact them if needed: No emergency contact information on file. Provider location: [facility]  Ricki Mckinley, Ph.D.  Psychologist  2/2/2023  11:35 AM      Time spent with Patient: 28 minutes  This is patient's  [facility] appointment. Reason for Consult:    Chief Complaint   Patient presents with    Anxiety       Feedback given to PCP.     S:  Pt seen for f/u of depression and anxiety. Pt reported somewhat improved mood and sxs. Monday morning, her friend told pt that she took a whole bottle of pills and wanted to die, so pt called police to do a safety check and now friend is furious. Found out it was only 5 tylenol. Pt is setting reasonable boundaries w her. Since then, she has been to the gym, gotten a massage, has dinner plans tonight. \"It's a new week and a new month. It's going to be different. \" Notably, a childhood friend Ella Celestin) recently moved local and pt is excited about this. He is  and pt is adamant it is a platonic relationship. 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      O:  MSE:  Appearance    alert, cooperative, tearful  Appetite normal  Sleep disturbance no  Fatigue No  Loss of pleasure No  Impulsive behavior No  Speech    spontaneous, normal rate, normal volume, and well articulated  Mood    euthymic  Affect    normal affect  Thought Content    intact  Thought Process    linear, goal directed, and coherent  Associations    logical connections  Insight    Fair  Judgment    Intact  Orientation    oriented to person, place, time, and general circumstances  Memory    recent and remote memory intact  Attention/Concentration    impaired  Morbid ideation No  Suicide Assessment    no suicidal ideation    History:  Social History:   Social History     Socioeconomic History    Marital status: Single     Spouse name: Not on file    Number of children: 0    Years of education: 12    Highest education level: Not on file   Occupational History    Occupation:  [employer]     Comment: biology    Occupation: [employer]      Comment: [employer]   Tobacco Use    Smoking status: Former     Packs/day: 0.25     Years: 0.50     Pack years: 0.13     Types: Cigarettes     Start date: 2013     Quit date: 2013     Years since quittin.1    Smokeless tobacco: Never    Tobacco comments:     Denies smoking at this time   Substance and Sexual Activity    Alcohol use: Yes     Comment: h/o weekend binges 5 or more drinks when out 1-2x/week;  11/14:  cut most to all drinking out    Drug use: Yes     Types: Marijuana (Weed)    Sexual activity: Not Currently     Partners: Male     Birth control/protection: Condom, OCP   Other Topics Concern    Not on file   Social History Narrative    Not on file     Social Determinants of Health     Financial Resource Strain: Low Risk     Difficulty of Paying Living Expenses: Not hard at all   Food Insecurity: No Food Insecurity    Worried About Running Out of Food in the Last Year: Never true    Ran Out of Food in the Last Year: Never true   Transportation Needs: Not on file   Physical Activity: Not on file   Stress: Not on file   Social Connections: Not on file   Intimate Partner Violence: Not on file   Housing Stability: Not on file     TOBACCO:   reports that she quit smoking about 9 years ago. Her smoking use included cigarettes. She started smoking about 9 years ago. She has a 0.13 pack-year smoking history. She has never used smokeless tobacco.  ETOH:   reports current alcohol use.        Diagnosis:  1. VERA (generalized anxiety disorder)    2. Attention deficit hyperactivity disorder (ADHD), predominantly inattentive type          Plan:  Pt interventions:  Trained in strategies for increasing balanced thinking, Discussed and set plan for behavioral activation, and Discussed self-care (sleep, nutrition, rewarding activities, social support, exercise)        Documentation was done using voice recognition dragon software.  Every effort was made to ensure accuracy; however, inadvertent, unintentional computerized transcription errors may be present.

## 2023-02-06 ENCOUNTER — TELEPHONE (OUTPATIENT)
Dept: ORTHOPEDIC SURGERY | Age: 33
End: 2023-02-06

## 2023-02-06 DIAGNOSIS — G43.109 MIGRAINE WITH AURA AND WITHOUT STATUS MIGRAINOSUS, NOT INTRACTABLE: ICD-10-CM

## 2023-02-07 RX ORDER — RIMEGEPANT SULFATE 75 MG/75MG
TABLET, ORALLY DISINTEGRATING ORAL
Qty: 8 TABLET | Refills: 2 | Status: SHIPPED | OUTPATIENT
Start: 2023-02-07

## 2023-02-07 NOTE — TELEPHONE ENCOUNTER
Received a letter for Aimovig 70MG/ML auto-injectors that a PA needs to go through [employer]. Letter attached. Please reach out to patient for NEW PRESCRIPTION coverage. Thank you    If this requires a response please respond to the pool ( P [id number]). Thank you please advise patient.

## 2023-02-08 ENCOUNTER — OFFICE VISIT (OUTPATIENT)
Dept: INTERNAL MEDICINE CLINIC | Age: 33
End: 2023-02-08
Payer: MEDICAID

## 2023-02-08 VITALS
BODY MASS INDEX: 32.54 KG/M2 | TEMPERATURE: 98.8 F | HEIGHT: 68 IN | DIASTOLIC BLOOD PRESSURE: 82 MMHG | SYSTOLIC BLOOD PRESSURE: 110 MMHG | HEART RATE: 80 BPM

## 2023-02-08 DIAGNOSIS — J02.9 SORE THROAT: Primary | ICD-10-CM

## 2023-02-08 DIAGNOSIS — J34.89 NASAL VESTIBULITIS: ICD-10-CM

## 2023-02-08 LAB — S PYO AG THROAT QL: NORMAL

## 2023-02-08 PROCEDURE — 86308 HETEROPHILE ANTIBODY SCREEN: CPT | Performed by: FAMILY MEDICINE

## 2023-02-08 PROCEDURE — 99213 OFFICE O/P EST LOW 20 MIN: CPT | Performed by: FAMILY MEDICINE

## 2023-02-08 PROCEDURE — 87880 STREP A ASSAY W/OPTIC: CPT | Performed by: FAMILY MEDICINE

## 2023-02-08 NOTE — PROGRESS NOTES
2023    Helena Speaker (:  1990) is a 29 y.o. female, here for evaluation of the following chief complaint(s):  Pharyngitis        ASSESSMENT/PLAN:    1. Sore throat  Rapid Strep and Mono are negative. - POCT rapid strep A  - POCT Infectious mononucleosis Abs (mono)  - Culture, Throat    Symptomatic treatment for now. May need to treat again for GERD. 2. Nasal vestibulitis  Discussed. Do not push up in nose too high. Just cover the pimple like bumps at the right nares opening.  - mupirocin (BACTROBAN) 2 % ointment; Apply topically 2-3 times daily for 10 days  Dispense: 22 g; Refill: 0        FOLLOW UP:  Call or return to clinic prn if these symptoms worsen or fail to improve as anticipated. HPI    Keeps getting sore throats. Normally happens after deep kissing a maurilio. This is a different maurilio  She is concerned about getting Strep throat or mono. Last oral sex was in November. She did have a sore throat after that but it went away. Keeps getting sore throats that last about a week and then go away for a while. Outpatient Medications Marked as Taking for the 23 encounter (Office Visit) with Kim Danile MD   Medication Sig Dispense Refill    fluticasone (FLONASE) 50 MCG/ACT nasal spray 1-2 sprays each nostril daily. 16 g 11    Erenumab-aooe (AIMOVIG) 140 MG/ML SOAJ Inject 140 mg into the skin every 30 days 3 mL 2    Riboflavin 400 MG TABS          Review of Systems    OBJECTIVE:    Vitals:    23 1603   BP: 110/82   Pulse: 80   Temp: 98.8 °F (37.1 °C)   TempSrc: Oral   Height: 5' 8\" (1.727 m)       Physical Exam  Vitals reviewed. Constitutional:       General: She is not in acute distress. Appearance: She is well-developed. She is not diaphoretic. HENT:      Head:      Jaw: No trismus or swelling. Salivary Glands: Right salivary gland is not diffusely enlarged or tender. Left salivary gland is not diffusely enlarged or tender.       Nose: Nasal tenderness (right distal nares with small pink bumps) present. No mucosal edema, congestion or rhinorrhea. Right Sinus: No maxillary sinus tenderness or frontal sinus tenderness. Left Sinus: No maxillary sinus tenderness or frontal sinus tenderness. Mouth/Throat:      Pharynx: Uvula midline. Posterior oropharyngeal erythema (very mild erythema of recurrent tonsil tissue laterally only) present. No oropharyngeal exudate or uvula swelling. Tonsils: No tonsillar exudate or tonsillar abscesses. Comments: H/o tonsillectomy. Regrew small amount of tonsil or lymph tissue. Eyes:      General:         Right eye: No discharge. Left eye: No discharge. Conjunctiva/sclera: Conjunctivae normal.      Right eye: Right conjunctiva is not injected. Left eye: Left conjunctiva is not injected. Neck:      Thyroid: No thyromegaly. Trachea: Phonation normal.   Cardiovascular:      Rate and Rhythm: Normal rate and regular rhythm. Pulmonary:      Breath sounds: No stridor. Musculoskeletal:      Cervical back: Neck supple. Lymphadenopathy:      Cervical: No cervical adenopathy. Right cervical: No superficial or deep cervical adenopathy. Left cervical: No superficial or deep cervical adenopathy. Skin:     General: Skin is warm and dry. Nails: There is no clubbing. An electronic signature was used to authenticate this note.     Deya Crook MD

## 2023-02-08 NOTE — PATIENT INSTRUCTIONS
Mononucleosis test is negative. Rapid Strep is negative. Culture will take 2-3 days to get back. In the mean time gargle with warm salt water and you can drink Throat Coat Tea    If the culture is negative, I would like to get you back on stomach acid medication because it can cause sore throats and even white spots especially in the morning.     Avoiding eating too late and avoid spicy or fatty foods and caffeine after 4 PM.

## 2023-02-11 LAB
ORGANISM: ABNORMAL
THROAT CULTURE: ABNORMAL
THROAT CULTURE: ABNORMAL

## 2023-02-12 ENCOUNTER — TELEPHONE (OUTPATIENT)
Dept: INTERNAL MEDICINE CLINIC | Age: 33
End: 2023-02-12

## 2023-02-12 RX ORDER — AMOXICILLIN 875 MG/1
875 TABLET, COATED ORAL 2 TIMES DAILY
Qty: 20 TABLET | Refills: 0 | Status: SHIPPED | OUTPATIENT
Start: 2023-02-12 | End: 2023-02-22

## 2023-02-12 NOTE — TELEPHONE ENCOUNTER
You have a Strep Bacteria that is not the one that causes Rheumatic fever. It is a milder group but still will clear with antibiotics. It does not show up on the rapid testing. I will send in Amoxicillin to [facility] on [facility].

## 2023-02-13 ENCOUNTER — TELEMEDICINE (OUTPATIENT)
Dept: PSYCHOLOGY | Age: 33
End: 2023-02-13

## 2023-02-13 DIAGNOSIS — F41.0 PANIC DISORDER: Primary | ICD-10-CM

## 2023-02-13 DIAGNOSIS — F90.0 ATTENTION DEFICIT HYPERACTIVITY DISORDER (ADHD), PREDOMINANTLY INATTENTIVE TYPE: ICD-10-CM

## 2023-02-13 DIAGNOSIS — F41.1 GAD (GENERALIZED ANXIETY DISORDER): ICD-10-CM

## 2023-02-13 PROCEDURE — 90832 PSYTX W PT 30 MINUTES: CPT | Performed by: PSYCHOLOGIST
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
## 2023-02-15 NOTE — TELEPHONE ENCOUNTER
Received a letter from [employer] stating that patient already has an APPROVAL for Aimovig 140MG/ML auto-injectors that is valid through 12/08/23, Authorization ID: [id number]. Letter attached.

## 2023-03-01 ENCOUNTER — TELEMEDICINE (OUTPATIENT)
Dept: PSYCHOLOGY | Age: 33
End: 2023-03-01
Payer: MEDICAID

## 2023-03-01 DIAGNOSIS — F90.0 ATTENTION DEFICIT HYPERACTIVITY DISORDER (ADHD), PREDOMINANTLY INATTENTIVE TYPE: Primary | ICD-10-CM

## 2023-03-01 DIAGNOSIS — F41.1 GAD (GENERALIZED ANXIETY DISORDER): ICD-10-CM

## 2023-03-01 DIAGNOSIS — F41.0 PANIC DISORDER: ICD-10-CM

## 2023-03-01 PROCEDURE — 90832 PSYTX W PT 30 MINUTES: CPT | Performed by: PSYCHOLOGIST

## 2023-03-01 NOTE — PROGRESS NOTES
TELEHEALTH VISIT -- Audio/Visual (During YEITY-48 public health emergency)  }  Tamiko Farfan was evaluated through a synchronous (real-time) audio-video encounter (via Doxy. me). The patient (or guardian, if applicable) is aware that this is a billable service, which includes applicable co-pays. This Virtual Visit was conducted with patient's (and/or legal guardian's) consent. The visit was conducted pursuant to the emergency declaration under the [address] [place] [address], [address] [place] [address] authority and the [employer] and [employer] Act. Patient identification was verified, and a caregiver was present when appropriate. The patient was located in a state where the provider was licensed to provide care. Conducted a risk-benefit analysis and determined that the patient's presenting problems are consistent with the use of telepsychology. Determined that the patient has sufficient knowledge and skills in the use of technology enabling them to adequately benefit from telepsychology. It was determined that this patient was able to be properly treated without an in-person session. Patient verified that they were currently located at the [facility] address that was provided during registration or another address, if noted below. Verified the following information:  Patient's identification: Yes  Patient location: [address] [address]   Patient's call back number: [phone]   Patient's emergency contact's name and number, as well as permission to contact them if needed: No emergency contact information on file. Provider location: [facility]  Penny Rubalcava, Ph.D.  Psychologist  3/1/2023  10:34 AM      Time spent with Patient: 25 minutes  This is patient's  50th   [facility] appointment. Reason for Consult:    Chief Complaint   Patient presents with    Anxiety       Feedback given to PCP.     S:  Pt seen

## 2023-03-16 ENCOUNTER — TELEMEDICINE (OUTPATIENT)
Dept: PSYCHOLOGY | Age: 33
End: 2023-03-16
Payer: MEDICAID

## 2023-03-16 DIAGNOSIS — F41.0 PANIC DISORDER: ICD-10-CM

## 2023-03-16 DIAGNOSIS — F90.0 ATTENTION DEFICIT HYPERACTIVITY DISORDER (ADHD), PREDOMINANTLY INATTENTIVE TYPE: ICD-10-CM

## 2023-03-16 DIAGNOSIS — F41.1 GAD (GENERALIZED ANXIETY DISORDER): Primary | ICD-10-CM

## 2023-03-16 PROCEDURE — 90832 PSYTX W PT 30 MINUTES: CPT | Performed by: PSYCHOLOGIST

## 2023-03-21 DIAGNOSIS — G43.109 MIGRAINE WITH AURA AND WITHOUT STATUS MIGRAINOSUS, NOT INTRACTABLE: ICD-10-CM

## 2023-03-21 RX ORDER — ERENUMAB-AOOE 140 MG/ML
140 INJECTION, SOLUTION SUBCUTANEOUS
Qty: 1 ML | Refills: 5 | Status: SHIPPED | OUTPATIENT
Start: 2023-03-21

## 2023-03-21 RX ORDER — ERENUMAB-AOOE 140 MG/ML
140 INJECTION, SOLUTION SUBCUTANEOUS
Qty: 3 ML | Refills: 2 | OUTPATIENT
Start: 2023-03-21

## 2023-03-21 RX ORDER — RIMEGEPANT SULFATE 75 MG/75MG
TABLET, ORALLY DISINTEGRATING ORAL
Qty: 8 TABLET | Refills: 2 | Status: SHIPPED | OUTPATIENT
Start: 2023-03-21

## 2023-03-21 NOTE — TELEPHONE ENCOUNTER
Future Appointments    Encounter Information    Provider Department Appt Notes   4/21/2023 Winsome Ulrich MD [facility] Internal Medicine Return in about 12 weeks (around 4/21/2023).      Past Visits    Date Provider Specialty Visit Type Primary Dx   02/08/2023 Winsome Ulrich MD Internal Medicine Office Visit Sore throat

## 2023-04-04 DIAGNOSIS — G43.109 MIGRAINE WITH AURA AND WITHOUT STATUS MIGRAINOSUS, NOT INTRACTABLE: ICD-10-CM

## 2023-04-04 RX ORDER — SUMATRIPTAN 100 MG/1
TABLET, FILM COATED ORAL
Qty: 9 TABLET | Refills: 2 | Status: SHIPPED | OUTPATIENT
Start: 2023-04-04

## 2023-04-04 NOTE — TELEPHONE ENCOUNTER
Future Appointments    Encounter Information    Provider Department Appt Notes   4/21/2023 Alexandra Baker MD [facility] Internal Medicine Return in about 12 weeks (around 4/21/2023).      Past Visits    Date Provider Specialty Visit Type Primary Dx   02/08/2023 Alexandra Baker MD Internal Medicine Office Visit Sore throat

## 2023-04-17 ENCOUNTER — TELEPHONE (OUTPATIENT)
Dept: FAMILY MEDICINE CLINIC | Age: 33
End: 2023-04-17

## 2023-04-20 NOTE — TELEPHONE ENCOUNTER
The medication is APPROVED.  4/17/2023- 4/15/2024. If this requires a response please respond to the pool ( P [facility] [address]). Thank you please advise patient.

## 2023-04-21 ENCOUNTER — OFFICE VISIT (OUTPATIENT)
Dept: INTERNAL MEDICINE CLINIC | Age: 33
End: 2023-04-21

## 2023-04-21 VITALS
HEART RATE: 75 BPM | HEIGHT: 68 IN | SYSTOLIC BLOOD PRESSURE: 108 MMHG | BODY MASS INDEX: 32.98 KG/M2 | DIASTOLIC BLOOD PRESSURE: 70 MMHG | OXYGEN SATURATION: 98 % | WEIGHT: 217.6 LBS

## 2023-04-21 DIAGNOSIS — F41.0 PANIC DISORDER: ICD-10-CM

## 2023-04-21 DIAGNOSIS — F90.0 ATTENTION DEFICIT HYPERACTIVITY DISORDER (ADHD), PREDOMINANTLY INATTENTIVE TYPE: Primary | ICD-10-CM

## 2023-04-21 DIAGNOSIS — Z00.00 PREVENTATIVE HEALTH CARE: ICD-10-CM

## 2023-04-21 DIAGNOSIS — Z51.81 MEDICATION MONITORING ENCOUNTER: ICD-10-CM

## 2023-04-21 RX ORDER — DEXTROAMPHETAMINE SACCHARATE, AMPHETAMINE ASPARTATE, DEXTROAMPHETAMINE SULFATE AND AMPHETAMINE SULFATE 1.25; 1.25; 1.25; 1.25 MG/1; MG/1; MG/1; MG/1
5 TABLET ORAL DAILY PRN
Qty: 30 TABLET | Refills: 0 | Status: SHIPPED | OUTPATIENT
Start: 2023-04-21 | End: 2023-05-21

## 2023-04-21 RX ORDER — TRETINOIN 0.5 MG/G
CREAM TOPICAL
Qty: 20 G | Refills: 5 | Status: SHIPPED | OUTPATIENT
Start: 2023-04-21 | End: 2023-05-21

## 2023-04-21 RX ORDER — ALPRAZOLAM 0.5 MG/1
.5-1 TABLET ORAL DAILY PRN
Qty: 20 TABLET | Refills: 2 | Status: SHIPPED | OUTPATIENT
Start: 2023-04-21 | End: 2023-07-20

## 2023-04-21 SDOH — ECONOMIC STABILITY: FOOD INSECURITY: WITHIN THE PAST 12 MONTHS, THE FOOD YOU BOUGHT JUST DIDN'T LAST AND YOU DIDN'T HAVE MONEY TO GET MORE.: NEVER TRUE

## 2023-04-21 SDOH — ECONOMIC STABILITY: FOOD INSECURITY: WITHIN THE PAST 12 MONTHS, YOU WORRIED THAT YOUR FOOD WOULD RUN OUT BEFORE YOU GOT MONEY TO BUY MORE.: NEVER TRUE

## 2023-04-21 SDOH — ECONOMIC STABILITY: INCOME INSECURITY: HOW HARD IS IT FOR YOU TO PAY FOR THE VERY BASICS LIKE FOOD, HOUSING, MEDICAL CARE, AND HEATING?: NOT HARD AT ALL

## 2023-04-21 SDOH — ECONOMIC STABILITY: HOUSING INSECURITY
IN THE LAST 12 MONTHS, WAS THERE A TIME WHEN YOU DID NOT HAVE A STEADY PLACE TO SLEEP OR SLEPT IN A SHELTER (INCLUDING NOW)?: NO

## 2023-04-21 NOTE — PROGRESS NOTES
2023    Rito Styles (:  1990) is a 29 y.o. female, here for evaluation of the following chief complaint(s):  3 Month Follow-Up        ASSESSMENT/PLAN:    1. Panic disorder  Stable. Maybe a bit better with more to distract her attention   - ALPRAZolam (XANAX) 0.5 MG tablet; Take 1-2 tablets by mouth daily as needed (panic attack) for up to 90 days. 20 pills to last at least 30 days;  all refills for 90 days. Max Daily Amount: 1 mg  Dispense: 20 tablet; Refill: 2  - TSH with Reflex; Future    OARRS profile checked and is valid. 2. Attention deficit hyperactivity disorder (ADHD), predominantly inattentive type  She normally has headaches with stimulants so not using often but finds she may fall asleep of fight sleep during meetings which are required for work. Discussed will try very small dose of quick release amphetamine and hopefully it will not trigger headache.  - amphetamine-dextroamphetamine (ADDERALL, 5MG,) 5 MG tablet; Take 1 tablet by mouth daily as needed (need to focus on work or a meeting) for up to 30 days. Max Daily Amount: 5 mg  Dispense: 30 tablet; Refill: 0    Controlled Substance Monitoring:    Acute and Chronic Pain Monitoring:   RX Monitoring 2023   Attestation -   Periodic Controlled Substance Monitoring Assessed functional status. ;No signs of potential drug abuse or diversion identified. 3. Preventative health care  Labs needed for medication but will also get preventative labs   - CBC with Auto Differential; Future  - Comprehensive Metabolic Panel; Future  - Lipid, Fasting; Future  - TSH with Reflex; Future    4. Medication monitoring encounter  - CBC with Auto Differential; Future  - Comprehensive Metabolic Panel; Future        FOLLOW UP:  Return in about 12 weeks (around 2023). Xanax and Adderall refills. HPI    New job. Daytime job.   A little trouble with getting on the dayshift schedule but has been there for a month and has not

## 2023-04-21 NOTE — PATIENT INSTRUCTIONS
Neurvivio device for migraines. Tretinoin. Let me know if you need it sent to other pharmacy with [facility].

## 2023-05-08 ENCOUNTER — TELEPHONE (OUTPATIENT)
Dept: FAMILY MEDICINE CLINIC | Age: 33
End: 2023-05-08

## 2023-05-08 NOTE — TELEPHONE ENCOUNTER
Received a Submitted PA for Tretinoin  Via CMM Key: [id number] STATUS: NOT SENT    Please provide a Diagnoses Code for this medication, if a PA is still needed. If this requires a response please respond to the pool ( P [facility] [address] [facility]). Thank you.

## 2023-05-15 ENCOUNTER — TELEMEDICINE (OUTPATIENT)
Dept: PSYCHOLOGY | Age: 33
End: 2023-05-15
Payer: MEDICAID

## 2023-05-15 DIAGNOSIS — F90.0 ATTENTION DEFICIT HYPERACTIVITY DISORDER (ADHD), PREDOMINANTLY INATTENTIVE TYPE: ICD-10-CM

## 2023-05-15 DIAGNOSIS — F41.1 GAD (GENERALIZED ANXIETY DISORDER): Primary | ICD-10-CM

## 2023-05-15 PROCEDURE — 90832 PSYTX W PT 30 MINUTES: CPT | Performed by: PSYCHOLOGIST

## 2023-06-05 ENCOUNTER — TELEMEDICINE (OUTPATIENT)
Dept: PSYCHOLOGY | Age: 33
End: 2023-06-05
Payer: MEDICAID

## 2023-06-05 DIAGNOSIS — F90.0 ATTENTION DEFICIT HYPERACTIVITY DISORDER (ADHD), PREDOMINANTLY INATTENTIVE TYPE: ICD-10-CM

## 2023-06-05 DIAGNOSIS — F41.0 PANIC DISORDER: Primary | ICD-10-CM

## 2023-06-05 DIAGNOSIS — F41.1 GAD (GENERALIZED ANXIETY DISORDER): ICD-10-CM

## 2023-06-05 DIAGNOSIS — F33.1 MODERATE EPISODE OF RECURRENT MAJOR DEPRESSIVE DISORDER (HCC): ICD-10-CM

## 2023-06-05 PROCEDURE — 90832 PSYTX W PT 30 MINUTES: CPT | Performed by: PSYCHOLOGIST

## 2023-06-06 ENCOUNTER — PATIENT MESSAGE (OUTPATIENT)
Dept: INTERNAL MEDICINE CLINIC | Age: 33
End: 2023-06-06

## 2023-06-06 DIAGNOSIS — F41.1 GAD (GENERALIZED ANXIETY DISORDER): ICD-10-CM

## 2023-06-06 DIAGNOSIS — F41.0 PANIC DISORDER: ICD-10-CM

## 2023-06-07 DIAGNOSIS — F41.0 PANIC DISORDER: ICD-10-CM

## 2023-06-07 RX ORDER — BUSPIRONE HYDROCHLORIDE 5 MG/1
5 TABLET ORAL 2 TIMES DAILY
Qty: 60 TABLET | Refills: 1 | Status: SHIPPED | OUTPATIENT
Start: 2023-06-07

## 2023-06-07 RX ORDER — PROPRANOLOL HYDROCHLORIDE 10 MG/1
TABLET ORAL
Qty: 30 TABLET | Refills: 2 | Status: SHIPPED | OUTPATIENT
Start: 2023-06-07

## 2023-06-07 NOTE — TELEPHONE ENCOUNTER
Efrain Peters MA 6/7/2023 8:11 AM EDT      ----- Message -----  From: Kyle Tee  Sent: 6/6/2023 10:52 PM EDT  To: , *  Subject: Anxiety/buspirone     Hi you may have seen my message about my increased anxiety to Dr Michael Friedman. Im wondering if you can send in the buspirone for me to try again in the morning.  I think it was removed from my med list. Thank you!!!

## 2023-06-20 ENCOUNTER — TELEMEDICINE (OUTPATIENT)
Dept: PSYCHOLOGY | Age: 33
End: 2023-06-20
Payer: MEDICAID

## 2023-06-20 DIAGNOSIS — F33.1 MODERATE EPISODE OF RECURRENT MAJOR DEPRESSIVE DISORDER (HCC): Primary | ICD-10-CM

## 2023-06-20 DIAGNOSIS — F41.1 GAD (GENERALIZED ANXIETY DISORDER): ICD-10-CM

## 2023-06-20 DIAGNOSIS — F90.0 ATTENTION DEFICIT HYPERACTIVITY DISORDER (ADHD), PREDOMINANTLY INATTENTIVE TYPE: ICD-10-CM

## 2023-06-20 PROCEDURE — 90832 PSYTX W PT 30 MINUTES: CPT | Performed by: PSYCHOLOGIST

## 2023-06-26 ENCOUNTER — TELEPHONE (OUTPATIENT)
Dept: ADMINISTRATIVE | Age: 33
End: 2023-06-26

## 2023-06-26 NOTE — TELEPHONE ENCOUNTER
Submitted PA for Nurtec 75MG dispersible tablets  Via CMM Key: [id number] STATUS: This request cannot be processed due to the member's coverage has terminated. Resubmit using active member ID information. Please reach out to patient for NEW PRESCRIPTION coverage.      Thank you

## 2023-07-06 ENCOUNTER — TELEMEDICINE (OUTPATIENT)
Dept: PSYCHOLOGY | Age: 33
End: 2023-07-06

## 2023-07-06 DIAGNOSIS — F33.1 MODERATE EPISODE OF RECURRENT MAJOR DEPRESSIVE DISORDER (HCC): Primary | ICD-10-CM

## 2023-07-06 DIAGNOSIS — F90.0 ATTENTION DEFICIT HYPERACTIVITY DISORDER (ADHD), PREDOMINANTLY INATTENTIVE TYPE: ICD-10-CM

## 2023-07-06 DIAGNOSIS — F41.1 GAD (GENERALIZED ANXIETY DISORDER): ICD-10-CM

## 2023-07-07 ENCOUNTER — TELEPHONE (OUTPATIENT)
Dept: INTERNAL MEDICINE CLINIC | Age: 33
End: 2023-07-07

## 2023-07-07 NOTE — TELEPHONE ENCOUNTER
Paperwork for a refill of medication was on my desk and Lazarus Shoe, LPN called the patient who stated refill is not due until the 20th. Will hold for PCP. Routing to PCP.

## 2023-07-07 NOTE — TELEPHONE ENCOUNTER
This Nurse called pt to clarify when her next dose of Amovig is due.  Pt states her next injection is due on 7/20/2023

## 2023-07-08 RX ORDER — ERENUMAB-AOOE 140 MG/ML
140 INJECTION, SOLUTION SUBCUTANEOUS
Qty: 1 ML | Refills: 5 | Status: SHIPPED | OUTPATIENT
Start: 2023-07-08

## 2023-07-17 ENCOUNTER — TELEMEDICINE (OUTPATIENT)
Dept: INTERNAL MEDICINE CLINIC | Age: 33
End: 2023-07-17
Payer: MEDICAID

## 2023-07-17 DIAGNOSIS — G43.109 MIGRAINE WITH AURA AND WITHOUT STATUS MIGRAINOSUS, NOT INTRACTABLE: ICD-10-CM

## 2023-07-17 DIAGNOSIS — Z79.899 CHRONIC USE OF BENZODIAZEPINE FOR THERAPEUTIC PURPOSE: Primary | ICD-10-CM

## 2023-07-17 DIAGNOSIS — F90.0 ATTENTION DEFICIT HYPERACTIVITY DISORDER (ADHD), PREDOMINANTLY INATTENTIVE TYPE: ICD-10-CM

## 2023-07-17 DIAGNOSIS — F41.0 PANIC DISORDER: ICD-10-CM

## 2023-07-17 PROCEDURE — 99213 OFFICE O/P EST LOW 20 MIN: CPT | Performed by: FAMILY MEDICINE

## 2023-07-17 RX ORDER — ALPRAZOLAM 0.5 MG/1
.5-1 TABLET ORAL DAILY PRN
Qty: 20 TABLET | Refills: 1 | Status: SHIPPED | OUTPATIENT
Start: 2023-08-10 | End: 2023-10-09

## 2023-07-17 RX ORDER — DEXTROAMPHETAMINE SACCHARATE, AMPHETAMINE ASPARTATE, DEXTROAMPHETAMINE SULFATE AND AMPHETAMINE SULFATE 1.25; 1.25; 1.25; 1.25 MG/1; MG/1; MG/1; MG/1
5 TABLET ORAL DAILY PRN
Qty: 30 TABLET | Refills: 0 | Status: SHIPPED | OUTPATIENT
Start: 2023-07-17 | End: 2023-08-16

## 2023-07-17 NOTE — PROGRESS NOTES
Héctor Vivas (:  1990) is a Established patient, presenting virtually for evaluation of the following:  Chief Complaint   Patient presents with    Medication Check         Assessment & Plan   Below is the assessment and plan developed based on review of pertinent history, physical exam, labs, studies, and medications. 1. Chronic use of benzodiazepine for therapeutic purpose  OARRS profile checked and is valid. 2. Panic disorder  Stable. Encouraged to continue therapy   - ALPRAZolam (XANAX) 0.5 MG tablet; Take 1-2 tablets by mouth daily as needed (panic attack) for up to 60 days. 20 pills to last at least 30 days;  all refills for 90 days. Max Daily Amount: 1 mg  Dispense: 20 tablet; Refill: 1    3. Attention deficit hyperactivity disorder (ADHD), predominantly inattentive type  Using very little ADD medication. - amphetamine-dextroamphetamine (ADDERALL, 5MG,) 5 MG tablet; Take 1 tablet by mouth daily as needed (need to focus on work or a meeting) for up to 30 days. Max Daily Amount: 5 mg  Dispense: 30 tablet; Refill: 0    4. Migraine with aura and without status migrainosus, not intractable  Doing well with Aimovig. Works great. Return in about 12 weeks (around 10/9/2023). Subjective   HPI    FU of Xanax for anxiety. Other than being on call stress from work issues, she is doing OK. She used it more frequently last month due to work issues but doing better and did not exceed her Rx quantity. LME 90 day average 0.71 mg. With 30 day average of 0.96. LR 23    Adderall. Using less since getting used to working days. LR of 30 pills on 23 and has a few pills left    Migraines:  Jesus Cordon is a \"miracle. \"  Working great. Able to be out in the sun and able to exercise without headaches now. Exercising daily for 4 months and romie also helps her stress.      Outpatient Medications Marked as Taking for the 23 encounter (Telemedicine) with Marcial Renee

## 2023-07-26 ENCOUNTER — TELEPHONE (OUTPATIENT)
Dept: ADMINISTRATIVE | Age: 33
End: 2023-07-26

## 2023-07-26 NOTE — TELEPHONE ENCOUNTER
Submitted PA for [facility]  Via [facility] Key: [id number]  STATUS: PENDING. Follow up done daily; if no response in three days we will refax for status check. If another three days goes by with no response we will call the insurance for status.

## 2023-07-27 ENCOUNTER — TELEMEDICINE (OUTPATIENT)
Dept: PSYCHOLOGY | Age: 33
End: 2023-07-27
Payer: MEDICAID

## 2023-07-27 DIAGNOSIS — F33.1 MODERATE EPISODE OF RECURRENT MAJOR DEPRESSIVE DISORDER (HCC): Primary | ICD-10-CM

## 2023-07-27 DIAGNOSIS — F41.1 GAD (GENERALIZED ANXIETY DISORDER): ICD-10-CM

## 2023-07-27 DIAGNOSIS — F90.0 ATTENTION DEFICIT HYPERACTIVITY DISORDER (ADHD), PREDOMINANTLY INATTENTIVE TYPE: ICD-10-CM

## 2023-07-27 PROCEDURE — 90832 PSYTX W PT 30 MINUTES: CPT | Performed by: PSYCHOLOGIST
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
## 2023-08-17 ENCOUNTER — TELEMEDICINE (OUTPATIENT)
Dept: PSYCHOLOGY | Age: 33
End: 2023-08-17

## 2023-08-17 DIAGNOSIS — F33.1 MODERATE EPISODE OF RECURRENT MAJOR DEPRESSIVE DISORDER (HCC): Primary | ICD-10-CM

## 2023-08-30 ENCOUNTER — PATIENT MESSAGE (OUTPATIENT)
Dept: INTERNAL MEDICINE CLINIC | Age: 33
End: 2023-08-30

## 2023-08-31 ENCOUNTER — TELEMEDICINE (OUTPATIENT)
Dept: PSYCHOLOGY | Age: 33
End: 2023-08-31
Payer: MEDICAID

## 2023-08-31 DIAGNOSIS — F41.1 GAD (GENERALIZED ANXIETY DISORDER): ICD-10-CM

## 2023-08-31 DIAGNOSIS — F90.0 ATTENTION DEFICIT HYPERACTIVITY DISORDER (ADHD), PREDOMINANTLY INATTENTIVE TYPE: ICD-10-CM

## 2023-08-31 DIAGNOSIS — F33.1 MODERATE EPISODE OF RECURRENT MAJOR DEPRESSIVE DISORDER (HCC): Primary | ICD-10-CM

## 2023-08-31 PROCEDURE — 90832 PSYTX W PT 30 MINUTES: CPT | Performed by: PSYCHOLOGIST

## 2023-08-31 NOTE — PROGRESS NOTES
Occupation:  at [employer]   Tobacco Use    Smoking status: Former     Packs/day: 0.25     Years: 0.50     Pack years: 0.13     Types: Cigarettes     Start date: 2013     Quit date: 2013     Years since quittin.6    Smokeless tobacco: Never    Tobacco comments:     Denies smoking at this time   Substance and Sexual Activity    Alcohol use: Yes     Comment: h/o weekend binges 5 or more drinks when out 1-2x/week;  :  cut most to all drinking out    Drug use: Yes     Types: Marijuana Cee Sella)    Sexual activity: Not Currently     Partners: Male     Birth control/protection: Condom, OCP   Other Topics Concern    Not on file   Social History Narrative    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     Social Determinants of Health     Financial Resource Strain: Low Risk     Difficulty of Paying Living Expenses: Not hard at all   Food Insecurity: No Food Insecurity    Worried About [place] in the Last Year: Never true    [address] in the Last Year: Never true   Transportation Needs: Unknown    Lack of Transportation (Medical): Not on file    Lack of Transportation (Non-Medical): No   Physical Activity: Not on file   Stress: Not on file   Social Connections: Not on file   Intimate Partner Violence: Not on file   Housing Stability: Unknown    Unable to Pay for Housing in the Last Year: Not on file    Number of Places Lived in the Last Year: Not on file    Unstable Housing in the Last Year: No     TOBACCO:   reports that she quit smoking about 9 years ago. Her smoking use included cigarettes. She started smoking about 9 years ago. She has a 0.13 pack-year smoking history. She has never used smokeless tobacco.  ETOH:   reports current alcohol use. Diagnosis:  1. Moderate episode of recurrent major depressive disorder ([address])    2. VERA (generalized anxiety disorder)    3.  Attention deficit hyperactivity disorder (ADHD), predominantly inattentive

## 2023-09-22 ENCOUNTER — TELEMEDICINE (OUTPATIENT)
Dept: INTERNAL MEDICINE CLINIC | Age: 33
End: 2023-09-22

## 2023-09-22 DIAGNOSIS — Z51.81 ENCOUNTER FOR MONITORING STIMULANT THERAPY: ICD-10-CM

## 2023-09-22 DIAGNOSIS — Z79.899 ENCOUNTER FOR MONITORING STIMULANT THERAPY: ICD-10-CM

## 2023-09-22 DIAGNOSIS — Z79.899 CHRONIC USE OF BENZODIAZEPINE FOR THERAPEUTIC PURPOSE: Primary | ICD-10-CM

## 2023-09-22 DIAGNOSIS — F41.0 PANIC DISORDER: ICD-10-CM

## 2023-09-22 RX ORDER — ALPRAZOLAM 0.5 MG/1
.5-1 TABLET ORAL DAILY PRN
Qty: 20 TABLET | Refills: 1 | Status: SHIPPED | OUTPATIENT
Start: 2023-10-20 | End: 2023-12-19

## 2023-09-22 NOTE — PROGRESS NOTES
Duarte Pizano, was evaluated through a synchronous (real-time) audio-video encounter. The patient (or guardian if applicable) is aware that this is a billable service, which includes applicable co-pays. This Virtual Visit was conducted with patient's (and/or legal guardian's) consent. Patient identification was verified, and a caregiver was present when appropriate. The patient was located at [facility]: [address] [place] [address]   her work location. Provider was located at [facility] (Appt Dept): [address] [address]      Duarte Pizano (:  1990) is a Established patient, presenting virtually for evaluation of the following:    Assessment & Plan   Below is the assessment and plan developed based on review of pertinent history, physical exam, labs, studies, and medications. 1. Chronic use of benzodiazepine for therapeutic purpose  OARRS profile checked and is valid. 2. Panic disorder  Prn use. Continue therapy   - ALPRAZolam (XANAX) 0.5 MG tablet; Take 1-2 tablets by mouth daily as needed (panic attack) for up to 60 days. 20 pills to last at least 30 days;  all refills for 90 days. Max Daily Amount: 1 mg  Dispense: 20 tablet; Refill: 1    3. Encounter for monitoring stimulant therapy  Infrequent use. Does not need refill yet. Return in about 12 weeks (around 12/15/2023) for in person check up. .         Subjective   HPI    90 day follow up for controlled substance(s)    as needed Xanax for panic attacks and Adderall infrequent use for ADD. OD risk: Not elevated  Using medication for: Anxiety/panic disorder: ADD. Functional improvement noted compared to before taking this controlled medication: Able to get a panic attack under control which also can help prevent a serious migraine. Able to focus and concentrate on meetings and detailed information as needed with the as needed Adderall. Adverse effects: No adverse effects from Xanax.   Does get

## 2023-09-26 ENCOUNTER — PATIENT MESSAGE (OUTPATIENT)
Dept: INTERNAL MEDICINE CLINIC | Age: 33
End: 2023-09-26

## 2023-09-26 RX ORDER — CLINDAMYCIN AND BENZOYL PEROXIDE 10; 50 MG/G; MG/G
GEL TOPICAL
Qty: 35 G | Refills: 3 | Status: SHIPPED | OUTPATIENT
Start: 2023-09-26

## 2023-09-27 ENCOUNTER — TELEMEDICINE (OUTPATIENT)
Dept: PSYCHOLOGY | Age: 33
End: 2023-09-27
Payer: COMMERCIAL

## 2023-09-27 DIAGNOSIS — F41.1 GAD (GENERALIZED ANXIETY DISORDER): ICD-10-CM

## 2023-09-27 DIAGNOSIS — F90.0 ATTENTION DEFICIT HYPERACTIVITY DISORDER (ADHD), PREDOMINANTLY INATTENTIVE TYPE: ICD-10-CM

## 2023-09-27 DIAGNOSIS — F41.0 PANIC DISORDER: ICD-10-CM

## 2023-09-27 DIAGNOSIS — F33.1 MODERATE EPISODE OF RECURRENT MAJOR DEPRESSIVE DISORDER (HCC): Primary | ICD-10-CM

## 2023-09-27 PROCEDURE — 90832 PSYTX W PT 30 MINUTES: CPT | Performed by: PSYCHOLOGIST

## 2023-09-27 NOTE — PROGRESS NOTES
Behavioral Health Consultation  Lana Mora, Ph.D.  Psychologist  9/27/2023  11:40 AM      Time spent with Patient: 28 minutes  This is patient's  58th   [facility] appointment. Reason for Consult:    Chief Complaint   Patient presents with    Depression       Feedback given to PCP. S:  Pt seen for f/u of depression, anxiety. Pt reported stable mood and sxs. Reminded pt to upload insurance card. Feeling stressed about how much there is to do in life:   - skin care  - clean house  - work  - drink water  - walk/gym  - eat healthy/prepare food  - socializing    Discussed normative response, working smarter not harder. Concerned about resurgence of SAD, psychoed on variables. Setting meaningful boundaries at work. Reviewed male validation. Wanting to drink less, vape less w friends.      O:  MSE:    Appearance    alert, cooperative  Appetite normal  Sleep disturbance yes  Fatigue yes  Loss of pleasure No  Impulsive behavior No  Speech    spontaneous, normal rate, normal volume, and well articulated  Mood    anxious  Affect    normal affect  Thought Content    intact  Thought Process    linear, goal directed, and coherent  Associations    logical connections  Insight    Fair  Judgment    Intact  Orientation    oriented to person, place, time, and general circumstances  Memory    recent and remote memory intact  Attention/Concentration    impaired  Morbid ideation No  Suicide Assessment    no suicidal ideation    History:  Social History:   Social History     Socioeconomic History    Marital status: Single     Spouse name: Not on file    Number of children: 0    Years of education: 12    Highest education level: Not on file   Occupational History    Occupation:  grad     Comment: biology    Occupation:  at [employer]   Tobacco Use    Smoking status: Former     Packs/day: 0.25     Years: 0.50     Additional pack years: 0.00     Total pack years: 0.13     Types: Cigarettes     Start date:

## 2023-10-25 ENCOUNTER — OFFICE VISIT (OUTPATIENT)
Dept: INTERNAL MEDICINE CLINIC | Age: 33
End: 2023-10-25
Payer: COMMERCIAL

## 2023-10-25 ENCOUNTER — TELEMEDICINE (OUTPATIENT)
Dept: PSYCHOLOGY | Age: 33
End: 2023-10-25
Payer: COMMERCIAL

## 2023-10-25 VITALS
HEIGHT: 68 IN | SYSTOLIC BLOOD PRESSURE: 110 MMHG | WEIGHT: 213 LBS | BODY MASS INDEX: 32.28 KG/M2 | HEART RATE: 68 BPM | DIASTOLIC BLOOD PRESSURE: 70 MMHG

## 2023-10-25 DIAGNOSIS — F33.1 MODERATE EPISODE OF RECURRENT MAJOR DEPRESSIVE DISORDER (HCC): Primary | ICD-10-CM

## 2023-10-25 DIAGNOSIS — R10.2 SUPRAPUBIC PAIN, ACUTE: Primary | ICD-10-CM

## 2023-10-25 DIAGNOSIS — F41.1 GAD (GENERALIZED ANXIETY DISORDER): ICD-10-CM

## 2023-10-25 DIAGNOSIS — A08.4 VIRAL GASTROENTERITIS: ICD-10-CM

## 2023-10-25 DIAGNOSIS — N30.00 ACUTE CYSTITIS WITHOUT HEMATURIA: ICD-10-CM

## 2023-10-25 DIAGNOSIS — F90.0 ATTENTION DEFICIT HYPERACTIVITY DISORDER (ADHD), PREDOMINANTLY INATTENTIVE TYPE: ICD-10-CM

## 2023-10-25 LAB
BILIRUBIN, POC: ABNORMAL
BLOOD URINE, POC: ABNORMAL
CLARITY, POC: ABNORMAL
COLOR, POC: YELLOW
GLUCOSE URINE, POC: ABNORMAL
KETONES, POC: ABNORMAL
LEUKOCYTE EST, POC: ABNORMAL
NITRITE, POC: ABNORMAL
PH, POC: 7.5
PROTEIN, POC: ABNORMAL
SPECIFIC GRAVITY, POC: 1.02
UROBILINOGEN, POC: 0.2

## 2023-10-25 PROCEDURE — 81002 URINALYSIS NONAUTO W/O SCOPE: CPT | Performed by: FAMILY MEDICINE

## 2023-10-25 PROCEDURE — 90832 PSYTX W PT 30 MINUTES: CPT | Performed by: PSYCHOLOGIST

## 2023-10-25 PROCEDURE — 99213 OFFICE O/P EST LOW 20 MIN: CPT | Performed by: FAMILY MEDICINE

## 2023-10-25 RX ORDER — CIPROFLOXACIN 500 MG/1
500 TABLET, FILM COATED ORAL 2 TIMES DAILY
Qty: 14 TABLET | Refills: 0 | Status: SHIPPED | OUTPATIENT
Start: 2023-10-25 | End: 2023-11-01

## 2023-10-25 NOTE — PROGRESS NOTES
10/25/2023    Fifi Laird (:  1990) is a 29 y.o. female, here for evaluation of the following chief complaint(s):  Nausea & Vomiting and Diarrhea (Started last week. )      ASSESSMENT/PLAN:    1. Suprapubic pain, acute  R/o UTI due to recent repeated diarrhea vs colitis/diverticulitis   - POCT Urinalysis no Micro  - Culture, Urine    2. Acute cystitis without hematuria  - ciprofloxacin (CIPRO) 500 MG tablet; Take 1 tablet by mouth 2 times daily for 7 days  Dispense: 14 tablet; Refill: 0    3. Viral gastroenteritis  Reason for diarrhea and vomiting. This is resolved (resolving). Low fiber diet for a few days, then add fiber back gradually       FOLLOW UP:  Return if symptoms worsen or fail to improve. HPI  Dewayne 10/15/23: had a belly ache. Monday vomited every time she ate and this lasted one week. Also had explosive diarrhea until 2 days ago. Now has lower abdominal pain that hurts to push to urinate or defectate. It started to hurt to sit and walk. She did take Miralax yesterday and today to see if it was constipation. Did defecate but no change in the suprapubic pain. Also a bit of LLQ pain now also. No fever. Was around a child with GI virus right before she got sick        Outpatient Medications Marked as Taking for the 10/25/23 encounter (Office Visit) with Aurelia Daley MD   Medication Sig Dispense Refill    clindamycin-benzoyl peroxide (BENZACLIN) 1-5 % gel APPLY EXTERNALLY TO THE AFFECTED AREA TWICE DAILY 35 g 3    amphetamine-dextroamphetamine (ADDERALL, 5MG,) 5 MG tablet Take 1 tablet by mouth daily as needed (need to focus on work or a meeting) for up to 30 days. Max Daily Amount: 5 mg 30 tablet 0    Erenumab-aooe (AIMOVIG) 140 MG/ML SOAJ Inject 140 mg into the skin every 30 days 1 mL 5    fluticasone (FLONASE) 50 MCG/ACT nasal spray 1-2 sprays each nostril daily.  16 g 11    metoclopramide (REGLAN) 10 MG tablet 1/2 - 1 tab at beginning of meals and if

## 2023-10-25 NOTE — PROGRESS NOTES
Behavioral Health Consultation  Navjot Galicia, Ph.D.  Psychologist  10/25/2023  9:33 AM      Time spent with Patient: 27 minutes  This is patient's  59th   [facility] appointment. Reason for Consult:    Chief Complaint   Patient presents with    Depression       Feedback given to PCP. S:  Pt seen for f/u of depression, anxiety. Pt reported worsened mood and sxs. Has been struggling w consistent GI issues w vomiting and diarrhea. At the same time, SAD has arrived, has been sleeping on couch for 2 wks for no obvious reason, but noted she sleeps very well there. In summer, was having regular time w friends every weekend at the pool, now more limited social and outdoor activities. Has been able to drink less, though. She did totally stop vaping. She continues to exercise M-F, still getting 10,000 steps or more a day, cooking most days w meal kits. Noted irritability, lack of desire to do things despite forcing herself to do them anyway. Praised behavioral activation. Discussed medication \"If I have to I will, but I want to see how it goes. \"     Plans to bring [employer] to work, take Huey Alvarez ended up dying, buried in mom's back yard. Date [employer] single events, wants to go to the next one.      O:  MSE:  Appearance    alert, cooperative  Appetite normal  Sleep disturbance yes  Fatigue yes  Loss of pleasure No  Impulsive behavior No  Speech    spontaneous, normal rate, normal volume, and well articulated  Mood    anxious  Affect    normal affect  Thought Content    intact  Thought Process    linear, goal directed, and coherent  Associations    logical connections  Insight    Fair  Judgment    Intact  Orientation    oriented to person, place, time, and general circumstances  Memory    recent and remote memory intact  Attention/Concentration    impaired  Morbid ideation No  Suicide Assessment    no suicidal ideation    History:  Social History:   Social History     Socioeconomic History    Marital status:

## 2023-10-25 NOTE — PATIENT INSTRUCTIONS
Looks like a bladder infection. I suggest low fiber diet for a few days and then gradually add fiber back when feeling better.

## 2023-10-26 ENCOUNTER — TELEPHONE (OUTPATIENT)
Dept: INTERNAL MEDICINE CLINIC | Age: 33
End: 2023-10-26

## 2023-10-26 LAB — BACTERIA UR CULT: NORMAL

## 2023-10-26 RX ORDER — METRONIDAZOLE 500 MG/1
500 TABLET ORAL 2 TIMES DAILY
Qty: 14 TABLET | Refills: 0 | Status: SHIPPED | OUTPATIENT
Start: 2023-10-26 | End: 2023-11-02

## 2023-10-26 NOTE — TELEPHONE ENCOUNTER
Patient informed that this will be added to the treatment plan, and will be sent to the pharmacy today when patients are done being seen.  She voiced understanding

## 2023-10-26 NOTE — TELEPHONE ENCOUNTER
Pt saw you yesterday for uti---calling this morning 99 temp lightheaded and dizzy--nauseous--wanted you to know this--told her takes little time for medicine to work but she wanted to know if anything else she can do--please call the pt. Thanks.

## 2023-10-30 ENCOUNTER — HOSPITAL ENCOUNTER (OUTPATIENT)
Dept: CT IMAGING | Age: 33
Discharge: HOME | End: 2023-10-30
Attending: FAMILY MEDICINE
Payer: COMMERCIAL

## 2023-10-30 ENCOUNTER — TELEPHONE (OUTPATIENT)
Dept: INTERNAL MEDICINE CLINIC | Age: 33
End: 2023-10-30

## 2023-10-30 DIAGNOSIS — R10.9 ACUTE ABDOMINAL PAIN: ICD-10-CM

## 2023-10-30 DIAGNOSIS — R10.9 ACUTE ABDOMINAL PAIN: Primary | ICD-10-CM

## 2023-10-30 PROCEDURE — 74177 CT ABD & PELVIS W/CONTRAST: CPT

## 2023-10-30 PROCEDURE — 6360000004 HC RX CONTRAST MEDICATION: Performed by: FAMILY MEDICINE

## 2023-10-30 RX ADMIN — IOPAMIDOL 75 ML: 755 INJECTION, SOLUTION INTRAVENOUS at 15:53

## 2023-10-30 RX ADMIN — IOHEXOL 50 ML: 240 INJECTION, SOLUTION INTRATHECAL; INTRAVASCULAR; INTRAVENOUS; ORAL at 15:53

## 2023-10-30 NOTE — TELEPHONE ENCOUNTER
Pt called in regards to still having stomach pain. She sent a [employer] message this morning about feeling better but stated since then it has came back. Pt wondering if CT scan could be ordered like they discussed at appointment. If any questions or concerns please call Pt.

## 2023-10-30 NOTE — TELEPHONE ENCOUNTER
Need to call to get stat CT scan of abd and pelvis. I put in the order. Need to call to get it scheduled for her. [facility] [place] should be fine. I suspect she would be willing to go to [facility] if need be.

## 2023-11-06 ENCOUNTER — PATIENT MESSAGE (OUTPATIENT)
Dept: INTERNAL MEDICINE CLINIC | Age: 33
End: 2023-11-06

## 2023-11-06 RX ORDER — DICYCLOMINE HYDROCHLORIDE 10 MG/1
10 CAPSULE ORAL 3 TIMES DAILY PRN
Qty: 60 CAPSULE | Refills: 1 | Status: SHIPPED | OUTPATIENT
Start: 2023-11-06

## 2023-11-09 DIAGNOSIS — K21.9 GASTROESOPHAGEAL REFLUX DISEASE WITHOUT ESOPHAGITIS: ICD-10-CM

## 2023-11-09 RX ORDER — METOCLOPRAMIDE 10 MG/1
TABLET ORAL
Qty: 90 TABLET | Refills: 0 | Status: SHIPPED | OUTPATIENT
Start: 2023-11-09

## 2023-12-04 ENCOUNTER — TELEMEDICINE (OUTPATIENT)
Dept: PSYCHOLOGY | Age: 33
End: 2023-12-04
Payer: COMMERCIAL

## 2023-12-04 DIAGNOSIS — F90.0 ATTENTION DEFICIT HYPERACTIVITY DISORDER (ADHD), PREDOMINANTLY INATTENTIVE TYPE: ICD-10-CM

## 2023-12-04 DIAGNOSIS — F33.1 MODERATE EPISODE OF RECURRENT MAJOR DEPRESSIVE DISORDER (HCC): Primary | ICD-10-CM

## 2023-12-04 DIAGNOSIS — F41.1 GAD (GENERALIZED ANXIETY DISORDER): ICD-10-CM

## 2023-12-04 PROCEDURE — 90832 PSYTX W PT 30 MINUTES: CPT | Performed by: PSYCHOLOGIST

## 2023-12-04 NOTE — PROGRESS NOTES
Behavioral Health Consultation  yKle Bryson, Ph.D.  Psychologist  2023  9:14 AM      Time spent with Patient: 23 minutes  This is patient's  60th   [facility] appointment. Reason for Consult:    Chief Complaint   Patient presents with    Depression       Feedback given to PCP. S:  Pt seen for f/u of depression, anxiety, PTSD. Pt reported poor mood and sxs. Having considerable abd pain, severe diarrhea, getting a little better on new medication. 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 Despite this, is still working out and taking supplements, happy light. Taking L-gluatmine, ashwaganda, vit D3. Wants to talk about her friend, Olya Zamora. They have liked e/o for a long time, but he has a pattern of drinking heavily and sometimes operating a vehicle. Had always been able to keep feelings at [facility], but their flirtation escalated this weekend and now she feels like she has \"no control\" over how often she is thinking about him. Discussed pattern of this and difference btwn thought and action.       O:  MSE:  Appearance    alert, cooperative  Appetite normal  Sleep disturbance yes  Fatigue yes  Loss of pleasure No  Impulsive behavior No  Speech    spontaneous, normal rate, normal volume, and well articulated  Mood    anxious  Affect    normal affect  Thought Content    intact  Thought Process    linear, goal directed, and coherent  Associations    logical connections  Insight    Fair  Judgment    Intact  Orientation    oriented to person, place, time, and general circumstances  Memory    recent and remote memory intact  Attention/Concentration    impaired  Morbid ideation No  Suicide Assessment    no suicidal ideation    History:  Social History:   Social History     Socioeconomic History    Marital status: Single     Spouse name: Not on file    Number of children: 0    Years of education: 12    Highest education level: Not on file

## 2023-12-18 PROBLEM — R37 SEXUAL DYSFUNCTION: Status: ACTIVE | Noted: 2023-12-18

## 2024-01-03 ENCOUNTER — TELEMEDICINE (OUTPATIENT)
Dept: PSYCHOLOGY | Age: 34
End: 2024-01-03

## 2024-01-03 DIAGNOSIS — F41.1 GAD (GENERALIZED ANXIETY DISORDER): ICD-10-CM

## 2024-01-03 DIAGNOSIS — F90.0 ATTENTION DEFICIT HYPERACTIVITY DISORDER (ADHD), PREDOMINANTLY INATTENTIVE TYPE: Primary | ICD-10-CM

## 2024-01-03 NOTE — PROGRESS NOTES
Marital status: Single     Spouse name: Not on file    Number of children: 0    Years of education: 12    Highest education level: Not on file   Occupational History    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    Occupation:  at [employer]   Tobacco Use    Smoking status: Former     Current packs/day: 0.00     Average packs/day: 0.3 packs/day for 0.5 years (0.1 ttl pk-yrs)     Types: Cigarettes     Start date: 11/1/2013     Quit date: 12/28/2013     Years since quitting: 10.0    Smokeless tobacco: Never    Tobacco comments:     Denies smoking at this time   Substance and Sexual Activity    Alcohol use: Yes     Comment: h/o weekend binges 5 or more drinks when out 1-2x/week;  11/14:  cut most to all drinking out    Drug use: Yes     Types: Marijuana (Weed)    Sexual activity: Not Currently     Partners: Male     Birth control/protection: Condom, OCP   Other Topics Concern    Not on file   Social History Narrative    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     Social Determinants of Health     Financial Resource Strain: Low Risk  (4/21/2023)    Overall Financial Resource Strain (CARDIA)     Difficulty of Paying Living Expenses: Not hard at all   Food Insecurity: Not on file (4/21/2023)   Transportation Needs: Unknown (4/21/2023)    PRAPARE - Transportation     Lack of Transportation (Medical): Not on file     Lack of Transportation (Non-Medical): No   Physical Activity: Not on file   Stress: Not on file   Social Connections: Not on file   Intimate Partner Violence: Not on file   Housing Stability: Unknown (4/21/2023)    Housing Stability Vital Sign     Unable to Pay for Housing in the Last Year: Not on file     Number of Places Lived in the Last Year: Not on file     Unstable Housing in the Last Year: No     TOBACCO:   reports that she quit smoking about 10 years ago. Her smoking use included cigarettes. She started smoking about 10 years ago. She has a 0.1 pack-year

## 2024-01-08 ENCOUNTER — OFFICE VISIT (OUTPATIENT)
Dept: INTERNAL MEDICINE CLINIC | Age: 34
End: 2024-01-08
Payer: COMMERCIAL

## 2024-01-08 VITALS
DIASTOLIC BLOOD PRESSURE: 70 MMHG | WEIGHT: 215 LBS | SYSTOLIC BLOOD PRESSURE: 118 MMHG | HEIGHT: 68 IN | HEART RATE: 68 BPM | BODY MASS INDEX: 32.58 KG/M2 | OXYGEN SATURATION: 98 %

## 2024-01-08 DIAGNOSIS — K11.21 PAROTITIS, ACUTE: Primary | ICD-10-CM

## 2024-01-08 PROCEDURE — 99213 OFFICE O/P EST LOW 20 MIN: CPT | Performed by: FAMILY MEDICINE

## 2024-01-08 RX ORDER — CLINDAMYCIN HYDROCHLORIDE 300 MG/1
300 CAPSULE ORAL 3 TIMES DAILY
Qty: 30 CAPSULE | Refills: 0 | Status: SHIPPED | OUTPATIENT
Start: 2024-01-08 | End: 2024-01-18

## 2024-01-08 NOTE — PROGRESS NOTES
2024    Mc Baumann (:  1990) is a 33 y.o. female, here for evaluation of the following chief complaint(s):  Facial Swelling (Right side of face below eye swelling that is painful. Woke her up from sleeping consistent pain but more painful when touched. Started  morning. )        ASSESSMENT/PLAN:    1. Parotitis, acute  Discussed using heat on the face and drinking lots of water.  Notify office if not improved and go to ER if much worse or fever develops    - clindamycin (CLEOCIN) 300 MG capsule; Take 1 capsule by mouth 3 times daily for 10 days  Dispense: 30 capsule; Refill: 0        FOLLOW UP:  Call or return to clinic prn if these symptoms worsen or fail to improve as anticipated.      HPI    Started yesterday  24 when she woke up she had swelling and pain in the right cheekbone area of the face, and feels a lump under the skin on right cheek.  Her face hurt during that night and her sleep was restless because of this.  Cannot feel anything inside her mouth.  Has been a while since she saw a dentist.      No fever.  No saliva changes.  Has a non migraine different headache for the past 3 days.  No foul taste or drainage in her throat.  No nasal symptoms.  No pus that she can tell inside the mouth.    Did not squeeze or home treat any skin lesions on her face.      Outpatient Medications Marked as Taking for the 24 encounter (Office Visit) with Bailey Ruiz MD   Medication Sig Dispense Refill    Erenumab-aooe (AIMOVIG) 140 MG/ML SOAJ Inject 140 mg into the skin every 30 days 1 mL 5    ALPRAZolam (XANAX) 0.5 MG tablet Take 1-2 tablets by mouth daily as needed (panic attack) for up to 60 days. 20 pills to last at least 30 days;  all refills for 90 days. Max Daily Amount: 1 mg 20 tablet 2    metoclopramide (REGLAN) 10 MG tablet 1/2 - 1 tab at beginning of meals and if needed 1 at bedtime.  Do not use more than 3 weeks in a row without breaking for a week. 90 tablet 0

## 2024-01-08 NOTE — PATIENT INSTRUCTIONS
Heat to the cheek to help get the antibiotic there and help it potentially drain.   Lots of water.

## 2024-01-18 ENCOUNTER — TELEMEDICINE (OUTPATIENT)
Dept: PSYCHOLOGY | Age: 34
End: 2024-01-18
Payer: COMMERCIAL

## 2024-01-18 DIAGNOSIS — F41.1 GAD (GENERALIZED ANXIETY DISORDER): Primary | ICD-10-CM

## 2024-01-18 DIAGNOSIS — F90.0 ATTENTION DEFICIT HYPERACTIVITY DISORDER (ADHD), PREDOMINANTLY INATTENTIVE TYPE: ICD-10-CM

## 2024-01-18 PROCEDURE — 90832 PSYTX W PT 30 MINUTES: CPT | Performed by: PSYCHOLOGIST

## 2024-01-18 NOTE — PROGRESS NOTES
History    Marital status: Single     Spouse name: Not on file    Number of children: 0    Years of education: 12    Highest education level: Not on file   Occupational History    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    Occupation:  at [employer]   Tobacco Use    Smoking status: Former     Current packs/day: 0.00     Average packs/day: 0.3 packs/day for 0.5 years (0.1 ttl pk-yrs)     Types: Cigarettes     Start date: 11/1/2013     Quit date: 12/28/2013     Years since quitting: 10.0    Smokeless tobacco: Never    Tobacco comments:     Denies smoking at this time   Substance and Sexual Activity    Alcohol use: Yes     Comment: h/o weekend binges 5 or more drinks when out 1-2x/week;  11/14:  cut most to all drinking out    Drug use: Yes     Types: Marijuana (Weed)    Sexual activity: Not Currently     Partners: Male     Birth control/protection: Condom, OCP   Other Topics Concern    Not on file   Social History Narrative    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     Social Determinants of Health     Financial Resource Strain: Low Risk  (4/21/2023)    Overall Financial Resource Strain (CARDIA)     Difficulty of Paying Living Expenses: Not hard at all   Food Insecurity: Not on file (4/21/2023)   Transportation Needs: Unknown (4/21/2023)    PRAPARE - Transportation     Lack of Transportation (Medical): Not on file     Lack of Transportation (Non-Medical): No   Physical Activity: Not on file   Stress: Not on file   Social Connections: Not on file   Intimate Partner Violence: Not on file   Housing Stability: Unknown (4/21/2023)    Housing Stability Vital Sign     Unable to Pay for Housing in the Last Year: Not on file     Number of Places Lived in the Last Year: Not on file     Unstable Housing in the Last Year: No     TOBACCO:   reports that she quit smoking about 10 years ago. Her smoking use included cigarettes. She started smoking about 10 years ago. She has a 0.1

## 2024-01-24 ENCOUNTER — OFFICE VISIT (OUTPATIENT)
Dept: INTERNAL MEDICINE CLINIC | Age: 34
End: 2024-01-24
Payer: COMMERCIAL

## 2024-01-24 VITALS
DIASTOLIC BLOOD PRESSURE: 70 MMHG | OXYGEN SATURATION: 99 % | BODY MASS INDEX: 32.43 KG/M2 | SYSTOLIC BLOOD PRESSURE: 122 MMHG | HEART RATE: 60 BPM | HEIGHT: 68 IN | WEIGHT: 214 LBS

## 2024-01-24 DIAGNOSIS — R10.30 LOWER ABDOMINAL PAIN: Primary | ICD-10-CM

## 2024-01-24 DIAGNOSIS — R10.2 PELVIC PAIN: ICD-10-CM

## 2024-01-24 DIAGNOSIS — Z12.4 SCREENING FOR CERVICAL CANCER: ICD-10-CM

## 2024-01-24 LAB
BILIRUBIN, POC: NORMAL
BLOOD URINE, POC: NORMAL
CLARITY, POC: CLEAR
COLOR, POC: NORMAL
GLUCOSE URINE, POC: NORMAL
KETONES, POC: NORMAL
LEUKOCYTE EST, POC: NORMAL
NITRITE, POC: NORMAL
PH, POC: 6.5
PROTEIN, POC: NORMAL
SPECIFIC GRAVITY, POC: 1.01
UROBILINOGEN, POC: NORMAL

## 2024-01-24 PROCEDURE — 99213 OFFICE O/P EST LOW 20 MIN: CPT | Performed by: FAMILY MEDICINE

## 2024-01-24 PROCEDURE — 81002 URINALYSIS NONAUTO W/O SCOPE: CPT | Performed by: FAMILY MEDICINE

## 2024-01-24 NOTE — PATIENT INSTRUCTIONS
See gynecologist to check for endometriosis if all my testing is normal.     I would be happy to do a referral if you know who you want to see of I can recommend someone.

## 2024-01-24 NOTE — PROGRESS NOTES
needed 1 at bedtime.  Do not use more than 3 weeks in a row without breaking for a week. 90 tablet 0   • dicyclomine (BENTYL) 10 MG capsule Take 1 capsule by mouth 3 times daily as needed (abdominal cramps) 60 capsule 1   • clindamycin-benzoyl peroxide (BENZACLIN) 1-5 % gel APPLY EXTERNALLY TO THE AFFECTED AREA TWICE DAILY 35 g 3   • propranolol (INDERAL) 10 MG tablet TAKE 1-2 TABLETS BY MOUTH THREE TIMES DAILY AS NEEDED(ANXIETY, PANIC ATTACK) 30 tablet 2   • SUMAtriptan (IMITREX) 100 MG tablet TAKE ONE TABLET BY MOUTH AT ONSET OF HEADACHE; MAY REPEAT ONE TABLET IN 2 HOURS IF NEEDED. (MAX DOSE OF TWO TABLETS IN 24 HOURS ) 9 tablet 2   • ondansetron (ZOFRAN-ODT) 4 MG disintegrating tablet DISSOLVE ONE TABLET BY MOUTH EVERY 4 TO 6 HOURS AS NEEDED FOR NAUSEA 12 tablet 5   • butalbital-acetaminophen-caffeine (FIORICET, ESGIC) -40 MG per tablet TAKE ONE TABLET BY MOUTH EVERY 4 HOURS AS NEEDED FOR HEADACHES **LIMIT TO 2 DAYS PER WEEK, DO NOT USE DAILY** 30 tablet 5   • fluticasone (FLONASE) 50 MCG/ACT nasal spray 1-2 sprays each nostril daily. 16 g 11   • hydrOXYzine pamoate (VISTARIL) 25 MG capsule 25 mg at bedtime for 3 nights to break the cycle of headache then PRN 30 capsule 3   • Riboflavin 400 MG TABS      • omeprazole (PRILOSEC) 20 MG delayed release capsule Take 1 capsule by mouth daily as needed (for flare up.  take until well and then stop for a while) 30 capsule 3   • naproxen sodium (ANAPROX) 550 MG tablet Take one at onset of migraine or musculoskeletal pain and repeat in 6 hours or more but maximum of 2 doses per day. 60 tablet 2   • cetirizine (ZYRTEC) 10 MG tablet Take 1 tablet by mouth daily Works best to take at bedtime. 90 tablet 3       Review of Systems    OBJECTIVE:    Vitals:    01/24/24 1409   BP: 122/70   Pulse: 60   SpO2: 99%   Weight: 97.1 kg (214 lb)   Height: 1.727 m (5' 8\")       Physical Exam  Vitals reviewed.   Constitutional:       General: She is not in acute distress.

## 2024-01-25 LAB — HPV16+18+H RISK 12 DNA SPEC-IMP: NORMAL

## 2024-01-29 ENCOUNTER — HOSPITAL ENCOUNTER (OUTPATIENT)
Dept: ULTRASOUND IMAGING | Age: 34
Discharge: HOME | End: 2024-01-29
Attending: FAMILY MEDICINE
Payer: COMMERCIAL

## 2024-01-29 DIAGNOSIS — R10.2 PELVIC PAIN: ICD-10-CM

## 2024-01-29 LAB
C TRACH DNA UR QL NAA+PROBE: NEGATIVE
N GONORRHOEA DNA UR QL NAA+PROBE: NEGATIVE

## 2024-01-29 PROCEDURE — 76830 TRANSVAGINAL US NON-OB: CPT

## 2024-01-30 LAB
HPV HR 12 DNA SPEC QL NAA+PROBE: NOT DETECTED
HPV16 DNA SPEC QL NAA+PROBE: NOT DETECTED
HPV16+18+H RISK 12 DNA SPEC-IMP: NORMAL
HPV18 DNA SPEC QL NAA+PROBE: NOT DETECTED

## 2024-01-30 NOTE — RESULT ENCOUNTER NOTE
You do have cysts on both ovaries.   I recommend you see a gynecologist for follow up.  This could be causing your pain.

## 2024-02-15 ENCOUNTER — TELEMEDICINE (OUTPATIENT)
Dept: PSYCHOLOGY | Age: 34
End: 2024-02-15
Payer: COMMERCIAL

## 2024-02-15 DIAGNOSIS — F90.0 ATTENTION DEFICIT HYPERACTIVITY DISORDER (ADHD), PREDOMINANTLY INATTENTIVE TYPE: ICD-10-CM

## 2024-02-15 DIAGNOSIS — F41.1 GAD (GENERALIZED ANXIETY DISORDER): Primary | ICD-10-CM

## 2024-02-15 PROCEDURE — 90832 PSYTX W PT 30 MINUTES: CPT | Performed by: PSYCHOLOGIST

## 2024-02-15 NOTE — PROGRESS NOTES
Behavioral Health Consultation  Gabi Zaragoza, Ph.D.  Psychologist  2/15/2024  8:38 AM      Time spent with Patient: 30 minutes  This is patient's  63rd   [facility] appointment.    Reason for Consult:    Chief Complaint   Patient presents with    Depression    Anxiety       Feedback given to PCP.    S:  Pt seen for f/u of anxiety, depression, ADHD. Pt reported improving mood and sxs w more sunshine and warmer weather, has been walking outside daily and notices improvement in mood. However, stated her home is the messiest it has been in 1 yr, feels motivated to work on it today. Is excited to have a date this weekend w [employer]. Is struggling w cutting calories bc of cravings desire for larger portion sizes.     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 It is a small paycut, but would be hourly and eligible for OT, so she could easily make more. Work has indicated that she has the position, but they are trying to figure out pay. Hopeful this will allow her to have more work-life balance.      O:  MSE:  Appearance    alert, cooperative  Appetite normal  Sleep disturbance yes  Fatigue yes  Loss of pleasure No  Impulsive behavior No  Speech    spontaneous, normal rate, normal volume, and well articulated  Mood    euthymic mood  Affect    normal affect  Thought Content    intact  Thought Process    linear, goal directed, and coherent  Associations    logical connections  Insight    Fair  Judgment    Intact  Orientation    oriented to person, place, time, and general circumstances  Memory    recent and remote memory intact  Attention/Concentration    impaired  Morbid ideation No  Suicide Assessment    no suicidal ideation    History:  Social History:   Social History     Socioeconomic History    Marital status: Single     Spouse name: Not on file    Number of children: 0    Years of education: 12    Highest education level: Not on file   Occupational History

## 2024-02-21 ENCOUNTER — OFFICE VISIT (OUTPATIENT)
Dept: INTERNAL MEDICINE CLINIC | Age: 34
End: 2024-02-21
Payer: COMMERCIAL

## 2024-02-21 VITALS
HEART RATE: 68 BPM | BODY MASS INDEX: 32.43 KG/M2 | HEIGHT: 68 IN | WEIGHT: 214 LBS | DIASTOLIC BLOOD PRESSURE: 70 MMHG | SYSTOLIC BLOOD PRESSURE: 110 MMHG

## 2024-02-21 DIAGNOSIS — Z79.899 CHRONIC USE OF BENZODIAZEPINE FOR THERAPEUTIC PURPOSE: Primary | ICD-10-CM

## 2024-02-21 DIAGNOSIS — F41.0 PANIC DISORDER: ICD-10-CM

## 2024-02-21 PROBLEM — F33.1 MODERATE EPISODE OF RECURRENT MAJOR DEPRESSIVE DISORDER (HCC): Status: RESOLVED | Noted: 2023-06-05 | Resolved: 2024-02-21

## 2024-02-21 PROCEDURE — 99213 OFFICE O/P EST LOW 20 MIN: CPT | Performed by: FAMILY MEDICINE

## 2024-02-21 RX ORDER — ALPRAZOLAM 0.5 MG/1
.5-1 TABLET ORAL DAILY PRN
Qty: 20 TABLET | Refills: 2 | Status: SHIPPED | OUTPATIENT
Start: 2024-03-01 | End: 2024-05-30

## 2024-02-21 ASSESSMENT — PATIENT HEALTH QUESTIONNAIRE - PHQ9
4. FEELING TIRED OR HAVING LITTLE ENERGY: 1
7. TROUBLE CONCENTRATING ON THINGS, SUCH AS READING THE NEWSPAPER OR WATCHING TELEVISION: 2
5. POOR APPETITE OR OVEREATING: 1
SUM OF ALL RESPONSES TO PHQ QUESTIONS 1-9: 8
10. IF YOU CHECKED OFF ANY PROBLEMS, HOW DIFFICULT HAVE THESE PROBLEMS MADE IT FOR YOU TO DO YOUR WORK, TAKE CARE OF THINGS AT HOME, OR GET ALONG WITH OTHER PEOPLE: 1
SUM OF ALL RESPONSES TO PHQ QUESTIONS 1-9: 8
2. FEELING DOWN, DEPRESSED OR HOPELESS: 1
3. TROUBLE FALLING OR STAYING ASLEEP: 2
6. FEELING BAD ABOUT YOURSELF - OR THAT YOU ARE A FAILURE OR HAVE LET YOURSELF OR YOUR FAMILY DOWN: 0
SUM OF ALL RESPONSES TO PHQ QUESTIONS 1-9: 8
SUM OF ALL RESPONSES TO PHQ QUESTIONS 1-9: 8
SUM OF ALL RESPONSES TO PHQ9 QUESTIONS 1 & 2: 2
1. LITTLE INTEREST OR PLEASURE IN DOING THINGS: 1
9. THOUGHTS THAT YOU WOULD BE BETTER OFF DEAD, OR OF HURTING YOURSELF: 0
8. MOVING OR SPEAKING SO SLOWLY THAT OTHER PEOPLE COULD HAVE NOTICED. OR THE OPPOSITE, BEING SO FIGETY OR RESTLESS THAT YOU HAVE BEEN MOVING AROUND A LOT MORE THAN USUAL: 0

## 2024-02-21 NOTE — PROGRESS NOTES
2024    Mc Baumann (:  1990) is a 33 y.o. female, here for evaluation of the following chief complaint(s):  3 Month Follow-Up        ASSESSMENT/PLAN:  1. Use of benzodiazepine for therapeutic purpose  [place] profile checked and is valid.      2. Panic disorder  Prn med  - ALPRAZolam (XANAX) 0.5 MG tablet; Take 1-2 tablets by mouth daily as needed (panic attack) for up to 90 days. 20 pills to last at least 30 days;  all refills for 90 days. Max Daily Amount: 1 mg  Dispense: 20 tablet; Refill: 2    Use Dramamine for her flight if she is anxious or feels motion sick     FOLLOW UP:  Return in about 12 weeks (around 5/15/2024).      HPI    Seasonal blues were less of an issue this year with exercise, Vitamin D and her happy light.    90 day follow up for controlled substance(s)    OD risk:  320  Using medication for:  Panic and anxiety disorder   Functional improvement noted compared to before taking this controlled medication:  able to nip panic attacks out.  Approx 10 panic attacks per month and normally has to redose 2 times that day.    Adverse effects:  none     Aberrant behavior: none  Lorazepam Equiv dose:  0.5 - 0.6 mg last refill  Xanax 0.5 mg tablets on 24, 23, 23    Last period was OK without pain.    Going to [facility] in April.  Going with a friend.   She is a bit worried about how she is going to deal with a long airline flight.  She is going to fly first to [place] to meet with her friend for a few days and they will go together.  I encouraged her to try a drowsy Dramamine when she is on the last leg of her flight.      See assessment above for other issues addressed at this visit.    Outpatient Medications Marked as Taking for the 24 encounter (Office Visit) with Bailey Ruiz MD   Medication Sig Dispense Refill    Erenumab-aooe (AIMOVIG) 140 MG/ML SOAJ Inject 140 mg into the skin every 30 days 1 mL 5    ALPRAZolam (XANAX) 0.5 MG tablet Take 1-2 tablets by

## 2024-02-21 NOTE — PATIENT INSTRUCTIONS
I am suspicious for polycystic ovary.  But could be just ovarian cyst that went away.  If it keeps reoccurring, can consider metformin for this.    Recommend the drowsy Dramamine for the flight.

## 2024-02-26 ENCOUNTER — PATIENT MESSAGE (OUTPATIENT)
Dept: INTERNAL MEDICINE CLINIC | Age: 34
End: 2024-02-26

## 2024-02-26 DIAGNOSIS — R10.2 SUPRAPUBIC PAIN, ACUTE: ICD-10-CM

## 2024-02-26 DIAGNOSIS — R10.9 ACUTE ABDOMINAL PAIN: Primary | ICD-10-CM

## 2024-02-26 NOTE — TELEPHONE ENCOUNTER
From: Mc Baumann  To: Dr. Bailey Ruiz  Sent: 2/26/2024 9:29 AM EST  Subject: Referral for gyno     Hi,  I called the Dr. I am hoping to get into for gynecology his name is Dr. Boone Del Toro and they said I need a referral, I’m hoping you can help me with that!    Over the weekend I have been having some pelvis pain at one point it was pretty bad and brought me to tears so I’m hoping to get in soon.    Thank you!

## 2024-03-14 ENCOUNTER — TELEMEDICINE (OUTPATIENT)
Dept: PSYCHOLOGY | Age: 34
End: 2024-03-14
Payer: COMMERCIAL

## 2024-03-14 DIAGNOSIS — F41.1 GAD (GENERALIZED ANXIETY DISORDER): Primary | ICD-10-CM

## 2024-03-14 PROCEDURE — 90832 PSYTX W PT 30 MINUTES: CPT | Performed by: PSYCHOLOGIST

## 2024-03-14 NOTE — PROGRESS NOTES
disorder)        Plan:  Pt interventions:  Trained in strategies for increasing balanced thinking, Discussed self-care (sleep, nutrition, rewarding activities, social support, exercise), Supportive techniques, and Identified maladaptive thoughts    Mc Baumann was evaluated through a synchronous (real-time) audio-video encounter. The patient (or guardian if applicable) is aware that this is a billable service, which includes applicable co-pays. This Virtual Visit was conducted with patient's (and/or legal guardian's) consent. Patient identification was verified, and a caregiver was present when appropriate.   The patient was located at Home: [address]  [facility] [address]  Provider was located at Facility (Appt Dept): [address] [place] [address]       Total time spent for this encounter:  25m    --Gabi Zaragoza, PhD on 3/14/2024 at 8:38 AM    An electronic signature was used to authenticate this note.      Documentation was done using voice recognition dragon software.  Every effort was made to ensure accuracy; however, inadvertent, unintentional computerized transcription errors may be present.

## 2024-03-20 DIAGNOSIS — F90.0 ATTENTION DEFICIT HYPERACTIVITY DISORDER (ADHD), PREDOMINANTLY INATTENTIVE TYPE: ICD-10-CM

## 2024-03-21 RX ORDER — DEXTROAMPHETAMINE SACCHARATE, AMPHETAMINE ASPARTATE, DEXTROAMPHETAMINE SULFATE AND AMPHETAMINE SULFATE 1.25; 1.25; 1.25; 1.25 MG/1; MG/1; MG/1; MG/1
5 TABLET ORAL DAILY PRN
Qty: 30 TABLET | Refills: 0 | Status: SHIPPED | OUTPATIENT
Start: 2024-03-21 | End: 2024-04-20

## 2024-03-25 ENCOUNTER — OFFICE VISIT (OUTPATIENT)
Dept: INTERNAL MEDICINE CLINIC | Age: 34
End: 2024-03-25

## 2024-03-25 VITALS
HEART RATE: 82 BPM | DIASTOLIC BLOOD PRESSURE: 76 MMHG | WEIGHT: 211 LBS | BODY MASS INDEX: 31.98 KG/M2 | SYSTOLIC BLOOD PRESSURE: 100 MMHG | OXYGEN SATURATION: 98 % | HEIGHT: 68 IN

## 2024-03-25 DIAGNOSIS — R10.9 RIGHT FLANK PAIN: Primary | ICD-10-CM

## 2024-03-25 DIAGNOSIS — F90.0 ATTENTION DEFICIT HYPERACTIVITY DISORDER (ADHD), PREDOMINANTLY INATTENTIVE TYPE: ICD-10-CM

## 2024-03-25 RX ORDER — DEXTROAMPHETAMINE SACCHARATE, AMPHETAMINE ASPARTATE, DEXTROAMPHETAMINE SULFATE AND AMPHETAMINE SULFATE 1.25; 1.25; 1.25; 1.25 MG/1; MG/1; MG/1; MG/1
5 TABLET ORAL 2 TIMES DAILY
Qty: 60 TABLET | Refills: 0 | Status: SHIPPED | OUTPATIENT
Start: 2024-04-04 | End: 2024-05-04

## 2024-03-25 RX ORDER — ACETAMINOPHEN AND CODEINE PHOSPHATE 120; 12 MG/5ML; MG/5ML
1 SOLUTION ORAL DAILY
Qty: 28 TABLET | Refills: 0 | Status: SHIPPED | OUTPATIENT
Start: 2024-03-25 | End: 2024-04-22

## 2024-03-25 NOTE — PROGRESS NOTES
assessment above for other issues addressed at this visit.    Outpatient Medications Marked as Taking for the 3/25/24 encounter (Office Visit) with Bailey Ruiz MD   Medication Sig Dispense Refill    amphetamine-dextroamphetamine (ADDERALL, 5MG,) 5 MG tablet Take 1 tablet by mouth daily as needed (need to focus on work or a meeting) for up to 30 days. Max Daily Amount: 5 mg 30 tablet 0    ALPRAZolam (XANAX) 0.5 MG tablet Take 1-2 tablets by mouth daily as needed (panic attack) for up to 90 days. 20 pills to last at least 30 days;  all refills for 90 days. Max Daily Amount: 1 mg 20 tablet 2    Erenumab-aooe (AIMOVIG) 140 MG/ML SOAJ Inject 140 mg into the skin every 30 days 1 mL 5    clindamycin-benzoyl peroxide (BENZACLIN) 1-5 % gel APPLY EXTERNALLY TO THE AFFECTED AREA TWICE DAILY 35 g 3    fluticasone (FLONASE) 50 MCG/ACT nasal spray 1-2 sprays each nostril daily. 16 g 11    Riboflavin 400 MG TABS       omeprazole (PRILOSEC) 20 MG delayed release capsule Take 1 capsule by mouth daily as needed (for flare up.  take until well and then stop for a while) 30 capsule 3       Review of Systems    OBJECTIVE:    Vitals:    03/25/24 0920   BP: 100/76   Pulse: 82   SpO2: 98%   Weight: 95.7 kg (211 lb)   Height: 1.727 m (5' 8\")       Physical Exam      An electronic signature was used to authenticate this note.    Bailey Ruiz MD

## 2024-03-31 NOTE — PROGRESS NOTES
Subjective:      Patient ID: Stephen Najera is a 34 y.o. female. Chief Complaint   Patient presents with    3 Month Follow-Up     12 week med check      She is working full time at [employer] at the [employer]. Still does some part time activity planning. Anxiety:  Does pretty good for a few months and then it flares up. Using lorazepam about 20 tabs per month of 1 mg. LME is 0.67. OD risk 320. Only tried the amphetamine for ADD one time. Tried 2 at once = 5 mg and did not help. Wants to try extended release when she gets insurance. LR of lorazepam 1/27/20 and prior 1/2/20. Headaches have been rough. She is working on weight loss. CBD seems to help the pain but not the nausea. Hoping she can get on other medication when insurance kicks in. Working on weight loss. She hates the food where she works, so this is OK. She is doing Weight Watchers. She is her best friends' wedding in Sept.  She is hoping weight loss helps her headaches. See Assessment for more detail on conditions addressed today.     Patient Active Problem List   Diagnosis    Migraine without aura and without status migrainosus, not intractable    ADHD (attention deficit hyperactivity disorder)    Allergic rhinitis    Kidney stone    Hypersomnia    VERA (generalized anxiety disorder)    Oral contraceptive pill surveillance    GERD (gastroesophageal reflux disease)    Cervico-occipital neuralgia    Chronic use of benzodiazepine for therapeutic purpose    Obstructive sleep apnea (adult) (pediatric)         Outpatient Medications Marked as Taking for the 2/14/20 encounter (Office Visit) with Fiorella Sanchez MD   Medication Sig Dispense Refill    ALPRAZolam (XANAX) 0.5 MG tablet TAKE 1 TABLET BY MOUTH AT ONSET OF PANIC ATTACK AND REPEAT SECOND DOSE IN ONE HOUR IF NOT RESOLVED 20 tablet 2    escitalopram (LEXAPRO) 20 MG tablet Take 1 tablet by mouth daily TAKE 1 TABLET BY MOUTH EVERY DAY 30 tablet 5    No

## 2024-04-05 DIAGNOSIS — G43.109 MIGRAINE WITH AURA AND WITHOUT STATUS MIGRAINOSUS, NOT INTRACTABLE: ICD-10-CM

## 2024-04-08 RX ORDER — BUTALBITAL, ACETAMINOPHEN AND CAFFEINE 50; 325; 40 MG/1; MG/1; MG/1
TABLET ORAL
Qty: 30 TABLET | Refills: 5 | Status: SHIPPED | OUTPATIENT
Start: 2024-04-08

## 2024-04-09 ENCOUNTER — PATIENT MESSAGE (OUTPATIENT)
Dept: INTERNAL MEDICINE CLINIC | Age: 34
End: 2024-04-09

## 2024-04-09 RX ORDER — FLUTICASONE PROPIONATE 50 MCG
SPRAY, SUSPENSION (ML) NASAL
Qty: 16 G | Refills: 5 | Status: SHIPPED | OUTPATIENT
Start: 2024-04-09

## 2024-04-10 RX ORDER — PREDNISONE 20 MG/1
TABLET ORAL
Qty: 10 TABLET | Refills: 0 | Status: SHIPPED | OUTPATIENT
Start: 2024-04-10

## 2024-04-10 RX ORDER — AMOXICILLIN AND CLAVULANATE POTASSIUM 875; 125 MG/1; MG/1
1 TABLET, FILM COATED ORAL 2 TIMES DAILY
Qty: 14 TABLET | Refills: 0 | Status: SHIPPED | OUTPATIENT
Start: 2024-04-10 | End: 2024-04-17

## 2024-04-10 NOTE — TELEPHONE ENCOUNTER
Felisa Navarro MA 4/9/2024 11:30 AM EDT      ----- Message -----  From: Mc Baumann  Sent: 4/9/2024 11:09 AM EDT  To: *  Subject: Sinuses     Hi!    I fly to [place] next weds and then [place] on the 20th and am currently having lots of sinus drainage. I’m wondering if I could get an antibiotic to take with me in case things don’t clear up/get worse with the plane (hoping everything clears out by then!) and wanted to see your thoughts on this. I’m hoping I’ll be fine and won’t even need it!    Also my rib has been a little better so I did end up holding off with that stuff but might do it when I get back if it’s still bothering me!    Thank you

## 2024-04-15 ENCOUNTER — TELEMEDICINE (OUTPATIENT)
Dept: PSYCHOLOGY | Age: 34
End: 2024-04-15
Payer: COMMERCIAL

## 2024-04-15 DIAGNOSIS — F90.0 ATTENTION DEFICIT HYPERACTIVITY DISORDER (ADHD), PREDOMINANTLY INATTENTIVE TYPE: ICD-10-CM

## 2024-04-15 DIAGNOSIS — F41.1 GAD (GENERALIZED ANXIETY DISORDER): Primary | ICD-10-CM

## 2024-04-15 PROCEDURE — 90832 PSYTX W PT 30 MINUTES: CPT | Performed by: PSYCHOLOGIST

## 2024-04-15 NOTE — PROGRESS NOTES
Behavioral Health Consultation  Gabi Zaragoza, Ph.D.  Psychologist  4/15/2024  7:56 AM      Time spent with Patient: 30 minutes  This is patient's  67th   [facility] appointment.    Reason for Consult:    Chief Complaint   Patient presents with    Stress       Feedback given to PCP.    S:  Pt seen for f/u of anxiety, Seasonal Affective Disorder. Pt reported generally positive mood and sxs, but is nervous about trip to [facility]. Mostly anxious about leaving her cats and the length of the flights. 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     Did have a sexual experience w man, pleased she was able to have fun, be sober, and didn't feel sick afterward. Was pleased she didn't initially feel \"obsessive,\" but some of this has returned.     Problem-solved prevention of post-trip blues.        O:  MSE:  Appearance    alert, cooperative  Appetite normal  Sleep disturbance yes  Fatigue yes  Loss of pleasure No  Impulsive behavior No  Speech    spontaneous, normal rate, normal volume, and well articulated  Mood    euthymic mood  Affect    normal affect  Thought Content    intact  Thought Process    linear, goal directed, and coherent  Associations    logical connections  Insight    Fair  Judgment    Intact  Orientation    oriented to person, place, time, and general circumstances  Memory    recent and remote memory intact  Attention/Concentration    impaired  Morbid ideation No  Suicide Assessment    no suicidal ideation    History:  Social History:   Social History     Socioeconomic History    Marital status: Single     Spouse name: Not on file    Number of children: 0    Years of education: 12    Highest education level: Not on file   Occupational History    Occupation:  grad     Comment: biology    Occupation: works at [employer]     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

## 2024-04-16 RX ORDER — ONDANSETRON 4 MG/1
TABLET, ORALLY DISINTEGRATING ORAL
Qty: 12 TABLET | Refills: 1 | Status: SHIPPED | OUTPATIENT
Start: 2024-04-16

## 2024-05-07 ENCOUNTER — PATIENT MESSAGE (OUTPATIENT)
Dept: INTERNAL MEDICINE CLINIC | Age: 34
End: 2024-05-07

## 2024-05-08 NOTE — TELEPHONE ENCOUNTER
Lilian Chow RN 5/7/2024 4:04 PM EDT      ----- Message -----  From: Mc Baumann  Sent: 5/7/2024 3:26 PM EDT  To: *  Subject: med check     hello, are we able to make my appointment on the 15th a virtual visit by chance?

## 2024-05-15 ENCOUNTER — TELEMEDICINE (OUTPATIENT)
Dept: INTERNAL MEDICINE CLINIC | Age: 34
End: 2024-05-15
Payer: COMMERCIAL

## 2024-05-15 DIAGNOSIS — Z51.81 ENCOUNTER FOR MONITORING STIMULANT THERAPY: ICD-10-CM

## 2024-05-15 DIAGNOSIS — Z79.899 ENCOUNTER FOR MONITORING STIMULANT THERAPY: ICD-10-CM

## 2024-05-15 DIAGNOSIS — Z79.899 CHRONIC USE OF BENZODIAZEPINE FOR THERAPEUTIC PURPOSE: Primary | ICD-10-CM

## 2024-05-15 DIAGNOSIS — F90.0 ATTENTION DEFICIT HYPERACTIVITY DISORDER (ADHD), PREDOMINANTLY INATTENTIVE TYPE: ICD-10-CM

## 2024-05-15 DIAGNOSIS — F41.0 PANIC DISORDER: ICD-10-CM

## 2024-05-15 PROCEDURE — 99213 OFFICE O/P EST LOW 20 MIN: CPT | Performed by: FAMILY MEDICINE

## 2024-05-15 RX ORDER — DEXTROAMPHETAMINE SACCHARATE, AMPHETAMINE ASPARTATE, DEXTROAMPHETAMINE SULFATE AND AMPHETAMINE SULFATE 1.25; 1.25; 1.25; 1.25 MG/1; MG/1; MG/1; MG/1
5 TABLET ORAL 2 TIMES DAILY
Qty: 60 TABLET | Refills: 0 | Status: SHIPPED | OUTPATIENT
Start: 2024-06-19 | End: 2024-07-19

## 2024-05-15 RX ORDER — DEXTROAMPHETAMINE SACCHARATE, AMPHETAMINE ASPARTATE, DEXTROAMPHETAMINE SULFATE AND AMPHETAMINE SULFATE 1.25; 1.25; 1.25; 1.25 MG/1; MG/1; MG/1; MG/1
5 TABLET ORAL 2 TIMES DAILY
Qty: 60 TABLET | Refills: 0 | Status: SHIPPED | OUTPATIENT
Start: 2024-05-20 | End: 2024-06-19

## 2024-05-15 RX ORDER — ALPRAZOLAM 0.5 MG/1
.5-1 TABLET ORAL DAILY PRN
Qty: 20 TABLET | Refills: 2 | Status: SHIPPED | OUTPATIENT
Start: 2024-06-05 | End: 2024-09-03

## 2024-05-15 SDOH — ECONOMIC STABILITY: FOOD INSECURITY: WITHIN THE PAST 12 MONTHS, YOU WORRIED THAT YOUR FOOD WOULD RUN OUT BEFORE YOU GOT MONEY TO BUY MORE.: NEVER TRUE

## 2024-05-15 SDOH — ECONOMIC STABILITY: INCOME INSECURITY: HOW HARD IS IT FOR YOU TO PAY FOR THE VERY BASICS LIKE FOOD, HOUSING, MEDICAL CARE, AND HEATING?: NOT HARD AT ALL

## 2024-05-15 SDOH — ECONOMIC STABILITY: FOOD INSECURITY: WITHIN THE PAST 12 MONTHS, THE FOOD YOU BOUGHT JUST DIDN'T LAST AND YOU DIDN'T HAVE MONEY TO GET MORE.: NEVER TRUE

## 2024-05-15 NOTE — PROGRESS NOTES
Mc Baumann, was evaluated through a synchronous (real-time) audio-video encounter. The patient (or guardian if applicable) is aware that this is a billable service, which includes applicable co-pays. This Virtual Visit was conducted with patient's (and/or legal guardian's) consent. Patient identification was verified, and a caregiver was present when appropriate.   The patient was located at Home: work location in [place], [place]  Provider was located at Facility (Appt Dept): [address] [place] [id number]  Confirm you are appropriately licensed, registered, or certified to deliver care in the state where the patient is located as indicated above. If you are not or unsure, please re-schedule the visit: Yes, I confirm.     Mc Baumann (:  1990) is a Established patient, presenting virtually for evaluation of the following:  Chief Complaint   Patient presents with    Follow-up     12 week          Assessment & Plan   Below is the assessment and plan developed based on review of pertinent history, physical exam, labs, studies, and medications.  1. Use of benzodiazepine for therapeutic purpose  [place] profile checked and is valid.      2. Encounter for monitoring stimulant therapy  Using adderall as needed.  Not taking daily.  Does not take on the weekends.    [place] profile checked and is valid.      3. Attention deficit hyperactivity disorder (ADHD), predominantly inattentive type  - amphetamine-dextroamphetamine (ADDERALL, 5MG,) 5 MG tablet; Take 1 tablet by mouth 2 times daily for 30 days. Max Daily Amount: 10 mg  Dispense: 60 tablet; Refill: 0  - amphetamine-dextroamphetamine (ADDERALL, 5MG,) 5 MG tablet; Take 1 tablet by mouth 2 times daily for 30 days. Max Daily Amount: 10 mg  Dispense: 60 tablet; Refill: 0    4. Panic disorder  - ALPRAZolam (XANAX) 0.5 MG tablet; Take 1-2 tablets by mouth daily as needed (panic attack) for up to 90 days. 20 pills to last at least 30 days;  all

## 2024-05-20 ENCOUNTER — TELEMEDICINE (OUTPATIENT)
Dept: PSYCHOLOGY | Age: 34
End: 2024-05-20

## 2024-05-20 DIAGNOSIS — F90.0 ATTENTION DEFICIT HYPERACTIVITY DISORDER (ADHD), PREDOMINANTLY INATTENTIVE TYPE: Primary | ICD-10-CM

## 2024-05-20 DIAGNOSIS — F41.1 GAD (GENERALIZED ANXIETY DISORDER): ICD-10-CM

## 2024-05-20 NOTE — PROGRESS NOTES
Behavioral Health Consultation  Gabi Zaragoza, Ph.D.  Psychologist  5/20/2024  8:04 AM      Time spent with Patient: 27 minutes  This is patient's  68th   [facility] appointment.    Reason for Consult:    Chief Complaint   Patient presents with    Anxiety       Feedback given to PCP.    S:  Pt seen for f/u of anxiety, ADHD. Pt reported stable mood and sxs. Had endo flare upon her return, is scheduled for hysterectomy in a few weeks. \"Terrified\" of surgery, thinks it is about the lack of control. Leave will be unpaid bc she just used all of her leave, working extra serving shifts to save money. Feeling okay about not being able to be pregnant in the future should she decide to have children.     Went on a date yesterday w someone she met at a bar. Had an okay time, but was a bit bored. Recognizes she is more comfortable in chaotic relationships.     Talk about friendship w Marie next time.     O:  MSE:  Appearance    alert, cooperative  Appetite normal  Sleep disturbance yes  Fatigue yes  Loss of pleasure No  Impulsive behavior No  Speech    spontaneous, normal rate, normal volume, and well articulated  Mood    euthymic mood  Affect    normal affect  Thought Content    intact  Thought Process    linear, goal directed, and coherent  Associations    logical connections  Insight    Fair  Judgment    Intact  Orientation    oriented to person, place, time, and general circumstances  Memory    recent and remote memory intact  Attention/Concentration    impaired  Morbid ideation No  Suicide Assessment    no suicidal ideation    History:  Social History:   Social History     Socioeconomic History    Marital status: Single     Spouse name: Not on file    Number of children: 0    Years of education: 12    Highest education level: Not on file   Occupational History    Occupation: [email or web address]     Comment: biology    Occupation: works at [employer]     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

## 2024-06-13 ENCOUNTER — TELEMEDICINE (OUTPATIENT)
Dept: PSYCHOLOGY | Age: 34
End: 2024-06-13
Payer: COMMERCIAL

## 2024-06-13 DIAGNOSIS — F41.1 GAD (GENERALIZED ANXIETY DISORDER): Primary | ICD-10-CM

## 2024-06-13 DIAGNOSIS — F90.0 ATTENTION DEFICIT HYPERACTIVITY DISORDER (ADHD), PREDOMINANTLY INATTENTIVE TYPE: ICD-10-CM

## 2024-06-13 PROCEDURE — 90832 PSYTX W PT 30 MINUTES: CPT | Performed by: PSYCHOLOGIST

## 2024-06-13 NOTE — PROGRESS NOTES
Behavioral Health Consultation  Gabi Zaragoza, Ph.D.  Psychologist  6/13/2024  10:37 AM      Time spent with Patient: 28 minutes  This is patient's  69th   [facility] appointment.    Reason for Consult:    Chief Complaint   Patient presents with    ADHD       Feedback given to PCP.    S:  Pt seen for f/u of anxiety. Pt reported heightened anxiety w surgery tomorrow. Has been pulling out eye brows, which is rare and she only engages when very anxious. Walked 20,000 steps d/t pacing. Anxiety about not waking up or will wake up to something being terribly wrong. Surgeon agreed to her taking alprazolam morning of. Discussed process of surgery.     O:  MSE:  Appearance    alert, cooperative  Appetite normal  Sleep disturbance yes  Fatigue yes  Loss of pleasure No  Impulsive behavior No  Speech    spontaneous, normal rate, normal volume, and well articulated  Mood    anxious mood  Affect    anxious affect  Thought Content    intact  Thought Process    linear, goal directed, and coherent  Associations    logical connections  Insight    Fair  Judgment    Intact  Orientation    oriented to person, place, time, and general circumstances  Memory    recent and remote memory intact  Attention/Concentration    impaired  Morbid ideation No  Suicide Assessment    no suicidal ideation    History:  Social History:   Social History     Socioeconomic History    Marital status: Single     Spouse name: Not on file    Number of children: 0    Years of education: 12    Highest education level: Not on file   Occupational History    Occupation: [employer]     Comment: [employer]    Occupation: works at [employer]     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

## 2024-06-27 ENCOUNTER — TELEPHONE (OUTPATIENT)
Dept: INTERNAL MEDICINE CLINIC | Age: 34
End: 2024-06-27

## 2024-06-28 NOTE — TELEPHONE ENCOUNTER
Submitted PA for AIMOVIG  [facility]ia [facility] Key: [id number]  STATUS: PENDING.    Follow up done daily; if no decision with in three days we will refax.  If another three days goes by with no decision will call the insurance for status.

## 2024-07-01 ENCOUNTER — TELEPHONE (OUTPATIENT)
Dept: INTERNAL MEDICINE CLINIC | Age: 34
End: 2024-07-01

## 2024-07-01 NOTE — TELEPHONE ENCOUNTER
Care Transitions Initial Follow Up Call    Outreach made within 2 business days of discharge: Yes    Patient: Mc Baumann Patient : 1990   MRN: [id number]  Reason for Admission: There are no discharge diagnoses documented for the most recent discharge.  Discharge Date: 10/20/20       Spoke with: Mc    Discharge department/facility: home    TCM Interactive Patient Contact:  Was patient able to fill all prescriptions: Yes  Was patient instructed to bring all medications to the follow-up visit: Yes  Is patient taking all medications as directed in the discharge summary? Yes  Does patient understand their discharge instructions: Yes  Does patient have questions or concerns that need addressed prior to 7-14 day follow up office visit: no    Scheduled appointment with PCP within 7-14 days    Follow Up  Future Appointments   Date Time Provider Department Center   2024 12:30 PM Gabi Zaragoza, PhD [facility]   2024  2:40 PM [facility] Neymar Navarro MA

## 2024-07-03 NOTE — TELEPHONE ENCOUNTER
Is there any update on this? Patient is messaging us daily and called her ins and was told nothing has been started.

## 2024-07-03 NOTE — TELEPHONE ENCOUNTER
The medication is APPROVED THRU 01/02/2025    If this requires a response please respond to the pool ( P [facility] P[facility] MEDICATION PRE-AUTH).      Thank you please advise patient.

## 2024-07-03 NOTE — TELEPHONE ENCOUNTER
PA was ran thru [facility]. BCBS is primary. Will resubmit.     Submitted PA for Aimovig 140MG/ML auto-injectors    Via [facility]  (Key: [id number]) STATUS: PENDING.    Follow up done daily; if no decision with in three days we will refax.  If another three days goes by with no decision will call the insurance for status.

## 2024-07-08 ENCOUNTER — OFFICE VISIT (OUTPATIENT)
Dept: INTERNAL MEDICINE CLINIC | Age: 34
End: 2024-07-08

## 2024-07-08 ENCOUNTER — TELEMEDICINE (OUTPATIENT)
Dept: PSYCHOLOGY | Age: 34
End: 2024-07-08
Payer: COMMERCIAL

## 2024-07-08 VITALS
HEART RATE: 65 BPM | WEIGHT: 197 LBS | OXYGEN SATURATION: 99 % | SYSTOLIC BLOOD PRESSURE: 118 MMHG | BODY MASS INDEX: 29.86 KG/M2 | HEIGHT: 68 IN | DIASTOLIC BLOOD PRESSURE: 68 MMHG

## 2024-07-08 DIAGNOSIS — N83.8: ICD-10-CM

## 2024-07-08 DIAGNOSIS — G89.29 TOE PAIN, CHRONIC, RIGHT: ICD-10-CM

## 2024-07-08 DIAGNOSIS — N76.0 ABSCESS OF VAGINA: ICD-10-CM

## 2024-07-08 DIAGNOSIS — F41.0 PANIC DISORDER: ICD-10-CM

## 2024-07-08 DIAGNOSIS — Z51.81 ENCOUNTER FOR MONITORING STIMULANT THERAPY: ICD-10-CM

## 2024-07-08 DIAGNOSIS — R30.0 DYSURIA: ICD-10-CM

## 2024-07-08 DIAGNOSIS — M79.674 TOE PAIN, CHRONIC, RIGHT: ICD-10-CM

## 2024-07-08 DIAGNOSIS — Z79.899 ENCOUNTER FOR MONITORING STIMULANT THERAPY: ICD-10-CM

## 2024-07-08 DIAGNOSIS — F41.1 GAD (GENERALIZED ANXIETY DISORDER): Primary | ICD-10-CM

## 2024-07-08 DIAGNOSIS — N80.9 ENDOMETRIOSIS: ICD-10-CM

## 2024-07-08 DIAGNOSIS — Z79.899 CHRONIC USE OF BENZODIAZEPINE FOR THERAPEUTIC PURPOSE: ICD-10-CM

## 2024-07-08 DIAGNOSIS — Z09 HOSPITAL DISCHARGE FOLLOW-UP: Primary | ICD-10-CM

## 2024-07-08 DIAGNOSIS — F90.0 ATTENTION DEFICIT HYPERACTIVITY DISORDER (ADHD), PREDOMINANTLY INATTENTIVE TYPE: ICD-10-CM

## 2024-07-08 PROCEDURE — 90832 PSYTX W PT 30 MINUTES: CPT | Performed by: PSYCHOLOGIST

## 2024-07-08 RX ORDER — ALPRAZOLAM 0.5 MG/1
.5-1 TABLET ORAL DAILY PRN
Qty: 20 TABLET | Refills: 2 | Status: SHIPPED | OUTPATIENT
Start: 2024-07-08 | End: 2024-10-06

## 2024-07-08 RX ORDER — AMOXICILLIN AND CLAVULANATE POTASSIUM 875; 125 MG/1; MG/1
1 TABLET, FILM COATED ORAL EVERY 12 HOURS
COMMUNITY
Start: 2024-06-26 | End: 2024-07-13

## 2024-07-08 RX ORDER — DEXTROAMPHETAMINE SACCHARATE, AMPHETAMINE ASPARTATE, DEXTROAMPHETAMINE SULFATE AND AMPHETAMINE SULFATE 1.25; 1.25; 1.25; 1.25 MG/1; MG/1; MG/1; MG/1
5 TABLET ORAL 2 TIMES DAILY
Qty: 60 TABLET | Refills: 0 | Status: SHIPPED | OUTPATIENT
Start: 2024-07-08 | End: 2024-08-07

## 2024-07-08 NOTE — PROGRESS NOTES
Behavioral Health Consultation  Gabi Zaragoza, Ph.D.  Psychologist  7/8/2024  1:06 PM      Time spent with Patient: 27 minutes  This is patient's  70th   [facility] appointment.    Reason for Consult:    Chief Complaint   Patient presents with    Anxiety       Feedback given to PCP.    S:  Pt seen for f/u of anxiety, panic. Pt reported variable mood and sxs. Surgery was successful, but had post-surg complications w infection. Had 4 day hospital stay, required IV abx. Been trying to go for regular walks, but obviously not able to go as far as she'd like right now (1 mile vs 5 miles). Irritated by not being able to do certain household tasks d/t weight restriction. 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 Doesn't enjoy doing crafts, not enjoying watching TV bc it isn't always good. Discussed better-than-nothing mentality.      O:  MSE:  Appearance    alert, cooperative  Appetite normal  Sleep disturbance yes  Fatigue yes  Loss of pleasure No  Impulsive behavior No  Speech    spontaneous, normal rate, normal volume, and well articulated  Mood    depressed mood  Affect    depressed affect  Thought Content    intact  Thought Process    linear, goal directed, and coherent  Associations    logical connections  Insight    Fair  Judgment    Intact  Orientation    oriented to person, place, time, and general circumstances  Memory    recent and remote memory intact  Attention/Concentration    impaired  Morbid ideation No  Suicide Assessment    no suicidal ideation    History:  Social History:   Social History     Socioeconomic History    Marital status: Single     Spouse name: Not on file    Number of children: 0    Years of education: 12    Highest education level: Not on file   Occupational History    Occupation: [employer]     Comment: biology    Occupation: works at [employer]     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

## 2024-07-08 NOTE — PROGRESS NOTES
had to be over 11 years ago.  Referral to podiatrist again to see if they can help with this.  - Nela Preston DPM, Podiatry, [facility]  9. Endometriosis  10. Endosalpingosis  Reason for hysterectomy and surgical treatment.        Medical Decision Making: moderate complexity    Return if symptoms worsen or fail to improve.  Otherwise she needs to follow-up within 12 weeks for controlled substances.         Subjective:   HPI  Admitted to vaginal cuff abscess after surgery for endometriosis and endosalpingosis.   VINCE ROBOTIC TOTAL HYSTERECTOMY 06/14/2024 Vagina/B[facility] Dr[facility] Del [facility] Excision Endometriosis and Appendectomy / Labioplasty      Inpatient course: Discharge summary reviewed- see chart.  Peritoneal abscess/postoperative wound abscess status post hysterectomy  Pelvic pain uncontrolled as an outpatient   Dyschezia    Interval history/Current status:   She is having some discomfort but also some dysuria.    She is off work until July 27th.        90 day follow up for controlled substance(s)    OD risk:  320  up due to pain med from recent surgery.   Using medication for: Panic disorder and attention deficit disorder.  Functional improvement noted compared to before taking this controlled medication: Alprazolam helps prevent going to the emergency room for a panic attack and to function with calming her physical symptoms.  Adderall helps her focus and concentrate better at work and uses very much as needed.  Adverse effects: None  Aberrant behavior: None  Lorazepam Equiv dose:  0.6 mg last refill  6/6/24    Adderall 5/20/24.   Has not been taking it since off work with her surgery.      She can have a bowel movement but she feels like she can poop and then she feels it gets stuck in her anus.    Hopefully this will improve as she recovers from her abscess and surgery.    Patient Active Problem List   Diagnosis    Migraine with aura and without status migrainosus, not intractable

## 2024-07-09 PROBLEM — N76.0 ABSCESS OF VAGINA: Status: ACTIVE | Noted: 2024-07-09

## 2024-07-09 PROBLEM — N80.9 ENDOMETRIOSIS: Status: RESOLVED | Noted: 2024-07-09 | Resolved: 2024-07-09

## 2024-07-09 PROBLEM — R59.1 LYMPHADENOPATHY: Status: RESOLVED | Noted: 2023-01-22 | Resolved: 2024-07-09

## 2024-07-09 PROBLEM — N83.8: Status: RESOLVED | Noted: 2024-07-09 | Resolved: 2024-07-09

## 2024-07-09 PROBLEM — N80.9 ENDOMETRIOSIS: Status: ACTIVE | Noted: 2024-07-09

## 2024-07-09 PROBLEM — N83.8: Status: ACTIVE | Noted: 2024-07-09

## 2024-07-09 LAB
BACTERIA URNS QL MICRO: ABNORMAL /HPF
BILIRUB UR QL STRIP.AUTO: NEGATIVE
CHARACTER UR: ABNORMAL
CLARITY UR: ABNORMAL
COLOR UR: YELLOW
EPI CELLS #/AREA URNS AUTO: 20 /HPF (ref 0–5)
GLUCOSE UR STRIP.AUTO-MCNC: NEGATIVE MG/DL
HGB UR QL STRIP.AUTO: ABNORMAL
HYALINE CASTS #/AREA URNS AUTO: 1 /LPF (ref 0–8)
KETONES UR STRIP.AUTO-MCNC: NEGATIVE MG/DL
LEUKOCYTE ESTERASE UR QL STRIP.AUTO: ABNORMAL
MUCOUS THREADS #/AREA URNS LPF: ABNORMAL /LPF
NITRITE UR QL STRIP.AUTO: NEGATIVE
PH UR STRIP.AUTO: 6.5 [PH] (ref 5–8)
PROT UR STRIP.AUTO-MCNC: 30 MG/DL
RBC #/AREA URNS HPF: ABNORMAL /HPF (ref 0–4)
SP GR UR STRIP.AUTO: 1.02 (ref 1–1.03)
UA COMPLETE W REFLEX CULTURE PNL UR: YES
UA DIPSTICK W REFLEX MICRO PNL UR: YES
URN SPEC COLLECT METH UR: ABNORMAL
UROBILINOGEN UR STRIP-ACNC: 0.2 E.U./DL
WBC #/AREA URNS HPF: ABNORMAL /HPF (ref 0–5)
YEAST URNS QL MICRO: PRESENT /HPF

## 2024-07-09 RX ORDER — FLUCONAZOLE 150 MG/1
TABLET ORAL
Qty: 1 TABLET | Refills: 1 | Status: SHIPPED | OUTPATIENT
Start: 2024-07-09

## 2024-07-10 ENCOUNTER — TELEPHONE (OUTPATIENT)
Dept: INTERNAL MEDICINE CLINIC | Age: 34
End: 2024-07-10

## 2024-07-10 LAB — BACTERIA UR CULT: NORMAL

## 2024-07-10 NOTE — TELEPHONE ENCOUNTER
Pt calling, saw Dr. Ruiz on 7/8 for hysterectomy f/u. Pt states about 1 hr ago she started bleeding and had 2 small clots. Pt unsure if this is normal, did hear discharge would be but not sure about bleeding. Please advise and call pt.

## 2024-07-10 NOTE — TELEPHONE ENCOUNTER
Patient informed. She was advised to call them back if she does not hear from them within the hour and ask for the on call provider. Gave her written excuse to miss work until evaluated.

## 2024-07-18 ENCOUNTER — PATIENT MESSAGE (OUTPATIENT)
Dept: INTERNAL MEDICINE CLINIC | Age: 34
End: 2024-07-18

## 2024-07-18 DIAGNOSIS — M54.81 CERVICO-OCCIPITAL NEURALGIA: ICD-10-CM

## 2024-07-18 RX ORDER — METHOCARBAMOL 500 MG/1
TABLET, FILM COATED ORAL
Qty: 40 TABLET | Refills: 2 | Status: SHIPPED | OUTPATIENT
Start: 2024-07-18 | End: 2024-08-01

## 2024-07-18 NOTE — TELEPHONE ENCOUNTER
From: Mc Baumann  To: Dr. Bailey Ruiz  Sent: 7/18/2024 11:40 AM EDT  Subject: Neck spasms    Hi I am having the worse neck tightness/pain. I thought I had some methocarbomal (sp) left but I can’t find it. Could you send in a refill please?    Thank you!!     PS Dr prescott extended my antibiotics until we see my scan on Friday so I may need more of the anti fungal once he officially stops them

## 2024-07-26 ENCOUNTER — TELEMEDICINE (OUTPATIENT)
Dept: PSYCHOLOGY | Age: 34
End: 2024-07-26
Payer: COMMERCIAL

## 2024-07-26 DIAGNOSIS — F90.0 ATTENTION DEFICIT HYPERACTIVITY DISORDER (ADHD), PREDOMINANTLY INATTENTIVE TYPE: ICD-10-CM

## 2024-07-26 DIAGNOSIS — F41.1 GAD (GENERALIZED ANXIETY DISORDER): Primary | ICD-10-CM

## 2024-07-26 PROCEDURE — 90832 PSYTX W PT 30 MINUTES: CPT | Performed by: PSYCHOLOGIST

## 2024-07-26 NOTE — PROGRESS NOTES
Behavioral Health Consultation  Gabi Zaragoza, Ph.D.  Psychologist  7/26/2024  10:48 AM      Time spent with Patient: 30 minutes  This is patient's  71th   [facility] appointment.    Reason for Consult:    Chief Complaint   Patient presents with    Anxiety       Feedback given to PCP.    S:  Pt seen for f/u of anxiety, panic. Pt reported stable mood and sxs. She returns to work on Monday, is anxious about seeing her boss who pt perceives as talking down to her, which other employees have also noted. Stated it feels like how [employer] talked to her. Has started to look at other jobs, but nothing sounds good like a job she would like. 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 Discussed values and how she can get that met in a job.        O:  MSE:  Appearance    alert, cooperative  Appetite normal  Sleep disturbance yes  Fatigue yes  Loss of pleasure No  Impulsive behavior No  Speech    spontaneous, normal rate, normal volume, and well articulated  Mood    depressed mood  Affect    depressed affect  Thought Content    intact  Thought Process    linear, goal directed, and coherent  Associations    logical connections  Insight    Fair  Judgment    Intact  Orientation    oriented to person, place, time, and general circumstances  Memory    recent and remote memory intact  Attention/Concentration    impaired  Morbid ideation No  Suicide Assessment    no suicidal ideation    History:  Social History:   Social History     Socioeconomic History    Marital status: Single     Spouse name: Not on file    Number of children: 0    Years of education: 12    Highest education level: Not on file   Occupational History    Occupation:  grad     Comment: biology    Occupation: works at [employer]     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

## 2024-07-29 RX ORDER — FLUCONAZOLE 150 MG/1
TABLET ORAL
Qty: 1 TABLET | Refills: 1 | Status: SHIPPED | OUTPATIENT
Start: 2024-07-29

## 2024-07-29 RX ORDER — ERENUMAB-AOOE 140 MG/ML
INJECTION, SOLUTION SUBCUTANEOUS
Qty: 1 ML | Refills: 5 | Status: SHIPPED | OUTPATIENT
Start: 2024-07-29

## 2024-08-07 ENCOUNTER — PATIENT MESSAGE (OUTPATIENT)
Dept: INTERNAL MEDICINE CLINIC | Age: 34
End: 2024-08-07

## 2024-08-07 DIAGNOSIS — R39.15 URGENCY OF URINATION: Primary | ICD-10-CM

## 2024-08-07 NOTE — TELEPHONE ENCOUNTER
Felisa Navarro MA 8/7/2024 2:39 PM EDT      ----- Message -----  From: Mc Baumann  Sent: 8/7/2024 11:42 AM EDT  To: *  Subject: Rib pain back/burning with urination still     Hi,    The rib pain that I mentioned a while back is still happening, so I think I am going to get that xray, does it need to be sent again or I just need to call to schedule? It’s now even hurting when I am not sitting.    It also still burns a bit when I pee/ a sense of urgency but no increase in frequency of visits. I don’t know if I need more of the anti fungal or what you suggest!    Also where did we decide I should go for the pinched nerve in my toes?    I just want to get all my issues taken care of while I my deductible is paid off!    Thank you

## 2024-08-08 RX ORDER — FLUCONAZOLE 150 MG/1
TABLET ORAL
Qty: 1 TABLET | Refills: 0 | Status: SHIPPED | OUTPATIENT
Start: 2024-08-08

## 2024-08-09 ENCOUNTER — HOSPITAL ENCOUNTER (OUTPATIENT)
Age: 34
Discharge: HOME | End: 2024-08-09
Payer: COMMERCIAL

## 2024-08-09 DIAGNOSIS — R10.9 RIGHT FLANK PAIN: ICD-10-CM

## 2024-08-09 PROCEDURE — 71101 X-RAY EXAM UNILAT RIBS/CHEST: CPT

## 2024-08-21 ENCOUNTER — PATIENT MESSAGE (OUTPATIENT)
Dept: INTERNAL MEDICINE CLINIC | Age: 34
End: 2024-08-21

## 2024-08-21 DIAGNOSIS — R30.0 DYSURIA: Primary | ICD-10-CM

## 2024-08-22 ENCOUNTER — TELEMEDICINE (OUTPATIENT)
Dept: PSYCHOLOGY | Age: 34
End: 2024-08-22
Payer: COMMERCIAL

## 2024-08-22 DIAGNOSIS — F41.1 GAD (GENERALIZED ANXIETY DISORDER): Primary | ICD-10-CM

## 2024-08-22 DIAGNOSIS — F90.0 ATTENTION DEFICIT HYPERACTIVITY DISORDER (ADHD), PREDOMINANTLY INATTENTIVE TYPE: ICD-10-CM

## 2024-08-22 PROCEDURE — 90832 PSYTX W PT 30 MINUTES: CPT | Performed by: PSYCHOLOGIST

## 2024-09-19 ENCOUNTER — TELEMEDICINE (OUTPATIENT)
Dept: PSYCHOLOGY | Age: 34
End: 2024-09-19
Payer: COMMERCIAL

## 2024-09-19 DIAGNOSIS — F41.1 GAD (GENERALIZED ANXIETY DISORDER): Primary | ICD-10-CM

## 2024-09-19 PROCEDURE — 90832 PSYTX W PT 30 MINUTES: CPT | Performed by: PSYCHOLOGIST

## 2024-09-19 NOTE — PROGRESS NOTES
Behavioral Health Consultation  Gabi Zaragoza, Ph.D.  Psychologist  9/19/2024  11:40 AM      Time spent with Patient: 30 minutes  This is patient's  73st   [facility] appointment.    Reason for Consult:    Chief Complaint   Patient presents with    Anxiety       Feedback given to PCP.    S:  Pt seen for f/u of anxiety. Pt reported improving mood and sxs since she has been off work d/t wrist sprain. 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 They are willing to give her another chance, but informed her her bxs have been a problem since March. [place] two others had complained about her in last 2 wks. Reported she has been fired more often than others. Was fired from lab job, didn't get fired from [employer], felt not liked by mgrs; wonders if she has too much fun at work or if her personality isn't appreciated by management.       Has MRI next week, then will find out when she can go back to work.     Is considering going to aesthetician school.     O:  MSE:  Appearance    alert, cooperative  Appetite normal  Sleep disturbance yes  Fatigue yes  Loss of pleasure No  Impulsive behavior No  Speech    spontaneous, normal rate, normal volume, and well articulated  Mood    depressed mood  Affect    depressed affect  Thought Content    intact  Thought Process    linear, goal directed, and coherent  Associations    logical connections  Insight    Fair  Judgment    Intact  Orientation    oriented to person, place, time, and general circumstances  Memory    recent and remote memory intact  Attention/Concentration    impaired  Morbid ideation No  Suicide Assessment    no suicidal ideation    History:  Social History:   Social History     Socioeconomic History    Marital status: Single     Spouse name: Not on file    Number of children: 0    Years

## 2024-09-30 ENCOUNTER — TELEMEDICINE (OUTPATIENT)
Dept: INTERNAL MEDICINE CLINIC | Age: 34
End: 2024-09-30
Payer: COMMERCIAL

## 2024-09-30 DIAGNOSIS — Z79.899 ENCOUNTER FOR MONITORING STIMULANT THERAPY: ICD-10-CM

## 2024-09-30 DIAGNOSIS — L70.0 ACNE VULGARIS: ICD-10-CM

## 2024-09-30 DIAGNOSIS — Z79.899 CHRONIC USE OF BENZODIAZEPINE FOR THERAPEUTIC PURPOSE: Primary | ICD-10-CM

## 2024-09-30 DIAGNOSIS — F90.0 ATTENTION DEFICIT HYPERACTIVITY DISORDER (ADHD), PREDOMINANTLY INATTENTIVE TYPE: ICD-10-CM

## 2024-09-30 DIAGNOSIS — F41.0 PANIC DISORDER: ICD-10-CM

## 2024-09-30 DIAGNOSIS — Z51.81 ENCOUNTER FOR MONITORING STIMULANT THERAPY: ICD-10-CM

## 2024-09-30 PROBLEM — N76.0 ABSCESS OF VAGINA: Status: RESOLVED | Noted: 2024-07-09 | Resolved: 2024-09-30

## 2024-09-30 PROCEDURE — 99213 OFFICE O/P EST LOW 20 MIN: CPT | Performed by: FAMILY MEDICINE

## 2024-09-30 RX ORDER — DEXTROAMPHETAMINE SACCHARATE, AMPHETAMINE ASPARTATE, DEXTROAMPHETAMINE SULFATE AND AMPHETAMINE SULFATE 1.25; 1.25; 1.25; 1.25 MG/1; MG/1; MG/1; MG/1
5 TABLET ORAL 2 TIMES DAILY
Qty: 60 TABLET | Refills: 0 | Status: SHIPPED | OUTPATIENT
Start: 2024-09-30 | End: 2024-10-30

## 2024-09-30 RX ORDER — CLINDAMYCIN AND BENZOYL PEROXIDE 10; 50 MG/G; MG/G
GEL TOPICAL
Qty: 35 G | Refills: 3 | Status: SHIPPED | OUTPATIENT
Start: 2024-09-30

## 2024-09-30 RX ORDER — ALPRAZOLAM 0.5 MG
.5-1 TABLET ORAL DAILY PRN
Qty: 20 TABLET | Refills: 2 | Status: SHIPPED | OUTPATIENT
Start: 2024-10-05 | End: 2025-01-03

## 2024-09-30 NOTE — ASSESSMENT & PLAN NOTE
Chronic, at goal (stable), continue current treatment plan    Orders:    amphetamine-dextroamphetamine (ADDERALL, 5MG,) 5 MG tablet; Take 1 tablet by mouth 2 times daily for 30 days. Max Daily Amount: 10 mg

## 2024-09-30 NOTE — ASSESSMENT & PLAN NOTE
+ chronic and at goal.  Continue current treatment plan  Orders:  Alprazolam 0.5 mg  1-2 tablets as needed for panic attack.  Max of 2 per day and 20 pills per 30 days.   #20 and 2 refills.

## 2024-09-30 NOTE — PROGRESS NOTES
Mc Baumann, was evaluated through a synchronous (real-time) audio-video encounter. The patient (or guardian if applicable) is aware that this is a billable service, which includes applicable co-pays. This Virtual Visit was conducted with patient's (and/or legal guardian's) consent. Patient identification was verified, and a caregiver was present when appropriate.   The patient was located at Home: [address]  [facility] [address]  Provider was located at Facility (Appt Dept): [address] [place] [id number]  Confirm you are appropriately licensed, registered, or certified to deliver care in the state where the patient is located as indicated above. If you are not or unsure, please re-schedule the visit: Yes, I confirm.     Mc Baumann (:  1990) is a Established patient, presenting virtually for evaluation of the following:      Below is the assessment and plan developed based on review of pertinent history, physical exam, labs, studies, and medications.     Assessment & Plan  Use of benzodiazepine for therapeutic purpose   Chronic, at goal (stable), continue current treatment plan         Encounter for monitoring stimulant therapy   Chronic, at goal (stable), continue current treatment plan         Attention deficit hyperactivity disorder (ADHD), predominantly inattentive type   Chronic, at goal (stable), continue current treatment plan    Orders:    amphetamine-dextroamphetamine (ADDERALL, 5MG,) 5 MG tablet; Take 1 tablet by mouth 2 times daily for 30 days. Max Daily Amount: 10 mg    Panic disorder  + chronic and at goal.  Continue current treatment plan  Orders:  Alprazolam 0.5 mg  1-2 tablets as needed for panic attack.  Max of 2 per day and 20 pills per 30 days.   #20 and 2 refills.   Acne vulgaris   Chronic, at goal (stable), continue current treatment plan    Orders:    clindamycin-benzoyl peroxide (BENZACLIN) 1-5 % gel; APPLY EXTERNALLY TO THE AFFECTED AREA TWICE

## 2024-10-17 ENCOUNTER — TELEMEDICINE (OUTPATIENT)
Dept: PSYCHOLOGY | Age: 34
End: 2024-10-17
Payer: COMMERCIAL

## 2024-10-17 DIAGNOSIS — F41.1 GAD (GENERALIZED ANXIETY DISORDER): Primary | ICD-10-CM

## 2024-10-17 DIAGNOSIS — F90.0 ATTENTION DEFICIT HYPERACTIVITY DISORDER (ADHD), PREDOMINANTLY INATTENTIVE TYPE: ICD-10-CM

## 2024-10-17 PROCEDURE — 90832 PSYTX W PT 30 MINUTES: CPT | Performed by: PSYCHOLOGIST

## 2024-10-17 NOTE — PROGRESS NOTES
Partners, [facility] and [facility]    Interpersonal Safety     Do you feel physically or emotionally unsafe where you currently live?: No     Within the past 12 months, have you been hit, slapped, kicked or otherwise physically hurt by anyone? : No     Within the past 12 months, have you been hit, slapped, kicked or otherwise physically hurt by anyone? : No   Housing Stability: Not At Risk (6/28/2024)    Received from [facility], [facility] and [facility]    Housing/Utilities     Are you worried about losing your housing? : No     Within the past 12 months, have you ever stayed: outside, in a car, in a tent, in an overnight shelter, or temporarily in someone else's home (i.e. couch-surfing)?: No     Within the past 12 months, have you been unable to get utilities (heat, electricity) when it was really needed?: No     TOBACCO:   reports that she quit smoking about 10 years ago. Her smoking use included cigarettes. She started smoking about 10 years ago. She has a 0.1 pack-year smoking history. She has never used smokeless tobacco.  ETOH:   reports current alcohol use.      Diagnosis:  1. VERA (generalized anxiety disorder)    2. Attention deficit hyperactivity disorder (ADHD), predominantly inattentive type          Plan:  Pt interventions:  Trained in strategies for increasing balanced thinking and Identified maladaptive thoughts      Mc Baumann, was evaluated through a synchronous (real-time) audio-video encounter. The patient (or guardian if applicable) is aware that this is a billable service, which includes applicable co-pays. This Virtual Visit was conducted with patient's (and/or legal guardian's) consent. Patient identification was verified, and a caregiver was present when appropriate.   The patient was located at Home: [address] [address]  Provider was located at Facility (Appt Dept): [address] [place] [address]

## 2024-11-18 ENCOUNTER — TELEMEDICINE (OUTPATIENT)
Dept: PSYCHOLOGY | Age: 34
End: 2024-11-18
Payer: COMMERCIAL

## 2024-11-18 DIAGNOSIS — F41.1 GAD (GENERALIZED ANXIETY DISORDER): Primary | ICD-10-CM

## 2024-11-18 DIAGNOSIS — F41.0 PANIC DISORDER: ICD-10-CM

## 2024-11-18 PROCEDURE — 90832 PSYTX W PT 30 MINUTES: CPT | Performed by: PSYCHOLOGIST

## 2024-11-18 NOTE — PROGRESS NOTES
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 0.5 years (0.1 ttl pk-yrs)     Types: Cigarettes     Start date: 11/1/2013     Quit date: 12/28/2013     Years since quitting: 10.8    Smokeless tobacco: Never    Tobacco comments:     Denies smoking at this time   Substance and Sexual Activity    Alcohol use: Yes     Comment: h/o weekend binges 5 or more drinks when out 1-2x/week;  11/14:  cut most to all drinking out    Drug use: Yes     Types: Marijuana (Weed)    Sexual activity: Not Currently     Partners: Male     Birth control/protection: Condom, OCP   Other Topics Concern    Not on file   Social History Narrative    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     Social Determinants of Health     Financial Resource Strain: Low Risk  (5/15/2024)    Overall Financial Resource Strain (CARDIA)     Difficulty of Paying Living Expenses: Not hard at all   Food Insecurity: Not At Risk (6/28/2024)    [facility], [facility]    Food Insecurities     Within the past 12 months, did you worry that your food would run out before you got money to buy more?: No     Within the past 12 months, did the food you bought just not last and you didn't have money to get more?: No   Transportation Needs: Not At Risk (6/28/2024)    Received from [facility] and [facility], [facility] and [facility]    Transportation     Within the past 12 months, has a lack of transportation kept you from medical appointments or from doing things needed for daily living?: No   Physical Activity: Not on file   Stress: Not on file   Social Connections: Not on file   Intimate Partner Violence: Not At Risk (6/28/2024)    Received from [facility] and [facility], [facility]

## 2024-12-16 ENCOUNTER — PATIENT MESSAGE (OUTPATIENT)
Dept: INTERNAL MEDICINE CLINIC | Age: 34
End: 2024-12-16

## 2024-12-17 ENCOUNTER — OFFICE VISIT (OUTPATIENT)
Age: 34
End: 2024-12-17

## 2024-12-17 ENCOUNTER — OFFICE VISIT (OUTPATIENT)
Dept: INTERNAL MEDICINE CLINIC | Age: 34
End: 2024-12-17
Payer: COMMERCIAL

## 2024-12-17 VITALS
TEMPERATURE: 98.4 F | SYSTOLIC BLOOD PRESSURE: 116 MMHG | OXYGEN SATURATION: 97 % | DIASTOLIC BLOOD PRESSURE: 74 MMHG | RESPIRATION RATE: 16 BRPM | HEART RATE: 59 BPM

## 2024-12-17 VITALS
WEIGHT: 204.6 LBS | SYSTOLIC BLOOD PRESSURE: 118 MMHG | DIASTOLIC BLOOD PRESSURE: 70 MMHG | HEART RATE: 71 BPM | OXYGEN SATURATION: 99 % | BODY MASS INDEX: 31.01 KG/M2 | HEIGHT: 68 IN | TEMPERATURE: 98.3 F

## 2024-12-17 DIAGNOSIS — J02.9 SORE THROAT: ICD-10-CM

## 2024-12-17 DIAGNOSIS — J02.9 SORE THROAT: Primary | ICD-10-CM

## 2024-12-17 DIAGNOSIS — B30.9 ACUTE VIRAL CONJUNCTIVITIS OF BOTH EYES: ICD-10-CM

## 2024-12-17 DIAGNOSIS — N76.0 VAGINOSIS: Primary | ICD-10-CM

## 2024-12-17 LAB — S PYO AG THROAT QL: NORMAL

## 2024-12-17 PROCEDURE — 99213 OFFICE O/P EST LOW 20 MIN: CPT | Performed by: NURSE PRACTITIONER

## 2024-12-17 RX ORDER — FLUCONAZOLE 150 MG/1
150 TABLET ORAL
Qty: 2 TABLET | Refills: 0 | Status: SHIPPED | OUTPATIENT
Start: 2024-12-17 | End: 2024-12-19 | Stop reason: ALTCHOICE

## 2024-12-17 ASSESSMENT — ENCOUNTER SYMPTOMS
RHINORRHEA: 0
SINUS PRESSURE: 0
EYE DISCHARGE: 1
SHORTNESS OF BREATH: 0
COUGH: 1
SORE THROAT: 1
WHEEZING: 0
EYE ITCHING: 1
EYE PAIN: 0
EYE REDNESS: 1

## 2024-12-17 NOTE — PROGRESS NOTES
Normocephalic and atraumatic.      Right Ear: Tympanic membrane normal.      Left Ear: Tympanic membrane normal.      Nose: Nose normal.      Mouth/Throat:      Mouth: Mucous membranes are moist.      Pharynx: Oropharynx is clear. Uvula midline. No postnasal drip.   Eyes:      General: Lids are normal.      Conjunctiva/sclera:      Right eye: Right conjunctiva is injected.      Left eye: Left conjunctiva is injected.      Pupils: Pupils are equal, round, and reactive to light.   Cardiovascular:      Rate and Rhythm: Normal rate and regular rhythm.      Heart sounds: Normal heart sounds.   Pulmonary:      Effort: Pulmonary effort is normal. No respiratory distress.      Breath sounds: Normal breath sounds.   Lymphadenopathy:      Cervical: No cervical adenopathy.   Skin:     General: Skin is warm and dry.   Neurological:      Mental Status: She is alert and oriented to person, place, and time.   Psychiatric:         Mood and Affect: Mood normal.         Behavior: Behavior normal.       All questions answered. Patient stated no further questions or concerns at this time.     Electronically signed by RADHA Maya NP on 12/17/2024 at 4:59 PM.

## 2024-12-17 NOTE — ASSESSMENT & PLAN NOTE
Acute.  Symptoms x 1 day.  Warm or cool compresses several times a day, good handwashing, no contact lenses until symptoms have resolved.  Recommend Zyrtec and Flonase  Can use Patanol eyedrops over-the-counter.

## 2024-12-17 NOTE — ASSESSMENT & PLAN NOTE
Acute  Intermittent symptoms for 2 weeks.  Rapid strep was negative today at urgent care.  Will send a throat culture today.  She will be notified of the results.

## 2024-12-17 NOTE — PATIENT INSTRUCTIONS
Discharge medications reviewed with the patient and appropriate educational materials and side effects teaching were provided.    Advised the patient that we will send urine specimen for further testing. Results are typically available in 2-3 days.  Patient stated ok to leave voice message with results.     Follow up with PCP if do not improve. ER follow up required for symptoms including, but not limited to: abdominal pain, back/flank pain, nausea or vomiting, mental status change, fevers >101, dehydration, or rapid worsening symptoms Patient verbalized understanding.

## 2024-12-18 DIAGNOSIS — B37.31 CANDIDA VAGINITIS: ICD-10-CM

## 2024-12-18 DIAGNOSIS — N76.0 BV (BACTERIAL VAGINOSIS): Primary | ICD-10-CM

## 2024-12-18 DIAGNOSIS — B96.89 BV (BACTERIAL VAGINOSIS): Primary | ICD-10-CM

## 2024-12-18 LAB
BACTERIA UR CULT: NORMAL
BV BACTERIA DNA VAG QL NAA+PROBE: DETECTED
C GLABRATA DNA VAG QL NAA+PROBE: ABNORMAL
C GLABRATA DNA VAG QL NAA+PROBE: NOT DETECTED
C KRUSEI DNA VAG QL NAA+PROBE: DETECTED
C TRACH DNA UR QL NAA+PROBE: NEGATIVE
CANDIDA DNA VAG QL NAA+PROBE: DETECTED
N GONORRHOEA DNA UR QL NAA+PROBE: NEGATIVE
T VAGINALIS DNA VAG QL NAA+PROBE: NOT DETECTED

## 2024-12-18 RX ORDER — FLUCONAZOLE 150 MG/1
TABLET ORAL
Qty: 2 TABLET | Refills: 0 | Status: SHIPPED | OUTPATIENT
Start: 2024-12-18 | End: 2024-12-19 | Stop reason: ALTCHOICE

## 2024-12-18 RX ORDER — METRONIDAZOLE 500 MG/1
500 TABLET ORAL 2 TIMES DAILY
Qty: 14 TABLET | Refills: 0 | Status: SHIPPED | OUTPATIENT
Start: 2024-12-18 | End: 2024-12-25

## 2024-12-18 NOTE — RESULT ENCOUNTER NOTE
Inform patient that she has BV (Bacterial Vaginosis) and Candida (yeast) a Rx for Flagyl and fluconazole was sent to her pharmacy for treatment. Patient should avoid drinking alcohol completely while on this medication due to interaction with alcohol that could cause nausea and vomiting.      Thank you,    Dr. Misha Callahan

## 2024-12-19 ENCOUNTER — OFFICE VISIT (OUTPATIENT)
Dept: INTERNAL MEDICINE CLINIC | Age: 34
End: 2024-12-19
Payer: COMMERCIAL

## 2024-12-19 VITALS
OXYGEN SATURATION: 98 % | HEART RATE: 64 BPM | SYSTOLIC BLOOD PRESSURE: 110 MMHG | BODY MASS INDEX: 31.31 KG/M2 | HEIGHT: 68 IN | WEIGHT: 206.6 LBS | DIASTOLIC BLOOD PRESSURE: 68 MMHG

## 2024-12-19 DIAGNOSIS — Z79.899 ENCOUNTER FOR MONITORING STIMULANT THERAPY: ICD-10-CM

## 2024-12-19 DIAGNOSIS — J02.9 THROAT INFECTION: Primary | ICD-10-CM

## 2024-12-19 DIAGNOSIS — Z79.899 CHRONIC USE OF BENZODIAZEPINE FOR THERAPEUTIC PURPOSE: ICD-10-CM

## 2024-12-19 DIAGNOSIS — Z51.81 ENCOUNTER FOR MONITORING STIMULANT THERAPY: ICD-10-CM

## 2024-12-19 DIAGNOSIS — F41.0 PANIC DISORDER: ICD-10-CM

## 2024-12-19 DIAGNOSIS — B37.31 YEAST VAGINITIS: ICD-10-CM

## 2024-12-19 DIAGNOSIS — J06.9 PROTRACTED UPPER RESPIRATORY INFECTION: ICD-10-CM

## 2024-12-19 DIAGNOSIS — F90.0 ATTENTION DEFICIT HYPERACTIVITY DISORDER (ADHD), PREDOMINANTLY INATTENTIVE TYPE: ICD-10-CM

## 2024-12-19 PROBLEM — B30.9 ACUTE VIRAL CONJUNCTIVITIS OF BOTH EYES: Status: RESOLVED | Noted: 2024-12-17 | Resolved: 2024-12-19

## 2024-12-19 PROCEDURE — 99214 OFFICE O/P EST MOD 30 MIN: CPT | Performed by: FAMILY MEDICINE

## 2024-12-19 RX ORDER — FLUCONAZOLE 100 MG/1
100 TABLET ORAL
Qty: 7 TABLET | Refills: 0 | Status: SHIPPED | OUTPATIENT
Start: 2024-12-19 | End: 2025-01-02

## 2024-12-19 RX ORDER — DEXTROAMPHETAMINE SACCHARATE, AMPHETAMINE ASPARTATE, DEXTROAMPHETAMINE SULFATE AND AMPHETAMINE SULFATE 1.25; 1.25; 1.25; 1.25 MG/1; MG/1; MG/1; MG/1
5 TABLET ORAL 2 TIMES DAILY
Qty: 60 TABLET | Refills: 0 | Status: SHIPPED | OUTPATIENT
Start: 2024-12-19 | End: 2025-01-18

## 2024-12-19 RX ORDER — ALPRAZOLAM 0.5 MG
.5-1 TABLET ORAL DAILY PRN
Qty: 20 TABLET | Refills: 2 | Status: SHIPPED | OUTPATIENT
Start: 2025-01-06 | End: 2025-04-06

## 2024-12-19 NOTE — PATIENT INSTRUCTIONS
Metronidazole is for bacterial overgrowth.    Will treat the yeast infection with fluconazole.

## 2024-12-19 NOTE — PROGRESS NOTES
2024    Mc Baumann (:  1990) is a 33 y.o. female, here for evaluation of the following chief complaint(s):  Follow-up (Medication follow up, intermittent sore throat since )    ASSESSMENT/PLAN:    1. Throat infection  Patient admits to [facility].  Will make sure she does not have STD causing her sore throat.  - C.trachomatis N.gonorrhoeae DNA    2. Yeast vaginitis  Suggested prolonged treatment with fluconazole due to new prescription for 10 days of antibiotic.  She can hold on treatment of the BV.  She denies having discharge or odor.  She just has a severe itching from the yeast.  She can fill the metronidazole if she does not improve after treatment of the yeast.  - fluconazole (DIFLUCAN) 100 MG tablet; Take 1 tablet by mouth every 48 hours for 14 days  Dispense: 7 tablet; Refill: 0    3. Protracted upper respiratory infection  Start antibiotic treatment since she is day 19 into what sounds like started as a respiratory virus.  - amoxicillin-clavulanate (AUGMENTIN) 875-125 MG per tablet; Take 1 tablet by mouth 2 times daily for 10 days Take with a meal and full glass of liquid  Dispense: 20 tablet; Refill: 0    4. Attention deficit hyperactivity disorder (ADHD), predominantly inattentive type  Patient uses low doses of Adderall as needed.  60 pills normally lasts slightly over 30 days.  No signs of diversion or misuse.  - amphetamine-dextroamphetamine (ADDERALL, 5MG,) 5 MG tablet; Take 1 tablet by mouth 2 times daily for 30 days. Max Daily Amount: 10 mg  Dispense: 60 tablet; Refill: 0    5. Panic disorder  Still having approximately 10 panic attacks per month which require 2 doses to break.  Encouraged her to work on deep breathing and relaxation and work with therapy to see if she can reduce the frequency or severity.  She did not do well with many SSRIs.  - ALPRAZolam (XANAX) 0.5 MG tablet; Take 1-2 tablets by mouth daily as needed (panic attack) for up to 90 days. 20 pills to last

## 2024-12-20 LAB
BACTERIA THROAT AEROBE CULT: NORMAL
C TRACH DNA CVX QL NAA+PROBE: NEGATIVE
N GONORRHOEA DNA CERV MUCUS QL NAA+PROBE: NEGATIVE

## 2025-01-07 ENCOUNTER — TELEPHONE (OUTPATIENT)
Dept: INTERNAL MEDICINE CLINIC | Age: 35
End: 2025-01-07

## 2025-01-07 NOTE — TELEPHONE ENCOUNTER
Please complete PA for Aimovig.  Diagnosis-Migraine with aura and without status migrainosus, not intractable  [G43.109]

## 2025-01-08 NOTE — TELEPHONE ENCOUNTER
The medication is APPROVED THRU 01/07/2026    If this requires a response please respond to the pool ( P MHCX PSC MEDICATION PRE-AUTH).      Thank you please advise patient.

## 2025-01-08 NOTE — TELEPHONE ENCOUNTER
Submitted PA for AIMOVIG 140 MG/ML SOAJ   Via CMM (Key: [id number]) STATUS: PENDING.    Follow up done daily; if no decision with in three days we will refax.  If another three days goes by with no decision will call the insurance for status.

## 2025-01-29 RX ORDER — ERENUMAB-AOOE 140 MG/ML
INJECTION, SOLUTION SUBCUTANEOUS
Qty: 1 ML | Refills: 5 | Status: SHIPPED | OUTPATIENT
Start: 2025-01-29

## 2025-02-06 ENCOUNTER — OFFICE VISIT (OUTPATIENT)
Dept: INTERNAL MEDICINE CLINIC | Age: 35
End: 2025-02-06
Payer: COMMERCIAL

## 2025-02-06 VITALS
SYSTOLIC BLOOD PRESSURE: 110 MMHG | HEIGHT: 68 IN | WEIGHT: 202.6 LBS | DIASTOLIC BLOOD PRESSURE: 62 MMHG | HEART RATE: 73 BPM | OXYGEN SATURATION: 99 % | BODY MASS INDEX: 30.71 KG/M2

## 2025-02-06 DIAGNOSIS — R39.89 BLADDER PAIN: Primary | ICD-10-CM

## 2025-02-06 DIAGNOSIS — R10.31 RIGHT LOWER QUADRANT ABDOMINAL PAIN: ICD-10-CM

## 2025-02-06 PROCEDURE — 99214 OFFICE O/P EST MOD 30 MIN: CPT | Performed by: FAMILY MEDICINE

## 2025-02-06 SDOH — ECONOMIC STABILITY: FOOD INSECURITY: WITHIN THE PAST 12 MONTHS, THE FOOD YOU BOUGHT JUST DIDN'T LAST AND YOU DIDN'T HAVE MONEY TO GET MORE.: NEVER TRUE

## 2025-02-06 SDOH — ECONOMIC STABILITY: FOOD INSECURITY: WITHIN THE PAST 12 MONTHS, YOU WORRIED THAT YOUR FOOD WOULD RUN OUT BEFORE YOU GOT MONEY TO BUY MORE.: NEVER TRUE
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
## 2025-02-07 LAB
BILIRUB UR QL STRIP.AUTO: NEGATIVE
CLARITY UR: CLEAR
COLOR UR: YELLOW
GLUCOSE UR STRIP.AUTO-MCNC: NEGATIVE MG/DL
HGB UR QL STRIP.AUTO: NEGATIVE
KETONES UR STRIP.AUTO-MCNC: ABNORMAL MG/DL
LEUKOCYTE ESTERASE UR QL STRIP.AUTO: NEGATIVE
NITRITE UR QL STRIP.AUTO: NEGATIVE
PH UR STRIP.AUTO: 7.5 [PH] (ref 5–8)
PROT UR STRIP.AUTO-MCNC: NEGATIVE MG/DL
SP GR UR STRIP.AUTO: 1.02 (ref 1–1.03)
UA COMPLETE W REFLEX CULTURE PNL UR: ABNORMAL
UA DIPSTICK W REFLEX MICRO PNL UR: ABNORMAL
URN SPEC COLLECT METH UR: ABNORMAL
UROBILINOGEN UR STRIP-ACNC: 1 E.U./DL

## 2025-02-08 DIAGNOSIS — F90.0 ATTENTION DEFICIT HYPERACTIVITY DISORDER (ADHD), PREDOMINANTLY INATTENTIVE TYPE: ICD-10-CM

## 2025-02-09 RX ORDER — DEXTROAMPHETAMINE SACCHARATE, AMPHETAMINE ASPARTATE, DEXTROAMPHETAMINE SULFATE AND AMPHETAMINE SULFATE 1.25; 1.25; 1.25; 1.25 MG/1; MG/1; MG/1; MG/1
5 TABLET ORAL 2 TIMES DAILY
Qty: 60 TABLET | Refills: 0 | Status: SHIPPED | OUTPATIENT
Start: 2025-02-09 | End: 2025-03-11

## 2025-02-14 ENCOUNTER — PATIENT MESSAGE (OUTPATIENT)
Dept: INTERNAL MEDICINE CLINIC | Age: 35
End: 2025-02-14

## 2025-02-19 NOTE — TELEPHONE ENCOUNTER
Needs to be seen.  If nothing available, go to urgent care.  She needs to mask due to + for Flu A on 2/17/25

## 2025-02-26 RX ORDER — ONDANSETRON 4 MG/1
TABLET, ORALLY DISINTEGRATING ORAL
Qty: 12 TABLET | Refills: 1 | Status: SHIPPED | OUTPATIENT
Start: 2025-02-26

## 2025-02-26 NOTE — TELEPHONE ENCOUNTER
Medication:   Requested Prescriptions     Pending Prescriptions Disp Refills    ondansetron (ZOFRAN-ODT) 4 MG disintegrating tablet [Pharmacy Med Name: ONDANSETRON ODT 4 MG TABLET] 12 tablet 1     Sig: DISSOLVE ONE TABLET BY MOUTH EVERY 4 TO 6 HOURS AS NEEDED FOR NAUSEA        Last Filled:      Patient Phone Number: [phone] (home)     Last appt: 2/6/2025   Next appt: 3/20/2025    Last OARRS:       4/21/2023     9:45 AM   RX Monitoring   Periodic Controlled Substance Monitoring Assessed functional status.;No signs of potential drug abuse or diversion identified.

## 2025-03-13 ENCOUNTER — TELEMEDICINE (OUTPATIENT)
Dept: PSYCHOLOGY | Age: 35
End: 2025-03-13
Payer: COMMERCIAL

## 2025-03-13 DIAGNOSIS — F90.0 ATTENTION DEFICIT HYPERACTIVITY DISORDER (ADHD), PREDOMINANTLY INATTENTIVE TYPE: Primary | ICD-10-CM

## 2025-03-13 DIAGNOSIS — F41.1 GAD (GENERALIZED ANXIETY DISORDER): ICD-10-CM

## 2025-03-13 PROCEDURE — 90832 PSYTX W PT 30 MINUTES: CPT | Performed by: PSYCHOLOGIST

## 2025-03-13 NOTE — PROGRESS NOTES
99 patient has sufficient knowledge and skills in the use of technology enabling them to adequately benefit from telepsychology. It was determined that this patient was able to be properly treated without an in-person session. Patient verified that they were currently located at the [place] address that was provided during registration or another address, if noted below.    Total time spent for this encounter:  28m    --Gabi Zaragoza, PhD on 3/13/2025 at 10:36 AM    An electronic signature was used to authenticate this note.     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

## 2025-03-20 ENCOUNTER — TELEMEDICINE (OUTPATIENT)
Dept: INTERNAL MEDICINE CLINIC | Age: 35
End: 2025-03-20
Payer: COMMERCIAL

## 2025-03-20 DIAGNOSIS — L70.0 ACNE VULGARIS: ICD-10-CM

## 2025-03-20 DIAGNOSIS — F41.0 PANIC DISORDER: ICD-10-CM

## 2025-03-20 DIAGNOSIS — F90.0 ATTENTION DEFICIT HYPERACTIVITY DISORDER (ADHD), PREDOMINANTLY INATTENTIVE TYPE: ICD-10-CM

## 2025-03-20 DIAGNOSIS — F41.1 GAD (GENERALIZED ANXIETY DISORDER): Primary | ICD-10-CM

## 2025-03-20 DIAGNOSIS — Z79.899 CHRONIC USE OF BENZODIAZEPINE FOR THERAPEUTIC PURPOSE: ICD-10-CM

## 2025-03-20 PROCEDURE — 99214 OFFICE O/P EST MOD 30 MIN: CPT | Performed by: FAMILY MEDICINE

## 2025-03-20 RX ORDER — DEXTROAMPHETAMINE SACCHARATE, AMPHETAMINE ASPARTATE, DEXTROAMPHETAMINE SULFATE AND AMPHETAMINE SULFATE 1.25; 1.25; 1.25; 1.25 MG/1; MG/1; MG/1; MG/1
5 TABLET ORAL 2 TIMES DAILY
Qty: 60 TABLET | Refills: 0 | Status: SHIPPED | OUTPATIENT
Start: 2025-04-19 | End: 2025-05-19

## 2025-03-20 RX ORDER — CLINDAMYCIN AND BENZOYL PEROXIDE 10; 50 MG/G; MG/G
GEL TOPICAL
Qty: 35 G | Refills: 3 | Status: SHIPPED | OUTPATIENT
Start: 2025-03-20

## 2025-03-20 RX ORDER — MULTIVIT-MIN/IRON/FOLIC ACID/K 18-600-40
2000 CAPSULE ORAL DAILY
COMMUNITY

## 2025-03-20 RX ORDER — DEXTROAMPHETAMINE SACCHARATE, AMPHETAMINE ASPARTATE, DEXTROAMPHETAMINE SULFATE AND AMPHETAMINE SULFATE 1.25; 1.25; 1.25; 1.25 MG/1; MG/1; MG/1; MG/1
5 TABLET ORAL 2 TIMES DAILY
Qty: 60 TABLET | Refills: 0 | Status: SHIPPED | OUTPATIENT
Start: 2025-05-19 | End: 2025-06-18

## 2025-03-20 RX ORDER — DEXTROAMPHETAMINE SACCHARATE, AMPHETAMINE ASPARTATE, DEXTROAMPHETAMINE SULFATE AND AMPHETAMINE SULFATE 1.25; 1.25; 1.25; 1.25 MG/1; MG/1; MG/1; MG/1
5 TABLET ORAL 2 TIMES DAILY
Qty: 60 TABLET | Refills: 0 | Status: SHIPPED | OUTPATIENT
Start: 2025-03-20 | End: 2025-04-19

## 2025-03-20 NOTE — PROGRESS NOTES
twice daily as needed.  She does not always use it.  Per OARRS profile 60 pills were filled on December 19 and February 10.    Patient denies side effects with either Xanax or Adderall.      Review of Systems       Objective   Patient-Reported Vitals  No data recorded     Physical Exam  Constitutional:       Appearance: Normal appearance.   Pulmonary:      Effort: Pulmonary effort is normal. No respiratory distress.   Skin:     Coloration: Skin is not pale.   Neurological:      General: No focal deficit present.      Mental Status: She is alert and oriented to person, place, and time.   Psychiatric:         Mood and Affect: Affect normal. Mood is depressed. Mood is not anxious.         Behavior: Behavior normal.                  --Bailey Ruiz MD
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
## 2025-03-27 ENCOUNTER — TELEMEDICINE (OUTPATIENT)
Dept: PSYCHOLOGY | Age: 35
End: 2025-03-27
Payer: COMMERCIAL

## 2025-03-27 DIAGNOSIS — F33.1 MODERATE EPISODE OF RECURRENT MAJOR DEPRESSIVE DISORDER (HCC): ICD-10-CM

## 2025-03-27 DIAGNOSIS — F90.0 ATTENTION DEFICIT HYPERACTIVITY DISORDER (ADHD), PREDOMINANTLY INATTENTIVE TYPE: ICD-10-CM

## 2025-03-27 DIAGNOSIS — F41.1 GAD (GENERALIZED ANXIETY DISORDER): Primary | ICD-10-CM

## 2025-03-27 PROCEDURE — 90832 PSYTX W PT 30 MINUTES: CPT | Performed by: PSYCHOLOGIST
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
## 2025-04-17 DIAGNOSIS — G43.109 MIGRAINE WITH AURA AND WITHOUT STATUS MIGRAINOSUS, NOT INTRACTABLE: ICD-10-CM

## 2025-04-17 DIAGNOSIS — F41.0 PANIC DISORDER: ICD-10-CM

## 2025-04-17 DIAGNOSIS — K21.9 GASTROESOPHAGEAL REFLUX DISEASE WITHOUT ESOPHAGITIS: ICD-10-CM

## 2025-04-17 RX ORDER — SUMATRIPTAN SUCCINATE 100 MG/1
TABLET ORAL
Qty: 9 TABLET | Refills: 2 | Status: SHIPPED | OUTPATIENT
Start: 2025-04-17

## 2025-04-17 RX ORDER — FLUTICASONE PROPIONATE 50 MCG
SPRAY, SUSPENSION (ML) NASAL
Qty: 16 G | Refills: 5 | Status: SHIPPED | OUTPATIENT
Start: 2025-04-17

## 2025-04-17 RX ORDER — PROPRANOLOL HYDROCHLORIDE 10 MG/1
TABLET ORAL
Qty: 30 TABLET | Refills: 2 | Status: SHIPPED | OUTPATIENT
Start: 2025-04-17

## 2025-04-17 RX ORDER — ALPRAZOLAM 0.5 MG
.5-1 TABLET ORAL DAILY PRN
Qty: 20 TABLET | Refills: 1 | Status: SHIPPED | OUTPATIENT
Start: 2025-04-17 | End: 2025-06-16

## 2025-04-17 RX ORDER — METOCLOPRAMIDE 10 MG/1
TABLET ORAL
Qty: 90 TABLET | Refills: 0 | Status: SHIPPED | OUTPATIENT
Start: 2025-04-17

## 2025-04-22 ENCOUNTER — PATIENT MESSAGE (OUTPATIENT)
Dept: INTERNAL MEDICINE CLINIC | Age: 35
End: 2025-04-22

## 2025-04-24 ENCOUNTER — TELEMEDICINE (OUTPATIENT)
Dept: PSYCHOLOGY | Age: 35
End: 2025-04-24
Payer: COMMERCIAL

## 2025-04-24 DIAGNOSIS — F90.0 ATTENTION DEFICIT HYPERACTIVITY DISORDER (ADHD), PREDOMINANTLY INATTENTIVE TYPE: ICD-10-CM

## 2025-04-24 DIAGNOSIS — F41.1 GAD (GENERALIZED ANXIETY DISORDER): Primary | ICD-10-CM

## 2025-04-24 PROCEDURE — 90832 PSYTX W PT 30 MINUTES: CPT | Performed by: PSYCHOLOGIST

## 2025-04-24 NOTE — PROGRESS NOTES
noted below.    Total time spent for this encounter:  25m    --Gabi Zaragoza, PhD on 4/24/2025 at 10:16 AM    An electronic signature was used to authenticate this note.

## 2025-06-02 ENCOUNTER — TELEMEDICINE (OUTPATIENT)
Dept: PSYCHOLOGY | Age: 35
End: 2025-06-02
Payer: COMMERCIAL

## 2025-06-02 DIAGNOSIS — F90.0 ATTENTION DEFICIT HYPERACTIVITY DISORDER (ADHD), PREDOMINANTLY INATTENTIVE TYPE: Primary | ICD-10-CM

## 2025-06-02 DIAGNOSIS — F41.1 GAD (GENERALIZED ANXIETY DISORDER): ICD-10-CM

## 2025-06-02 PROCEDURE — 90832 PSYTX W PT 30 MINUTES: CPT | Performed by: PSYCHOLOGIST

## 2025-06-02 NOTE — PROGRESS NOTES
Current packs/day: 0.00     Average packs/day: 0.3 packs/day for 0.5 years (0.1 ttl pk-yrs)     Types: Cigarettes     Start date: 2013     Quit date: 2013     Years since quittin.4    Smokeless tobacco: Never    Tobacco comments:     Denies smoking at this time   Substance and Sexual Activity    Alcohol use: Yes     Comment: h/o weekend binges 5 or more drinks when out 1-2x/week;  :  cut most to all drinking out    Drug use: Yes     Types: Marijuana (Weed)    Sexual activity: Not Currently     Partners: Male     Birth control/protection: Condom, OCP   Other Topics Concern    Not on file   Social History Narrative    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     Social Drivers of Health     Financial Resource Strain: Low Risk  (5/15/2024)    Overall Financial Resource Strain (CARDIA)     Difficulty of Paying Living Expenses: Not hard at all   Food Insecurity: No Food Insecurity (2025)    Hunger Vital Sign     Worried About Running Out of Food in the Last Year: Never true     Ran Out of Food in the Last Year: Never true   Transportation Needs: No Transportation Needs (2025)    PRAPARE - Transportation     Lack of Transportation (Medical): No     Lack of Transportation (Non-Medical): No   Physical Activity: Not on file   Stress: Not on file   Social Connections: Not on file   Intimate Partner Violence: Not At Risk (2024)    [facility]    Interpersonal Safety     Do you feel physically or emotionally unsafe where you currently live?: No     Within the past 12 months, have you been hit, slapped, kicked or otherwise physically hurt by anyone? : No     Emotionally Harmed: Not on file   Housing Stability: Low Risk  (2025)    Housing Stability Vital Sign     Unable to Pay for Housing in the Last Year: No     Number of Times Moved in the Last Year: 0     Homeless in the Last Year: No     TOBACCO:   reports that she quit

## 2025-06-06 ENCOUNTER — OFFICE VISIT (OUTPATIENT)
Dept: INTERNAL MEDICINE CLINIC | Age: 35
End: 2025-06-06

## 2025-06-06 VITALS
HEIGHT: 68 IN | DIASTOLIC BLOOD PRESSURE: 70 MMHG | BODY MASS INDEX: 30.01 KG/M2 | OXYGEN SATURATION: 99 % | SYSTOLIC BLOOD PRESSURE: 110 MMHG | WEIGHT: 198 LBS | HEART RATE: 54 BPM

## 2025-06-06 DIAGNOSIS — M62.830 BACK MUSCLE SPASM: ICD-10-CM

## 2025-06-06 DIAGNOSIS — S30.0XXA CONTUSION OF LOWER BACK, INITIAL ENCOUNTER: Primary | ICD-10-CM

## 2025-06-06 PROCEDURE — 99213 OFFICE O/P EST LOW 20 MIN: CPT | Performed by: FAMILY MEDICINE

## 2025-06-06 RX ORDER — METHOCARBAMOL 500 MG/1
TABLET, FILM COATED ORAL
Qty: 90 TABLET | Refills: 1 | Status: SHIPPED | OUTPATIENT
Start: 2025-06-06 | End: 2025-06-20

## 2025-06-06 RX ORDER — NAPROXEN 500 MG/1
500 TABLET ORAL 2 TIMES DAILY WITH MEALS
Qty: 60 TABLET | Refills: 1 | Status: SHIPPED | OUTPATIENT
Start: 2025-06-06

## 2025-06-06 ASSESSMENT — PATIENT HEALTH QUESTIONNAIRE - PHQ9
SUM OF ALL RESPONSES TO PHQ QUESTIONS 1-9: 0
6. FEELING BAD ABOUT YOURSELF - OR THAT YOU ARE A FAILURE OR HAVE LET YOURSELF OR YOUR FAMILY DOWN: NOT AT ALL
5. POOR APPETITE OR OVEREATING: NOT AT ALL
SUM OF ALL RESPONSES TO PHQ QUESTIONS 1-9: 0
1. LITTLE INTEREST OR PLEASURE IN DOING THINGS: NOT AT ALL
4. FEELING TIRED OR HAVING LITTLE ENERGY: NOT AT ALL
9. THOUGHTS THAT YOU WOULD BE BETTER OFF DEAD, OR OF HURTING YOURSELF: NOT AT ALL
8. MOVING OR SPEAKING SO SLOWLY THAT OTHER PEOPLE COULD HAVE NOTICED. OR THE OPPOSITE, BEING SO FIGETY OR RESTLESS THAT YOU HAVE BEEN MOVING AROUND A LOT MORE THAN USUAL: NOT AT ALL
2. FEELING DOWN, DEPRESSED OR HOPELESS: NOT AT ALL
10. IF YOU CHECKED OFF ANY PROBLEMS, HOW DIFFICULT HAVE THESE PROBLEMS MADE IT FOR YOU TO DO YOUR WORK, TAKE CARE OF THINGS AT HOME, OR GET ALONG WITH OTHER PEOPLE: NOT DIFFICULT AT ALL
SUM OF ALL RESPONSES TO PHQ QUESTIONS 1-9: 0
3. TROUBLE FALLING OR STAYING ASLEEP: NOT AT ALL
7. TROUBLE CONCENTRATING ON THINGS, SUCH AS READING THE NEWSPAPER OR WATCHING TELEVISION: NOT AT ALL
SUM OF ALL RESPONSES TO PHQ QUESTIONS 1-9: 0

## 2025-06-06 NOTE — PROGRESS NOTES
Tenderness present. No deformity, spasms or bony tenderness. Decreased range of motion. Negative right straight leg raise test and negative left straight leg raise test. No scoliosis.      Comments: Pain with extension and left twisting.  Flexion and right twisting are reasonable.  Tender knot in the right gluteal muscles.  No tenderness over palpation of the thoracic or lumbar spine fine.  Not over left SI or left buttock.  TTP of right ischium and lateral to the right SI joint   Skin:     General: Skin is warm and dry.      Coloration: Skin is not pale.      Findings: No erythema or rash.   Neurological:      Mental Status: She is alert and oriented to person, place, and time.      Motor: No weakness or atrophy.      Gait: Gait normal.   Psychiatric:         Behavior: Behavior normal.

## 2025-06-13 ENCOUNTER — TELEMEDICINE (OUTPATIENT)
Dept: INTERNAL MEDICINE CLINIC | Age: 35
End: 2025-06-13

## 2025-06-13 DIAGNOSIS — S30.0XXD CONTUSION OF BUTTOCK, SUBSEQUENT ENCOUNTER: ICD-10-CM

## 2025-06-13 DIAGNOSIS — F41.0 PANIC DISORDER: ICD-10-CM

## 2025-06-13 DIAGNOSIS — Z51.81 ENCOUNTER FOR MONITORING STIMULANT THERAPY: ICD-10-CM

## 2025-06-13 DIAGNOSIS — Z79.899 CHRONIC USE OF BENZODIAZEPINE FOR THERAPEUTIC PURPOSE: Primary | ICD-10-CM

## 2025-06-13 DIAGNOSIS — Z79.899 ENCOUNTER FOR MONITORING STIMULANT THERAPY: ICD-10-CM

## 2025-06-13 RX ORDER — ALPRAZOLAM 0.5 MG
.5-1 TABLET ORAL DAILY PRN
Qty: 20 TABLET | Refills: 2 | Status: SHIPPED | OUTPATIENT
Start: 2025-06-30 | End: 2025-09-28

## 2025-06-13 NOTE — ASSESSMENT & PLAN NOTE
Chronic, at goal (stable), continue current treatment plan          Management per PCP.    Continue current therapy.

## 2025-06-13 NOTE — ASSESSMENT & PLAN NOTE
Chronic, at goal (stable), continue current treatment plan;  see AVS.  Encouraged daily progressive muscle relaxation exercises for at least 3 months.     Orders:    ALPRAZolam (XANAX) 0.5 MG tablet; Take 1-2 tablets by mouth daily as needed (panic attack) for up to 90 days. 20 pills to last at least 30 days;  all refills for 90 days. Max Daily Amount: 1 mg

## 2025-06-13 NOTE — PROGRESS NOTES
Mc Baumann, was evaluated through a synchronous (real-time) audio-video encounter. The patient (or guardian if applicable) is aware that this is a billable service, which includes applicable co-pays. This Virtual Visit was conducted with patient's (and/or legal guardian's) consent. Patient identification was verified, and a caregiver was present when appropriate.   The patient was located at Home: [address]  [address]  [facility] [address]  Provider was located at Facility (Appt Dept): [address] [place] [id number]  Confirm you are appropriately licensed, registered, or certified to deliver care in the state where the patient is located as indicated above. If you are not or unsure, please re-schedule the visit: Yes, I confirm.     Mc Baumann (:  1990) is a Established patient, presenting virtually for evaluation of the following:    Chief Complaint   Patient presents with    12 week med check           Below is the assessment and plan developed based on review of pertinent history, physical exam, labs, studies, and medications.     Assessment & Plan  Panic disorder   Chronic, at goal (stable), continue current treatment plan;  see AVS.  Encouraged daily progressive muscle relaxation exercises for at least 3 months.     Orders:    ALPRAZolam (XANAX) 0.5 MG tablet; Take 1-2 tablets by mouth daily as needed (panic attack) for up to 90 days. 20 pills to last at least 30 days;  all refills for 90 days. Max Daily Amount: 1 mg    Use of benzodiazepine for therapeutic purpose   [place] profile checked and is valid.  No signs of abuse, diversion or tolerance.         Encounter for monitoring stimulant therapy   Chronic, at goal (stable), continue current treatment plan           No follow-ups on file.       Subjective   HPI    90 day follow up for controlled substance(s)    OD risk:  320  Using medication for:  alprazolam for panic disorder;  Adderall for ADD  Functional improvement noted

## 2025-06-17 ENCOUNTER — PATIENT MESSAGE (OUTPATIENT)
Dept: INTERNAL MEDICINE CLINIC | Age: 35
End: 2025-06-17

## 2025-06-17 ENCOUNTER — HOSPITAL ENCOUNTER (OUTPATIENT)
Dept: GENERAL RADIOLOGY | Age: 35
Discharge: HOME | End: 2025-06-17

## 2025-06-17 DIAGNOSIS — S30.0XXD CONTUSION OF BUTTOCK, SUBSEQUENT ENCOUNTER: ICD-10-CM

## 2025-06-17 PROCEDURE — 72170 X-RAY EXAM OF PELVIS: CPT

## 2025-06-18 ENCOUNTER — RESULTS FOLLOW-UP (OUTPATIENT)
Dept: INTERNAL MEDICINE CLINIC | Age: 35
End: 2025-06-18

## 2025-07-03 ENCOUNTER — TELEMEDICINE (OUTPATIENT)
Dept: PSYCHOLOGY | Age: 35
End: 2025-07-03

## 2025-07-03 DIAGNOSIS — F90.0 ATTENTION DEFICIT HYPERACTIVITY DISORDER (ADHD), PREDOMINANTLY INATTENTIVE TYPE: ICD-10-CM

## 2025-07-03 DIAGNOSIS — F41.1 GAD (GENERALIZED ANXIETY DISORDER): Primary | ICD-10-CM

## 2025-07-03 PROCEDURE — 90832 PSYTX W PT 30 MINUTES: CPT | Performed by: PSYCHOLOGIST
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
## 2025-07-06 ENCOUNTER — PATIENT MESSAGE (OUTPATIENT)
Dept: INTERNAL MEDICINE CLINIC | Age: 35
End: 2025-07-06

## 2025-07-06 DIAGNOSIS — M79.18 RIGHT BUTTOCK PAIN: ICD-10-CM
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
## 2025-07-06 DIAGNOSIS — M54.50 LUMBAR PAIN: Primary | ICD-10-CM

## 2025-07-16 ENCOUNTER — OFFICE VISIT (OUTPATIENT)
Dept: INTERNAL MEDICINE CLINIC | Age: 35
End: 2025-07-16

## 2025-07-16 VITALS
BODY MASS INDEX: 29.77 KG/M2 | SYSTOLIC BLOOD PRESSURE: 108 MMHG | OXYGEN SATURATION: 98 % | HEIGHT: 68 IN | WEIGHT: 196.4 LBS | HEART RATE: 67 BPM | DIASTOLIC BLOOD PRESSURE: 76 MMHG

## 2025-07-16 DIAGNOSIS — M79.18 MUSCULOSKELETAL PAIN: ICD-10-CM

## 2025-07-16 DIAGNOSIS — M51.34 DISCOGENIC THORACIC PAIN: ICD-10-CM

## 2025-07-16 DIAGNOSIS — R10.9 RIGHT FLANK PAIN: Primary | ICD-10-CM

## 2025-07-16 LAB
BILIRUBIN, POC: NORMAL
BLOOD URINE, POC: NORMAL
CLARITY, POC: NORMAL
COLOR, POC: NORMAL
GLUCOSE URINE, POC: NORMAL MG/DL
KETONES, POC: NORMAL MG/DL
LEUKOCYTE EST, POC: NORMAL
NITRITE, POC: NORMAL
PH, POC: 5.5
PROTEIN, POC: NORMAL MG/DL
SPECIFIC GRAVITY, POC: 1.03
UROBILINOGEN, POC: 0.2 MG/DL

## 2025-07-16 PROCEDURE — 99213 OFFICE O/P EST LOW 20 MIN: CPT | Performed by: FAMILY MEDICINE

## 2025-07-16 PROCEDURE — 81002 URINALYSIS NONAUTO W/O SCOPE: CPT | Performed by: FAMILY MEDICINE

## 2025-07-16 RX ORDER — ERENUMAB-AOOE 140 MG/ML
INJECTION, SOLUTION SUBCUTANEOUS
Qty: 1 ML | Refills: 5 | Status: SHIPPED | OUTPATIENT
Start: 2025-07-16

## 2025-07-16 RX ORDER — NORTRIPTYLINE HYDROCHLORIDE 10 MG/1
10 CAPSULE ORAL NIGHTLY
Qty: 30 CAPSULE | Refills: 3 | Status: SHIPPED | OUTPATIENT
Start: 2025-07-16

## 2025-07-16 RX ORDER — GABAPENTIN 100 MG/1
100 CAPSULE ORAL 3 TIMES DAILY
Qty: 90 CAPSULE | Refills: 5 | Status: SHIPPED | OUTPATIENT
Start: 2025-07-16 | End: 2026-01-12

## 2025-07-16 NOTE — PROGRESS NOTES
2025    Mc Baumann (:  1990) is a 34 y.o. female, here for evaluation of the following chief complaint(s):  Discuss Imaging (Patient is here to discuss getting additional imagining. Has not had MRI done yet. Has paperwork that needs filled out for assistance with her AIMOVIG injection.)        ASSESSMENT/PLAN:    1. Right flank pain  Check urinalysis to make sure there is no blood or kidney injury.  Urine point-of-care testing today is okay.  No signs of blood or infection.  - POCT Urinalysis no Micro    2. Musculoskeletal pain  Start more aggressive treatment for pain.  Norepinephrine medication and EVE blocker.  We discussed that the doses may be an adequate to help much with the pain.  She can continue with Tylenol and muscle relaxer as needed.  - nortriptyline (PAMELOR) 10 MG capsule; Take 1 capsule by mouth nightly  Dispense: 30 capsule; Refill: 3  - gabapentin (NEURONTIN) 100 MG capsule; Take 1 capsule by mouth 3 times daily for 180 days. Intended supply: 90 days  Dispense: 90 capsule; Refill: 5    Formal physical therapy could be very helpful, not just home exercise programs provided by a friend who is a physical therapist.  She does not think she can afford to do this right now since she is self-pay.    3. Discogenic thoracic pain  Get the MRI of the lumbar and thoracic spine.  - MRI THORACIC SPINE WO CONTRAST; Future      4.  Migraine headaches.  She needs patient assistance paperwork filled out for Aimovig.  We did fill out paperwork and will send in for patient assistance.      FOLLOW UP:  Call or return to clinic prn if these symptoms worsen or fail to improve as anticipated.      HPI  continues to be in a lot of pain.  Hurts to breath.  She has pain that goes from the anterior right rib cage and wraps around to the back  The tops of her hips hurt.  She cannot lay on her back or lay on her stomach.  It hurts to sit more than 20 minutes.    Has to do a lot of stretches to even

## 2025-07-16 NOTE — PATIENT INSTRUCTIONS
Gabapentin:  Day 1-3:  Take 1 at bedtime.  Day 4-6:  Take 1 twice daily.  Day 7 on:  Take 1 three times per day.    If you are drowsy or dizzy, wean up more slowly = take the last dose that you used where the dizziness was tolerable.  If no symptoms, you can go up on dose quicker.

## 2025-07-28 ENCOUNTER — HOSPITAL ENCOUNTER (OUTPATIENT)
Dept: MRI IMAGING | Age: 35
Discharge: HOME | End: 2025-07-28
Attending: FAMILY MEDICINE

## 2025-07-28 DIAGNOSIS — M54.50 LUMBAR PAIN: ICD-10-CM

## 2025-07-28 DIAGNOSIS — M51.34 DISCOGENIC THORACIC PAIN: ICD-10-CM

## 2025-07-28 DIAGNOSIS — M79.18 RIGHT BUTTOCK PAIN: ICD-10-CM
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
## 2025-07-28 DIAGNOSIS — M48.00 CENTRAL STENOSIS OF SPINAL CANAL: ICD-10-CM

## 2025-07-28 DIAGNOSIS — S22.080S COMPRESSION FRACTURE OF T12 VERTEBRA, SEQUELA: Primary | ICD-10-CM

## 2025-07-28 PROCEDURE — 72148 MRI LUMBAR SPINE W/O DYE: CPT

## 2025-07-28 PROCEDURE — 72146 MRI CHEST SPINE W/O DYE: CPT

## 2025-07-28 NOTE — PROGRESS NOTES
Referral to [facility].  Marked as urgent.    I discussed with patient.   Try to make sure to follow up and see that they get her scheduled in a timely manner.

## 2025-07-29 ENCOUNTER — TELEPHONE (OUTPATIENT)
Dept: INTERNAL MEDICINE CLINIC | Age: 35
End: 2025-07-29

## 2025-07-29 DIAGNOSIS — M48.00 CENTRAL STENOSIS OF SPINAL CANAL: ICD-10-CM

## 2025-07-29 DIAGNOSIS — S22.080S COMPRESSION FRACTURE OF T12 VERTEBRA, SEQUELA: Primary | ICD-10-CM

## 2025-07-29 NOTE — TELEPHONE ENCOUNTER
Pt calling ha fractured vertebrae and you had given her referrl to [facility]---but pt has no insurance and they require cash up font--she can not do that---so they suggested [facility] said they have a financial plan---or is asking who you recommend---Pt Aware Dr perez today---Thanks.